# Patient Record
Sex: FEMALE | Race: WHITE | NOT HISPANIC OR LATINO | Employment: UNEMPLOYED | URBAN - METROPOLITAN AREA
[De-identification: names, ages, dates, MRNs, and addresses within clinical notes are randomized per-mention and may not be internally consistent; named-entity substitution may affect disease eponyms.]

---

## 2017-01-12 ENCOUNTER — HISTORICAL (OUTPATIENT)
Dept: LAB | Facility: HOSPITAL | Age: 62
End: 2017-01-12

## 2017-04-24 ENCOUNTER — HISTORICAL (OUTPATIENT)
Dept: LAB | Facility: HOSPITAL | Age: 62
End: 2017-04-24

## 2017-05-26 ENCOUNTER — HISTORICAL (OUTPATIENT)
Dept: LAB | Facility: HOSPITAL | Age: 62
End: 2017-05-26

## 2017-05-26 LAB
ABS NEUT (OLG): 4.5 X10(3)/MCL (ref 1.5–6.9)
BASOPHILS # BLD AUTO: 0.1 X10(3)/MCL (ref 0–0.1)
BASOPHILS NFR BLD AUTO: 1 % (ref 0–1)
EOSINOPHIL # BLD AUTO: 0.2 X10(3)/MCL (ref 0–0.6)
EOSINOPHIL NFR BLD AUTO: 3 % (ref 0–5)
ERYTHROCYTE [DISTWIDTH] IN BLOOD BY AUTOMATED COUNT: 15.7 % (ref 11.5–17)
HCT VFR BLD AUTO: 35.2 % (ref 36–48)
HGB BLD-MCNC: 10.7 GM/DL (ref 12–16)
LYMPHOCYTES # BLD AUTO: 2.1 X10(3)/MCL (ref 0.5–4.1)
LYMPHOCYTES NFR BLD AUTO: 28.3 % (ref 15–40)
MCH RBC QN AUTO: 27 PG (ref 27–34)
MCHC RBC AUTO-ENTMCNC: 30 GM/DL (ref 31–36)
MCV RBC AUTO: 89 FL (ref 80–99)
MONOCYTES # BLD AUTO: 0.5 X10(3)/MCL (ref 0–1.1)
MONOCYTES NFR BLD AUTO: 7 % (ref 4–12)
NEUTROPHILS # BLD AUTO: 4.5 X10(3)/MCL (ref 1.5–6.9)
NEUTROPHILS NFR BLD AUTO: 61 % (ref 43–75)
PLATELET # BLD AUTO: 223 X10(3)/MCL (ref 140–400)
PMV BLD AUTO: 12.1 FL (ref 6.8–10)
RBC # BLD AUTO: 3.95 X10(6)/MCL (ref 4.2–5.4)
WBC # SPEC AUTO: 7.4 X10(3)/MCL (ref 4.5–11.5)

## 2017-05-30 ENCOUNTER — HISTORICAL (OUTPATIENT)
Dept: LAB | Facility: HOSPITAL | Age: 62
End: 2017-05-30

## 2017-05-30 LAB
HAPTOGLOB SERPL-MCNC: 197 MG/DL (ref 31–200)
LDH SERPL-CCNC: 198 UNIT/L (ref 84–241)
RET# (OHS): 0 X10(6)/MCL
RETICULOCYTE COUNT AUTOMATED (OLG): 1.2 %

## 2017-06-12 ENCOUNTER — HISTORICAL (OUTPATIENT)
Dept: ADMINISTRATIVE | Facility: HOSPITAL | Age: 62
End: 2017-06-12

## 2017-08-04 ENCOUNTER — HISTORICAL (OUTPATIENT)
Dept: LAB | Facility: HOSPITAL | Age: 62
End: 2017-08-04

## 2017-08-04 LAB
EST. AVERAGE GLUCOSE BLD GHB EST-MCNC: 111 MG/DL
HBA1C MFR BLD: 5.5 % (ref 4.8–6)

## 2017-08-17 ENCOUNTER — HISTORICAL (OUTPATIENT)
Dept: ADMINISTRATIVE | Facility: HOSPITAL | Age: 62
End: 2017-08-17

## 2017-08-17 LAB
ABS NEUT (OLG): 5 X10(3)/MCL (ref 1.5–6.9)
ALBUMIN SERPL-MCNC: 3.5 GM/DL (ref 3.4–5)
ALBUMIN/GLOB SERPL: 0.8 RATIO
ALP SERPL-CCNC: 106 UNIT/L (ref 30–113)
ALT SERPL-CCNC: 17 UNIT/L (ref 10–45)
ANISOCYTOSIS BLD QL SMEAR: ABNORMAL
AST SERPL-CCNC: 16 UNIT/L (ref 15–37)
BASOPHILS # BLD AUTO: 0.1 X10(3)/MCL (ref 0–0.1)
BASOPHILS NFR BLD AUTO: 1 % (ref 0–1)
BASOPHILS NFR BLD MANUAL: 0 % (ref 0–1)
BILIRUB SERPL-MCNC: 0.6 MG/DL (ref 0.1–0.9)
BILIRUBIN DIRECT+TOT PNL SERPL-MCNC: 0.2 MG/DL (ref 0–0.3)
BILIRUBIN DIRECT+TOT PNL SERPL-MCNC: 0.4 MG/DL
BUN SERPL-MCNC: 46 MG/DL (ref 10–20)
CALCIUM SERPL-MCNC: 9.4 MG/DL (ref 8–10.5)
CHLORIDE SERPL-SCNC: 105 MMOL/L (ref 100–108)
CO2 SERPL-SCNC: 28 MMOL/L (ref 21–35)
CREAT SERPL-MCNC: 1.88 MG/DL (ref 0.7–1.3)
EOSINOPHIL # BLD AUTO: 0.1 X10(3)/MCL (ref 0–0.6)
EOSINOPHIL NFR BLD AUTO: 2 % (ref 0–5)
EOSINOPHIL NFR BLD MANUAL: 3 % (ref 0–5)
ERYTHROCYTE [DISTWIDTH] IN BLOOD BY AUTOMATED COUNT: 17.1 % (ref 11.5–17)
FERRITIN SERPL-MCNC: 65 NG/ML (ref 3–252)
FOLATE SERPL-MCNC: 17.6 NG/ML (ref 8.6–58.9)
GLOBULIN SER-MCNC: 4.2 GM/DL
GLUCOSE SERPL-MCNC: 88 MG/DL (ref 75–116)
GRANULOCYTES NFR BLD MANUAL: 70 % (ref 47–80)
HAPTOGLOB SERPL-MCNC: 231 MG/DL (ref 31–200)
HCT VFR BLD AUTO: 34.8 % (ref 36–48)
HGB BLD-MCNC: 10.9 GM/DL (ref 12–16)
IRON SATN MFR SERPL: 13 % (ref 15–55)
IRON SERPL-MCNC: 49 MCG/DL (ref 50–170)
LDH SERPL-CCNC: 162 UNIT/L (ref 112–240)
LYMPHOCYTES # BLD AUTO: 1.5 X10(3)/MCL (ref 0.5–4.1)
LYMPHOCYTES NFR BLD AUTO: 21.3 % (ref 15–40)
LYMPHOCYTES NFR BLD MANUAL: 21 % (ref 15–40)
MCH RBC QN AUTO: 27 PG (ref 27–34)
MCHC RBC AUTO-ENTMCNC: 31 GM/DL (ref 31–36)
MCV RBC AUTO: 87 FL (ref 80–99)
MONOCYTES # BLD AUTO: 0.4 X10(3)/MCL (ref 0–1.1)
MONOCYTES NFR BLD AUTO: 6 % (ref 4–12)
MONOCYTES NFR BLD MANUAL: 6 % (ref 4–12)
NEUTROPHILS # BLD AUTO: 5 X10(3)/MCL (ref 1.5–6.9)
NEUTROPHILS # BLD AUTO: 5 X10(3)/MCL (ref 1.5–6.9)
NEUTROPHILS NFR BLD AUTO: 69 % (ref 43–75)
PLATELET # BLD AUTO: 253 X10(3)/MCL (ref 140–400)
PLATELET # BLD EST: ADEQUATE 10*3/UL
PMV BLD AUTO: 11.6 FL (ref 6.8–10)
POTASSIUM SERPL-SCNC: 4.6 MMOL/L (ref 3.6–5.2)
PROT SERPL-MCNC: 7.2 GM/DL
PROT SERPL-MCNC: 7.7 GM/DL (ref 6.4–8.2)
RBC # BLD AUTO: 3.99 X10(6)/MCL (ref 4.2–5.4)
RBC MORPH BLD: ABNORMAL
RET# (OHS): 0.04 X10(6)/MCL
RETICULOCYTE COUNT AUTOMATED (OLG): 1.05 % (ref 0.5–1.5)
RHEUMATOID FACT SERPL-ACNC: <10 IU/ML (ref 0–15)
SODIUM SERPL-SCNC: 143 MMOL/L (ref 135–145)
TIBC SERPL-MCNC: 336 MCG/DL (ref 150–375)
TIBC SERPL-MCNC: 385 MCG/DL (ref 250–450)
TSH SERPL-ACNC: 3.02 MIU/ML (ref 0.36–3.74)
VIT B12 SERPL-MCNC: 417 PG/ML (ref 193–986)
WBC # SPEC AUTO: 7.2 X10(3)/MCL (ref 4.5–11.5)

## 2017-10-23 ENCOUNTER — HISTORICAL (OUTPATIENT)
Dept: LAB | Facility: HOSPITAL | Age: 62
End: 2017-10-23

## 2017-10-23 LAB
ABS NEUT (OLG): 3.1 X10(3)/MCL (ref 1.5–6.9)
BASOPHILS # BLD AUTO: 0.1 X10(3)/MCL (ref 0–0.1)
BASOPHILS NFR BLD AUTO: 1 % (ref 0–1)
EOSINOPHIL # BLD AUTO: 0.1 X10(3)/MCL (ref 0–0.6)
EOSINOPHIL NFR BLD AUTO: 2 % (ref 0–5)
ERYTHROCYTE [DISTWIDTH] IN BLOOD BY AUTOMATED COUNT: 15.9 % (ref 11.5–17)
FERRITIN SERPL-MCNC: 41 NG/ML (ref 3–252)
HCT VFR BLD AUTO: 33.5 % (ref 36–48)
HGB BLD-MCNC: 10.1 GM/DL (ref 12–16)
IRON SATN MFR SERPL: 12 % (ref 15–55)
IRON SERPL-MCNC: 41 MCG/DL (ref 50–170)
LYMPHOCYTES # BLD AUTO: 1 X10(3)/MCL (ref 0.5–4.1)
LYMPHOCYTES NFR BLD AUTO: 22 % (ref 15–40)
MCH RBC QN AUTO: 27 PG (ref 27–34)
MCHC RBC AUTO-ENTMCNC: 30 GM/DL (ref 31–36)
MCV RBC AUTO: 91 FL (ref 80–99)
MONOCYTES # BLD AUTO: 0.4 X10(3)/MCL (ref 0–1.1)
MONOCYTES NFR BLD AUTO: 10 % (ref 4–12)
NEUTROPHILS # BLD AUTO: 3.1 X10(3)/MCL (ref 1.5–6.9)
NEUTROPHILS NFR BLD AUTO: 65 % (ref 43–75)
PLATELET # BLD AUTO: 189 X10(3)/MCL (ref 140–400)
PMV BLD AUTO: 12.1 FL (ref 6.8–10)
RBC # BLD AUTO: 3.69 X10(6)/MCL (ref 4.2–5.4)
TIBC SERPL-MCNC: 294 MCG/DL (ref 150–375)
TIBC SERPL-MCNC: 335 MCG/DL (ref 250–450)
WBC # SPEC AUTO: 4.7 X10(3)/MCL (ref 4.5–11.5)

## 2017-12-27 ENCOUNTER — HISTORICAL (OUTPATIENT)
Dept: INFUSION THERAPY | Facility: HOSPITAL | Age: 62
End: 2017-12-27

## 2017-12-27 LAB
ABS NEUT (OLG): 4.1 X10(3)/MCL (ref 1.5–6.9)
BASOPHILS # BLD AUTO: 0.1 X10(3)/MCL (ref 0–0.1)
BASOPHILS NFR BLD AUTO: 1 % (ref 0–1)
EOSINOPHIL # BLD AUTO: 0.1 X10(3)/MCL (ref 0–0.6)
EOSINOPHIL NFR BLD AUTO: 2 % (ref 0–5)
ERYTHROCYTE [DISTWIDTH] IN BLOOD BY AUTOMATED COUNT: 15.5 % (ref 11.5–17)
FERRITIN SERPL-MCNC: 72 NG/ML (ref 3–252)
HCT VFR BLD AUTO: 34.9 % (ref 36–48)
HGB BLD-MCNC: 10.7 GM/DL (ref 12–16)
IRON SATN MFR SERPL: 21 % (ref 15–55)
IRON SERPL-MCNC: 65 MCG/DL (ref 50–170)
LYMPHOCYTES # BLD AUTO: 1.5 X10(3)/MCL (ref 0.5–4.1)
LYMPHOCYTES NFR BLD AUTO: 24.2 % (ref 15–40)
MCH RBC QN AUTO: 28 PG (ref 27–34)
MCHC RBC AUTO-ENTMCNC: 31 GM/DL (ref 31–36)
MCV RBC AUTO: 93 FL (ref 80–99)
MONOCYTES # BLD AUTO: 0.4 X10(3)/MCL (ref 0–1.1)
MONOCYTES NFR BLD AUTO: 6 % (ref 4–12)
NEUTROPHILS # BLD AUTO: 4.1 X10(3)/MCL (ref 1.5–6.9)
NEUTROPHILS NFR BLD AUTO: 66 % (ref 43–75)
PLATELET # BLD AUTO: 183 X10(3)/MCL (ref 140–400)
PMV BLD AUTO: 11.9 FL (ref 6.8–10)
RBC # BLD AUTO: 3.75 X10(6)/MCL (ref 4.2–5.4)
TIBC SERPL-MCNC: 246 MCG/DL (ref 150–375)
TIBC SERPL-MCNC: 311 MCG/DL (ref 250–450)
WBC # SPEC AUTO: 6.2 X10(3)/MCL (ref 4.5–11.5)

## 2018-06-27 ENCOUNTER — HISTORICAL (OUTPATIENT)
Dept: LAB | Facility: HOSPITAL | Age: 63
End: 2018-06-27

## 2018-06-27 LAB
ABS NEUT (OLG): 3.5 X10(3)/MCL (ref 1.5–6.9)
ALBUMIN SERPL-MCNC: 2.7 GM/DL (ref 3.4–5)
ALBUMIN/GLOB SERPL: 0.7 RATIO
ALP SERPL-CCNC: 122 UNIT/L (ref 30–113)
ALT SERPL-CCNC: 12 UNIT/L (ref 10–45)
AST SERPL-CCNC: 14 UNIT/L (ref 15–37)
BASOPHILS # BLD AUTO: 0 X10(3)/MCL (ref 0–0.1)
BASOPHILS NFR BLD AUTO: 1 % (ref 0–1)
BILIRUB SERPL-MCNC: 0.5 MG/DL (ref 0.1–0.9)
BILIRUBIN DIRECT+TOT PNL SERPL-MCNC: 0.1 MG/DL (ref 0–0.3)
BILIRUBIN DIRECT+TOT PNL SERPL-MCNC: 0.4 MG/DL
BUN SERPL-MCNC: 22 MG/DL (ref 10–20)
CALCIUM SERPL-MCNC: 8.6 MG/DL (ref 8–10.5)
CHLORIDE SERPL-SCNC: 106 MMOL/L (ref 100–108)
CO2 SERPL-SCNC: 29 MMOL/L (ref 21–35)
CREAT SERPL-MCNC: 1.07 MG/DL (ref 0.7–1.3)
EOSINOPHIL # BLD AUTO: 0.2 X10(3)/MCL (ref 0–0.6)
EOSINOPHIL NFR BLD AUTO: 3 % (ref 0–5)
ERYTHROCYTE [DISTWIDTH] IN BLOOD BY AUTOMATED COUNT: 16 % (ref 11.5–17)
FERRITIN SERPL-MCNC: 87 NG/ML (ref 3–252)
GLOBULIN SER-MCNC: 3.8 GM/DL
GLUCOSE SERPL-MCNC: 95 MG/DL (ref 75–116)
HCT VFR BLD AUTO: 34.7 % (ref 36–48)
HGB BLD-MCNC: 10.4 GM/DL (ref 12–16)
IRON SATN MFR SERPL: 15 % (ref 15–55)
IRON SERPL-MCNC: 37 MCG/DL (ref 50–170)
LYMPHOCYTES # BLD AUTO: 1.6 X10(3)/MCL (ref 0.5–4.1)
LYMPHOCYTES NFR BLD AUTO: 28.9 % (ref 15–40)
MCH RBC QN AUTO: 27 PG (ref 27–34)
MCHC RBC AUTO-ENTMCNC: 30 GM/DL (ref 31–36)
MCV RBC AUTO: 90 FL (ref 80–99)
MONOCYTES # BLD AUTO: 0.4 X10(3)/MCL (ref 0–1.1)
MONOCYTES NFR BLD AUTO: 6 % (ref 4–12)
NEUTROPHILS # BLD AUTO: 3.5 X10(3)/MCL (ref 1.5–6.9)
NEUTROPHILS NFR BLD AUTO: 61 % (ref 43–75)
PLATELET # BLD AUTO: 163 X10(3)/MCL (ref 140–400)
PMV BLD AUTO: 12.8 FL (ref 6.8–10)
POTASSIUM SERPL-SCNC: 3.6 MMOL/L (ref 3.6–5.2)
PROT SERPL-MCNC: 6.5 GM/DL (ref 6.4–8.2)
RBC # BLD AUTO: 3.87 X10(6)/MCL (ref 4.2–5.4)
SODIUM SERPL-SCNC: 142 MMOL/L (ref 135–145)
TIBC SERPL-MCNC: 209 MCG/DL (ref 150–375)
TIBC SERPL-MCNC: 246 MCG/DL (ref 250–450)
WBC # SPEC AUTO: 5.7 X10(3)/MCL (ref 4.5–11.5)

## 2018-07-13 ENCOUNTER — HISTORICAL (OUTPATIENT)
Dept: LAB | Facility: HOSPITAL | Age: 63
End: 2018-07-13

## 2018-07-13 LAB
ABS NEUT (OLG): 4.1 X10(3)/MCL (ref 1.5–6.9)
ALBUMIN SERPL-MCNC: 3.1 GM/DL (ref 3.4–5)
ALBUMIN/GLOB SERPL: 0.9 RATIO
ALP SERPL-CCNC: 140 UNIT/L (ref 30–113)
ALT SERPL-CCNC: 13 UNIT/L (ref 10–45)
AST SERPL-CCNC: 15 UNIT/L (ref 15–37)
BASOPHILS # BLD AUTO: 0.1 X10(3)/MCL (ref 0–0.1)
BASOPHILS NFR BLD AUTO: 1 % (ref 0–1)
BILIRUB SERPL-MCNC: 0.6 MG/DL (ref 0.1–0.9)
BILIRUBIN DIRECT+TOT PNL SERPL-MCNC: 0.2 MG/DL (ref 0–0.3)
BILIRUBIN DIRECT+TOT PNL SERPL-MCNC: 0.4 MG/DL
BUN SERPL-MCNC: 22 MG/DL (ref 10–20)
CALCIUM SERPL-MCNC: 9.1 MG/DL (ref 8–10.5)
CHLORIDE SERPL-SCNC: 106 MMOL/L (ref 100–108)
CHOLEST SERPL-MCNC: 198 MG/DL (ref 140–200)
CHOLEST/HDLC SERPL: 5 MG/DL (ref 0–8)
CO2 SERPL-SCNC: 29 MMOL/L (ref 21–35)
CREAT SERPL-MCNC: 1.1 MG/DL (ref 0.7–1.3)
EOSINOPHIL # BLD AUTO: 0.1 X10(3)/MCL (ref 0–0.6)
EOSINOPHIL NFR BLD AUTO: 2 % (ref 0–5)
ERYTHROCYTE [DISTWIDTH] IN BLOOD BY AUTOMATED COUNT: 16.2 % (ref 11.5–17)
EST. AVERAGE GLUCOSE BLD GHB EST-MCNC: 123 MG/DL
GLOBULIN SER-MCNC: 3.6 GM/DL
GLUCOSE SERPL-MCNC: 97 MG/DL (ref 75–116)
HBA1C MFR BLD: 5.9 % (ref 4.8–6)
HCT VFR BLD AUTO: 35.7 % (ref 36–48)
HDLC SERPL-MCNC: 40 MG/DL (ref 35–59)
HGB BLD-MCNC: 10.7 GM/DL (ref 12–16)
LDLC SERPL CALC-MCNC: 132 MG/DL (ref 100–129)
LYMPHOCYTES # BLD AUTO: 1.4 X10(3)/MCL (ref 0.5–4.1)
LYMPHOCYTES NFR BLD AUTO: 22.4 % (ref 15–40)
MCH RBC QN AUTO: 27 PG (ref 27–34)
MCHC RBC AUTO-ENTMCNC: 30 GM/DL (ref 31–36)
MCV RBC AUTO: 91 FL (ref 80–99)
MONOCYTES # BLD AUTO: 0.4 X10(3)/MCL (ref 0–1.1)
MONOCYTES NFR BLD AUTO: 7 % (ref 4–12)
NEUTROPHILS # BLD AUTO: 4.1 X10(3)/MCL (ref 1.5–6.9)
NEUTROPHILS NFR BLD AUTO: 67 % (ref 43–75)
PLATELET # BLD AUTO: 188 X10(3)/MCL (ref 140–400)
PMV BLD AUTO: 12.9 FL (ref 6.8–10)
POTASSIUM SERPL-SCNC: 4.3 MMOL/L (ref 3.6–5.2)
PROT SERPL-MCNC: 6.7 GM/DL (ref 6.4–8.2)
RBC # BLD AUTO: 3.94 X10(6)/MCL (ref 4.2–5.4)
SODIUM SERPL-SCNC: 145 MMOL/L (ref 135–145)
TRIGL SERPL-MCNC: 181 MG/DL (ref 35–150)
URATE SERPL-MCNC: 6.6 MG/DL (ref 2.6–7.2)
VLDLC SERPL CALC-MCNC: 36 MG/DL
WBC # SPEC AUTO: 6.1 X10(3)/MCL (ref 4.5–11.5)

## 2018-10-15 ENCOUNTER — HISTORICAL (OUTPATIENT)
Dept: LAB | Facility: HOSPITAL | Age: 63
End: 2018-10-15

## 2018-10-15 LAB
ABS NEUT (OLG): 4.6 X10(3)/MCL (ref 1.5–6.9)
ALBUMIN SERPL-MCNC: 3.1 GM/DL (ref 3.4–5)
ALBUMIN/GLOB SERPL: 0.8 RATIO
ALP SERPL-CCNC: 128 UNIT/L (ref 30–113)
ALT SERPL-CCNC: 16 UNIT/L (ref 10–45)
AST SERPL-CCNC: 15 UNIT/L (ref 15–37)
BASOPHILS # BLD AUTO: 0.1 X10(3)/MCL (ref 0–0.1)
BASOPHILS NFR BLD AUTO: 1 % (ref 0–1)
BILIRUB SERPL-MCNC: 0.4 MG/DL (ref 0.1–0.9)
BILIRUBIN DIRECT+TOT PNL SERPL-MCNC: 0.1 MG/DL (ref 0–0.3)
BILIRUBIN DIRECT+TOT PNL SERPL-MCNC: 0.3 MG/DL
BUN SERPL-MCNC: 34 MG/DL (ref 10–20)
CALCIUM SERPL-MCNC: 8.9 MG/DL (ref 8–10.5)
CHLORIDE SERPL-SCNC: 105 MMOL/L (ref 100–108)
CO2 SERPL-SCNC: 30 MMOL/L (ref 21–35)
CREAT SERPL-MCNC: 1.22 MG/DL (ref 0.7–1.3)
EOSINOPHIL # BLD AUTO: 0.2 X10(3)/MCL (ref 0–0.6)
EOSINOPHIL NFR BLD AUTO: 3 % (ref 0–5)
ERYTHROCYTE [DISTWIDTH] IN BLOOD BY AUTOMATED COUNT: 15.9 % (ref 11.5–17)
FERRITIN SERPL-MCNC: 99 NG/ML (ref 3–252)
GLOBULIN SER-MCNC: 4 GM/DL
GLUCOSE SERPL-MCNC: 127 MG/DL (ref 75–116)
HCT VFR BLD AUTO: 31.9 % (ref 36–48)
HGB BLD-MCNC: 9.6 GM/DL (ref 12–16)
IMM GRANULOCYTES # BLD AUTO: 0.02 10*3/UL (ref 0–0.02)
IMM GRANULOCYTES NFR BLD AUTO: 0.3 % (ref 0–0.43)
IRON SATN MFR SERPL: 12 % (ref 15–55)
IRON SERPL-MCNC: 36 MCG/DL (ref 50–170)
LYMPHOCYTES # BLD AUTO: 1.2 X10(3)/MCL (ref 0.5–4.1)
LYMPHOCYTES NFR BLD AUTO: 19 % (ref 15–40)
MCH RBC QN AUTO: 28 PG (ref 27–34)
MCHC RBC AUTO-ENTMCNC: 30 GM/DL (ref 31–36)
MCV RBC AUTO: 94 FL (ref 80–99)
MONOCYTES # BLD AUTO: 0.3 X10(3)/MCL (ref 0–1.1)
MONOCYTES NFR BLD AUTO: 5 % (ref 4–12)
NEUTROPHILS # BLD AUTO: 4.6 X10(3)/MCL (ref 1.5–6.9)
NEUTROPHILS NFR BLD AUTO: 71 % (ref 43–75)
PLATELET # BLD AUTO: 187 X10(3)/MCL (ref 140–400)
PMV BLD AUTO: 12.3 FL (ref 6.8–10)
POTASSIUM SERPL-SCNC: 5 MMOL/L (ref 3.6–5.2)
PROT SERPL-MCNC: 7.1 GM/DL (ref 6.4–8.2)
RBC # BLD AUTO: 3.38 X10(6)/MCL (ref 4.2–5.4)
RET# (OHS): 0.06 X10(6)/MCL (ref 0.02–0.08)
RETICULOCYTE COUNT AUTOMATED (OLG): 1.78 % (ref 0.5–1.5)
SODIUM SERPL-SCNC: 142 MMOL/L (ref 135–145)
TIBC SERPL-MCNC: 264 MCG/DL (ref 150–375)
TIBC SERPL-MCNC: 300 MCG/DL (ref 250–450)
WBC # SPEC AUTO: 6.4 X10(3)/MCL (ref 4.5–11.5)

## 2018-11-05 ENCOUNTER — HISTORICAL (OUTPATIENT)
Dept: LAB | Facility: HOSPITAL | Age: 63
End: 2018-11-05

## 2018-11-05 LAB
ABS NEUT (OLG): 5.7 X10(3)/MCL (ref 1.5–6.9)
ALBUMIN SERPL-MCNC: 3.3 GM/DL (ref 3.4–5)
ALBUMIN/GLOB SERPL: 0.8 RATIO
ALP SERPL-CCNC: 133 UNIT/L (ref 30–113)
ALT SERPL-CCNC: 13 UNIT/L (ref 10–45)
AST SERPL-CCNC: 12 UNIT/L (ref 15–37)
BASOPHILS # BLD AUTO: 0.1 X10(3)/MCL (ref 0–0.1)
BASOPHILS NFR BLD AUTO: 1 % (ref 0–1)
BILIRUB SERPL-MCNC: 0.3 MG/DL (ref 0.1–0.9)
BILIRUBIN DIRECT+TOT PNL SERPL-MCNC: 0.1 MG/DL (ref 0–0.3)
BILIRUBIN DIRECT+TOT PNL SERPL-MCNC: 0.2 MG/DL
BUN SERPL-MCNC: 39 MG/DL (ref 10–20)
CALCIUM SERPL-MCNC: 9.3 MG/DL (ref 8–10.5)
CHLORIDE SERPL-SCNC: 103 MMOL/L (ref 100–108)
CO2 SERPL-SCNC: 28 MMOL/L (ref 21–35)
CREAT SERPL-MCNC: 1.44 MG/DL (ref 0.7–1.3)
EOSINOPHIL # BLD AUTO: 0.4 X10(3)/MCL (ref 0–0.6)
EOSINOPHIL NFR BLD AUTO: 5 % (ref 0–5)
ERYTHROCYTE [DISTWIDTH] IN BLOOD BY AUTOMATED COUNT: 14.9 % (ref 11.5–17)
EST. AVERAGE GLUCOSE BLD GHB EST-MCNC: 140 MG/DL
GLOBULIN SER-MCNC: 4 GM/DL
GLUCOSE SERPL-MCNC: 112 MG/DL (ref 75–116)
HBA1C MFR BLD: 6.5 % (ref 4.8–6)
HCT VFR BLD AUTO: 32.5 % (ref 36–48)
HGB BLD-MCNC: 9.6 GM/DL (ref 12–16)
IMM GRANULOCYTES # BLD AUTO: 0.03 10*3/UL (ref 0–0.02)
IMM GRANULOCYTES NFR BLD AUTO: 0.4 % (ref 0–0.43)
LYMPHOCYTES # BLD AUTO: 1.6 X10(3)/MCL (ref 0.5–4.1)
LYMPHOCYTES NFR BLD AUTO: 20 % (ref 15–40)
MCH RBC QN AUTO: 27 PG (ref 27–34)
MCHC RBC AUTO-ENTMCNC: 30 GM/DL (ref 31–36)
MCV RBC AUTO: 93 FL (ref 80–99)
MONOCYTES # BLD AUTO: 0.5 X10(3)/MCL (ref 0–1.1)
MONOCYTES NFR BLD AUTO: 6 % (ref 4–12)
NEUTROPHILS # BLD AUTO: 5.7 X10(3)/MCL (ref 1.5–6.9)
NEUTROPHILS NFR BLD AUTO: 68 % (ref 43–75)
PLATELET # BLD AUTO: 232 X10(3)/MCL (ref 140–400)
PMV BLD AUTO: 11.2 FL (ref 6.8–10)
POTASSIUM SERPL-SCNC: 5.5 MMOL/L (ref 3.6–5.2)
PROT SERPL-MCNC: 7.3 GM/DL (ref 6.4–8.2)
RBC # BLD AUTO: 3.5 X10(6)/MCL (ref 4.2–5.4)
SODIUM SERPL-SCNC: 140 MMOL/L (ref 135–145)
T4 FREE SERPL-MCNC: 1.01 NG/DL (ref 0.76–1.46)
TSH SERPL-ACNC: 3.83 MIU/ML (ref 0.36–3.74)
WBC # SPEC AUTO: 8.4 X10(3)/MCL (ref 4.5–11.5)

## 2018-11-21 ENCOUNTER — HISTORICAL (OUTPATIENT)
Dept: LAB | Facility: HOSPITAL | Age: 63
End: 2018-11-21

## 2018-11-21 LAB
ABS NEUT (OLG): 3.8 X10(3)/MCL (ref 1.5–6.9)
BASOPHILS # BLD AUTO: 0.1 X10(3)/MCL (ref 0–0.1)
BASOPHILS NFR BLD AUTO: 1 % (ref 0–1)
EOSINOPHIL # BLD AUTO: 0.2 X10(3)/MCL (ref 0–0.6)
EOSINOPHIL NFR BLD AUTO: 3 % (ref 0–5)
ERYTHROCYTE [DISTWIDTH] IN BLOOD BY AUTOMATED COUNT: 15.3 % (ref 11.5–17)
FERRITIN SERPL-MCNC: 97 NG/ML (ref 3–252)
FOLATE SERPL-MCNC: >20 NG/ML (ref 8.6–58.9)
HCT VFR BLD AUTO: 31.4 % (ref 36–48)
HGB BLD-MCNC: 9.2 GM/DL (ref 12–16)
IMM GRANULOCYTES # BLD AUTO: 0.01 10*3/UL (ref 0–0.02)
IMM GRANULOCYTES NFR BLD AUTO: 0.2 % (ref 0–0.43)
IRON SATN MFR SERPL: 9 % (ref 15–55)
IRON SERPL-MCNC: 28 MCG/DL (ref 50–170)
LYMPHOCYTES # BLD AUTO: 1.3 X10(3)/MCL (ref 0.5–4.1)
LYMPHOCYTES NFR BLD AUTO: 23 % (ref 15–40)
MCH RBC QN AUTO: 28 PG (ref 27–34)
MCHC RBC AUTO-ENTMCNC: 29 GM/DL (ref 31–36)
MCV RBC AUTO: 94 FL (ref 80–99)
MONOCYTES # BLD AUTO: 0.4 X10(3)/MCL (ref 0–1.1)
MONOCYTES NFR BLD AUTO: 7 % (ref 4–12)
NEUTROPHILS # BLD AUTO: 3.8 X10(3)/MCL (ref 1.5–6.9)
NEUTROPHILS NFR BLD AUTO: 65 % (ref 43–75)
PLATELET # BLD AUTO: 199 X10(3)/MCL (ref 140–400)
PMV BLD AUTO: 12.4 FL (ref 6.8–10)
RBC # BLD AUTO: 3.34 X10(6)/MCL (ref 4.2–5.4)
RET# (OHS): 0.05 X10(6)/MCL (ref 0.02–0.08)
RETICULOCYTE COUNT AUTOMATED (OLG): 1.55 % (ref 0.5–1.5)
TIBC SERPL-MCNC: 268 MCG/DL (ref 150–375)
TIBC SERPL-MCNC: 296 MCG/DL (ref 250–450)
TRANSFERRIN SERPL-MCNC: 228 MG/DL (ref 200–360)
VIT B12 SERPL-MCNC: 516 PG/ML (ref 193–986)
WBC # SPEC AUTO: 5.8 X10(3)/MCL (ref 4.5–11.5)

## 2019-03-13 ENCOUNTER — HISTORICAL (OUTPATIENT)
Dept: LAB | Facility: HOSPITAL | Age: 64
End: 2019-03-13

## 2019-03-13 LAB
ABS NEUT (OLG): 3.8 X10(3)/MCL (ref 1.5–6.9)
ALBUMIN SERPL-MCNC: 3.2 GM/DL (ref 3.4–5)
ALBUMIN/GLOB SERPL: 0.8 RATIO
ALP SERPL-CCNC: 116 UNIT/L (ref 30–113)
ALT SERPL-CCNC: 17 UNIT/L (ref 10–45)
ANISOCYTOSIS BLD QL SMEAR: ABNORMAL
AST SERPL-CCNC: 14 UNIT/L (ref 15–37)
BASOPHILS # BLD AUTO: 0.1 X10(3)/MCL (ref 0–0.1)
BASOPHILS NFR BLD AUTO: 1 % (ref 0–1)
BILIRUB SERPL-MCNC: 0.4 MG/DL (ref 0.1–0.9)
BILIRUBIN DIRECT+TOT PNL SERPL-MCNC: <0.05 MG/DL (ref 0–0.3)
BILIRUBIN DIRECT+TOT PNL SERPL-MCNC: >0.35 MG/DL
BUN SERPL-MCNC: 54 MG/DL (ref 10–20)
CALCIUM SERPL-MCNC: 9.2 MG/DL (ref 8–10.5)
CHLORIDE SERPL-SCNC: 104 MMOL/L (ref 100–108)
CO2 SERPL-SCNC: 25 MMOL/L (ref 21–35)
CREAT SERPL-MCNC: 1.58 MG/DL (ref 0.7–1.3)
CREAT UR-MCNC: 76.9 MG/DL
EOSINOPHIL # BLD AUTO: 0.2 X10(3)/MCL (ref 0–0.6)
EOSINOPHIL NFR BLD AUTO: 2 % (ref 0–5)
ERYTHROCYTE [DISTWIDTH] IN BLOOD BY AUTOMATED COUNT: 15.1 % (ref 11.5–17)
EST. AVERAGE GLUCOSE BLD GHB EST-MCNC: 131 MG/DL
GLOBULIN SER-MCNC: 4 GM/DL
GLUCOSE SERPL-MCNC: 98 MG/DL (ref 75–116)
HBA1C MFR BLD: 6.2 % (ref 4.8–6)
HCT VFR BLD AUTO: 31.5 % (ref 36–48)
HGB BLD-MCNC: 9.3 GM/DL (ref 12–16)
HYPOCHROMIA BLD QL SMEAR: ABNORMAL
IMM GRANULOCYTES # BLD AUTO: 0.02 10*3/UL (ref 0–0.02)
IMM GRANULOCYTES NFR BLD AUTO: 0.3 % (ref 0–0.43)
LYMPHOCYTES # BLD AUTO: 1.8 X10(3)/MCL (ref 0.5–4.1)
LYMPHOCYTES NFR BLD AUTO: 28 % (ref 15–40)
MCH RBC QN AUTO: 28 PG (ref 27–34)
MCHC RBC AUTO-ENTMCNC: 30 GM/DL (ref 31–36)
MCV RBC AUTO: 94 FL (ref 80–99)
MICROALBUMIN UR-MCNC: 4.4 MG/DL (ref 0.1–2)
MICROALBUMIN/CREAT RATIO PNL UR: 57.2 MG/GM CR (ref 0–30)
MICROCYTES BLD QL SMEAR: ABNORMAL
MONOCYTES # BLD AUTO: 0.4 X10(3)/MCL (ref 0–1.1)
MONOCYTES NFR BLD AUTO: 7 % (ref 4–12)
NEUTROPHILS # BLD AUTO: 3.8 X10(3)/MCL (ref 1.5–6.9)
NEUTROPHILS NFR BLD AUTO: 61 % (ref 43–75)
PLATELET # BLD AUTO: 225 X10(3)/MCL (ref 140–400)
PLATELET # BLD EST: ADEQUATE 10*3/UL
PMV BLD AUTO: 11.6 FL (ref 6.8–10)
POTASSIUM SERPL-SCNC: 4.9 MMOL/L (ref 3.6–5.2)
PROT SERPL-MCNC: 7.2 GM/DL (ref 6.4–8.2)
RBC # BLD AUTO: 3.35 X10(6)/MCL (ref 4.2–5.4)
RBC MORPH BLD: ABNORMAL
SODIUM SERPL-SCNC: 140 MMOL/L (ref 135–145)
WBC # SPEC AUTO: 6.2 X10(3)/MCL (ref 4.5–11.5)

## 2019-04-24 ENCOUNTER — HISTORICAL (OUTPATIENT)
Dept: LAB | Facility: HOSPITAL | Age: 64
End: 2019-04-24

## 2019-04-24 LAB
ABS NEUT (OLG): 5.4 X10(3)/MCL (ref 1.5–6.9)
ALBUMIN SERPL-MCNC: 3.1 GM/DL (ref 3.4–5)
ALBUMIN/GLOB SERPL: 0.8 RATIO
ALP SERPL-CCNC: 107 UNIT/L (ref 30–113)
ALT SERPL-CCNC: 16 UNIT/L (ref 10–45)
AST SERPL-CCNC: 17 UNIT/L (ref 15–37)
BASOPHILS # BLD AUTO: 0.1 X10(3)/MCL (ref 0–0.1)
BASOPHILS NFR BLD AUTO: 1 % (ref 0–1)
BILIRUB SERPL-MCNC: 0.4 MG/DL (ref 0.1–0.9)
BILIRUBIN DIRECT+TOT PNL SERPL-MCNC: 0.1 MG/DL (ref 0–0.3)
BILIRUBIN DIRECT+TOT PNL SERPL-MCNC: 0.3 MG/DL
BUN SERPL-MCNC: 58 MG/DL (ref 10–20)
CALCIUM SERPL-MCNC: 9.4 MG/DL (ref 8–10.5)
CHLORIDE SERPL-SCNC: 102 MMOL/L (ref 100–108)
CHOLEST SERPL-MCNC: 173 MG/DL (ref 140–200)
CHOLEST/HDLC SERPL: 5 MG/DL (ref 0–8)
CO2 SERPL-SCNC: 28 MMOL/L (ref 21–35)
CREAT SERPL-MCNC: 1.83 MG/DL (ref 0.7–1.3)
EOSINOPHIL # BLD AUTO: 0.2 X10(3)/MCL (ref 0–0.6)
EOSINOPHIL NFR BLD AUTO: 2 % (ref 0–5)
ERYTHROCYTE [DISTWIDTH] IN BLOOD BY AUTOMATED COUNT: 15.5 % (ref 11.5–17)
EST. AVERAGE GLUCOSE BLD GHB EST-MCNC: 140 MG/DL
GLOBULIN SER-MCNC: 3.9 GM/DL
GLUCOSE SERPL-MCNC: 111 MG/DL (ref 75–116)
HBA1C MFR BLD: 6.5 % (ref 4.8–6)
HCT VFR BLD AUTO: 30.8 % (ref 36–48)
HDLC SERPL-MCNC: 37 MG/DL (ref 35–59)
HGB BLD-MCNC: 9.1 GM/DL (ref 12–16)
IMM GRANULOCYTES # BLD AUTO: 0.03 10*3/UL (ref 0–0.02)
IMM GRANULOCYTES NFR BLD AUTO: 0.4 % (ref 0–0.43)
LDLC SERPL CALC-MCNC: 103 MG/DL (ref 100–129)
LYMPHOCYTES # BLD AUTO: 1.9 X10(3)/MCL (ref 0.5–4.1)
LYMPHOCYTES NFR BLD AUTO: 23 % (ref 15–40)
MCH RBC QN AUTO: 28 PG (ref 27–34)
MCHC RBC AUTO-ENTMCNC: 30 GM/DL (ref 31–36)
MCV RBC AUTO: 94 FL (ref 80–99)
MONOCYTES # BLD AUTO: 0.6 X10(3)/MCL (ref 0–1.1)
MONOCYTES NFR BLD AUTO: 7 % (ref 4–12)
NEUTROPHILS # BLD AUTO: 5.4 X10(3)/MCL (ref 1.5–6.9)
NEUTROPHILS NFR BLD AUTO: 67 % (ref 43–75)
PLATELET # BLD AUTO: 265 X10(3)/MCL (ref 140–400)
PMV BLD AUTO: 11.4 FL (ref 6.8–10)
POTASSIUM SERPL-SCNC: 5.1 MMOL/L (ref 3.6–5.2)
PROT SERPL-MCNC: 7 GM/DL (ref 6.4–8.2)
RBC # BLD AUTO: 3.27 X10(6)/MCL (ref 4.2–5.4)
SODIUM SERPL-SCNC: 140 MMOL/L (ref 135–145)
T4 FREE SERPL-MCNC: 0.89 NG/DL (ref 0.76–1.46)
TRIGL SERPL-MCNC: 190 MG/DL (ref 35–150)
TSH SERPL-ACNC: 3.11 MIU/ML (ref 0.36–3.74)
VLDLC SERPL CALC-MCNC: 38 MG/DL
WBC # SPEC AUTO: 8.1 X10(3)/MCL (ref 4.5–11.5)

## 2019-06-28 ENCOUNTER — HISTORICAL (OUTPATIENT)
Dept: LAB | Facility: HOSPITAL | Age: 64
End: 2019-06-28

## 2019-06-28 LAB
ABS NEUT (OLG): 4.1 X10(3)/MCL (ref 1.5–6.9)
ALBUMIN SERPL-MCNC: 2.9 GM/DL (ref 3.4–5)
ALBUMIN/GLOB SERPL: 0.8 RATIO
ALP SERPL-CCNC: 101 UNIT/L (ref 30–113)
ALT SERPL-CCNC: 12 UNIT/L (ref 10–45)
ANISOCYTOSIS BLD QL SMEAR: ABNORMAL
AST SERPL-CCNC: 11 UNIT/L (ref 15–37)
BASOPHILS # BLD AUTO: 0.1 X10(3)/MCL (ref 0–0.1)
BASOPHILS NFR BLD AUTO: 1 % (ref 0–1)
BILIRUB SERPL-MCNC: 0.3 MG/DL (ref 0.1–0.9)
BILIRUBIN DIRECT+TOT PNL SERPL-MCNC: 0.1 MG/DL (ref 0–0.3)
BILIRUBIN DIRECT+TOT PNL SERPL-MCNC: 0.2 MG/DL
BUN SERPL-MCNC: 33 MG/DL (ref 10–20)
CALCIUM SERPL-MCNC: 9.1 MG/DL (ref 8–10.5)
CHLORIDE SERPL-SCNC: 108 MMOL/L (ref 100–108)
CO2 SERPL-SCNC: 26 MMOL/L (ref 21–35)
CREAT SERPL-MCNC: 1.3 MG/DL (ref 0.7–1.3)
EOSINOPHIL # BLD AUTO: 0.2 X10(3)/MCL (ref 0–0.6)
EOSINOPHIL NFR BLD AUTO: 4 % (ref 0–5)
ERYTHROCYTE [DISTWIDTH] IN BLOOD BY AUTOMATED COUNT: 15.9 % (ref 11.5–17)
FERRITIN SERPL-MCNC: 51 NG/ML (ref 3–252)
FOLATE SERPL-MCNC: 11.9 NG/ML (ref 8.6–58.9)
GLOBULIN SER-MCNC: 3.8 GM/DL
GLUCOSE SERPL-MCNC: 97 MG/DL (ref 75–116)
HCT VFR BLD AUTO: 29.5 % (ref 36–48)
HEMOCCULT SP1 STL QL: NEGATIVE
HGB BLD-MCNC: 8.5 GM/DL (ref 12–16)
HYPOCHROMIA BLD QL SMEAR: ABNORMAL
IMM GRANULOCYTES # BLD AUTO: 0.02 10*3/UL (ref 0–0.02)
IMM GRANULOCYTES NFR BLD AUTO: 0.3 % (ref 0–0.43)
LYMPHOCYTES # BLD AUTO: 1.5 X10(3)/MCL (ref 0.5–4.1)
LYMPHOCYTES NFR BLD AUTO: 24 % (ref 15–40)
MCH RBC QN AUTO: 28 PG (ref 27–34)
MCHC RBC AUTO-ENTMCNC: 29 GM/DL (ref 31–36)
MCV RBC AUTO: 96 FL (ref 80–99)
MONOCYTES # BLD AUTO: 0.4 X10(3)/MCL (ref 0–1.1)
MONOCYTES NFR BLD AUTO: 7 % (ref 4–12)
NEUTROPHILS # BLD AUTO: 4.1 X10(3)/MCL (ref 1.5–6.9)
NEUTROPHILS NFR BLD AUTO: 64 % (ref 43–75)
PLATELET # BLD AUTO: 203 X10(3)/MCL (ref 140–400)
PLATELET # BLD EST: ADEQUATE 10*3/UL
PMV BLD AUTO: 11.6 FL (ref 6.8–10)
POTASSIUM SERPL-SCNC: 5 MMOL/L (ref 3.6–5.2)
PROT SERPL-MCNC: 6.3 GM/DL
PROT SERPL-MCNC: 6.7 GM/DL (ref 6.4–8.2)
RBC # BLD AUTO: 3.06 X10(6)/MCL (ref 4.2–5.4)
RBC MORPH BLD: ABNORMAL
RET# (OHS): 0.04 X10(6)/MCL (ref 0.02–0.08)
RETICULOCYTE COUNT AUTOMATED (OLG): 1.44 % (ref 0.5–1.5)
SODIUM SERPL-SCNC: 142 MMOL/L (ref 135–145)
URATE SERPL-MCNC: 5.7 MG/DL (ref 2.6–7.2)
WBC # SPEC AUTO: 6.4 X10(3)/MCL (ref 4.5–11.5)

## 2019-07-18 ENCOUNTER — HISTORICAL (OUTPATIENT)
Dept: LAB | Facility: HOSPITAL | Age: 64
End: 2019-07-18

## 2019-07-18 LAB
ABS NEUT (OLG): 4 X10(3)/MCL (ref 1.5–6.9)
BASOPHILS # BLD AUTO: 0.1 X10(3)/MCL (ref 0–0.1)
BASOPHILS NFR BLD AUTO: 1 % (ref 0–1)
EOSINOPHIL # BLD AUTO: 0.2 X10(3)/MCL (ref 0–0.6)
EOSINOPHIL NFR BLD AUTO: 3 % (ref 0–5)
ERYTHROCYTE [DISTWIDTH] IN BLOOD BY AUTOMATED COUNT: 15.8 % (ref 11.5–17)
FERRITIN SERPL-MCNC: 64 NG/ML (ref 3–252)
HCT VFR BLD AUTO: 28.4 % (ref 36–48)
HGB BLD-MCNC: 8.6 GM/DL (ref 12–16)
IMM GRANULOCYTES # BLD AUTO: 0.02 10*3/UL (ref 0–0.02)
IMM GRANULOCYTES NFR BLD AUTO: 0.3 % (ref 0–0.43)
IRON SATN MFR SERPL: 13 % (ref 15–55)
IRON SERPL-MCNC: 38 MCG/DL (ref 50–170)
LYMPHOCYTES # BLD AUTO: 1.3 X10(3)/MCL (ref 0.5–4.1)
LYMPHOCYTES NFR BLD AUTO: 21 % (ref 15–40)
MCH RBC QN AUTO: 28 PG (ref 27–34)
MCHC RBC AUTO-ENTMCNC: 30 GM/DL (ref 31–36)
MCV RBC AUTO: 92 FL (ref 80–99)
MONOCYTES # BLD AUTO: 0.4 X10(3)/MCL (ref 0–1.1)
MONOCYTES NFR BLD AUTO: 7 % (ref 4–12)
NEUTROPHILS # BLD AUTO: 4 X10(3)/MCL (ref 1.5–6.9)
NEUTROPHILS NFR BLD AUTO: 66 % (ref 43–75)
PLATELET # BLD AUTO: 209 X10(3)/MCL (ref 140–400)
PMV BLD AUTO: 11.4 FL (ref 6.8–10)
RBC # BLD AUTO: 3.08 X10(6)/MCL (ref 4.2–5.4)
TIBC SERPL-MCNC: 249 MCG/DL (ref 150–375)
TIBC SERPL-MCNC: 287 MCG/DL (ref 250–450)
TRANSFERRIN SERPL-MCNC: 229 MG/DL (ref 200–360)
WBC # SPEC AUTO: 6.1 X10(3)/MCL (ref 4.5–11.5)

## 2019-08-19 ENCOUNTER — HISTORICAL (OUTPATIENT)
Dept: INFUSION THERAPY | Facility: HOSPITAL | Age: 64
End: 2019-08-19

## 2019-08-26 ENCOUNTER — HISTORICAL (OUTPATIENT)
Dept: INFUSION THERAPY | Facility: HOSPITAL | Age: 64
End: 2019-08-26

## 2019-09-07 ENCOUNTER — HISTORICAL (OUTPATIENT)
Dept: ADMINISTRATIVE | Facility: HOSPITAL | Age: 64
End: 2019-09-07

## 2019-09-07 LAB
ABS NEUT (OLG): 3.2 X10(3)/MCL (ref 1.5–6.9)
BASOPHILS # BLD AUTO: 0.1 X10(3)/MCL (ref 0–0.1)
BASOPHILS NFR BLD AUTO: 1 % (ref 0–1)
BUN SERPL-MCNC: 15 MG/DL (ref 10–20)
CALCIUM SERPL-MCNC: 8.7 MG/DL (ref 8–10.5)
CHLORIDE SERPL-SCNC: 111 MMOL/L (ref 100–108)
CO2 SERPL-SCNC: 26 MMOL/L (ref 21–35)
CREAT SERPL-MCNC: 1.03 MG/DL (ref 0.7–1.3)
CREAT/UREA NIT SERPL: 15
EOSINOPHIL # BLD AUTO: 0.2 X10(3)/MCL (ref 0–0.6)
EOSINOPHIL NFR BLD AUTO: 4 % (ref 0–5)
ERYTHROCYTE [DISTWIDTH] IN BLOOD BY AUTOMATED COUNT: 16.9 % (ref 11.5–17)
GLUCOSE SERPL-MCNC: 122 MG/DL (ref 75–116)
HCT VFR BLD AUTO: 29.2 % (ref 36–48)
HGB BLD-MCNC: 8.6 GM/DL (ref 12–16)
HYPOCHROMIA BLD QL SMEAR: ABNORMAL
IMM GRANULOCYTES # BLD AUTO: 0.02 10*3/UL (ref 0–0.02)
IMM GRANULOCYTES NFR BLD AUTO: 0.4 % (ref 0–0.43)
LYMPHOCYTES # BLD AUTO: 1.1 X10(3)/MCL (ref 0.5–4.1)
LYMPHOCYTES NFR BLD AUTO: 23 % (ref 15–40)
MCH RBC QN AUTO: 28 PG (ref 27–34)
MCHC RBC AUTO-ENTMCNC: 30 GM/DL (ref 31–36)
MCV RBC AUTO: 95 FL (ref 80–99)
MONOCYTES # BLD AUTO: 0.3 X10(3)/MCL (ref 0–1.1)
MONOCYTES NFR BLD AUTO: 6 % (ref 4–12)
NEUTROPHILS # BLD AUTO: 3.2 X10(3)/MCL (ref 1.5–6.9)
NEUTROPHILS NFR BLD AUTO: 65 % (ref 43–75)
PLATELET # BLD AUTO: 160 X10(3)/MCL (ref 140–400)
PLATELET # BLD EST: ADEQUATE 10*3/UL
PMV BLD AUTO: 11.5 FL (ref 6.8–10)
POTASSIUM SERPL-SCNC: 4 MMOL/L (ref 3.6–5.2)
RBC # BLD AUTO: 3.08 X10(6)/MCL (ref 4.2–5.4)
RBC MORPH BLD: ABNORMAL
SODIUM SERPL-SCNC: 145 MMOL/L (ref 135–145)
WBC # SPEC AUTO: 4.9 X10(3)/MCL (ref 4.5–11.5)

## 2019-09-09 LAB
BUN SERPL-MCNC: 9 MG/DL (ref 10–20)
CALCIUM SERPL-MCNC: 8.4 MG/DL (ref 8–10.5)
CHLORIDE SERPL-SCNC: 107 MMOL/L (ref 100–108)
CO2 SERPL-SCNC: 29 MMOL/L (ref 21–35)
CREAT SERPL-MCNC: 0.99 MG/DL (ref 0.7–1.3)
CREAT/UREA NIT SERPL: 9
GLUCOSE SERPL-MCNC: 116 MG/DL (ref 75–116)
POTASSIUM SERPL-SCNC: 3.8 MMOL/L (ref 3.6–5.2)
SODIUM SERPL-SCNC: 144 MMOL/L (ref 135–145)

## 2019-09-16 LAB
ABS NEUT (OLG): 4 X10(3)/MCL (ref 1.5–6.9)
ALBUMIN SERPL-MCNC: 2.5 GM/DL (ref 3.4–5)
ALBUMIN/GLOB SERPL: 0.6 RATIO
ALP SERPL-CCNC: 84 UNIT/L (ref 30–113)
ALT SERPL-CCNC: 24 UNIT/L (ref 10–45)
AST SERPL-CCNC: 22 UNIT/L (ref 15–37)
BASOPHILS # BLD AUTO: 0.1 X10(3)/MCL (ref 0–0.1)
BASOPHILS NFR BLD AUTO: 2 % (ref 0–1)
BILIRUB SERPL-MCNC: 0.3 MG/DL (ref 0.1–0.9)
BILIRUBIN DIRECT+TOT PNL SERPL-MCNC: 0.1 MG/DL (ref 0–0.3)
BILIRUBIN DIRECT+TOT PNL SERPL-MCNC: 0.2 MG/DL
BUN SERPL-MCNC: 21 MG/DL (ref 10–20)
CALCIUM SERPL-MCNC: 9.1 MG/DL (ref 8–10.5)
CHLORIDE SERPL-SCNC: 104 MMOL/L (ref 100–108)
CO2 SERPL-SCNC: 31 MMOL/L (ref 21–35)
CREAT SERPL-MCNC: 1.25 MG/DL (ref 0.7–1.3)
EOSINOPHIL # BLD AUTO: 0.2 X10(3)/MCL (ref 0–0.6)
EOSINOPHIL NFR BLD AUTO: 4 % (ref 0–5)
ERYTHROCYTE [DISTWIDTH] IN BLOOD BY AUTOMATED COUNT: 18.2 % (ref 11.5–17)
GLOBULIN SER-MCNC: 4.3 GM/DL
GLUCOSE SERPL-MCNC: 137 MG/DL (ref 75–116)
HCT VFR BLD AUTO: 30.2 % (ref 36–48)
HGB BLD-MCNC: 9 GM/DL (ref 12–16)
IMM GRANULOCYTES # BLD AUTO: 0.02 10*3/UL (ref 0–0.02)
IMM GRANULOCYTES NFR BLD AUTO: 0.3 % (ref 0–0.43)
LYMPHOCYTES # BLD AUTO: 1.4 X10(3)/MCL (ref 0.5–4.1)
LYMPHOCYTES NFR BLD AUTO: 22 % (ref 15–40)
MCH RBC QN AUTO: 28 PG (ref 27–34)
MCHC RBC AUTO-ENTMCNC: 30 GM/DL (ref 31–36)
MCV RBC AUTO: 94 FL (ref 80–99)
MONOCYTES # BLD AUTO: 0.4 X10(3)/MCL (ref 0–1.1)
MONOCYTES NFR BLD AUTO: 7 % (ref 4–12)
NEUTROPHILS # BLD AUTO: 4 X10(3)/MCL (ref 1.5–6.9)
NEUTROPHILS NFR BLD AUTO: 64 % (ref 43–75)
PLATELET # BLD AUTO: 179 X10(3)/MCL (ref 140–400)
PMV BLD AUTO: 12 FL (ref 6.8–10)
POTASSIUM SERPL-SCNC: 4 MMOL/L (ref 3.6–5.2)
PROT SERPL-MCNC: 6.8 GM/DL (ref 6.4–8.2)
RBC # BLD AUTO: 3.22 X10(6)/MCL (ref 4.2–5.4)
SODIUM SERPL-SCNC: 142 MMOL/L (ref 135–145)
WBC # SPEC AUTO: 6.2 X10(3)/MCL (ref 4.5–11.5)

## 2020-01-06 ENCOUNTER — HISTORICAL (OUTPATIENT)
Dept: LAB | Facility: HOSPITAL | Age: 65
End: 2020-01-06

## 2020-01-06 LAB
ABS NEUT (OLG): 5.7 X10(3)/MCL (ref 1.5–6.9)
ALBUMIN SERPL-MCNC: 3.4 GM/DL (ref 3.4–5)
ALBUMIN/GLOB SERPL: 0.8 RATIO
ALP SERPL-CCNC: 108 UNIT/L (ref 30–113)
ALT SERPL-CCNC: 24 UNIT/L (ref 10–45)
AST SERPL-CCNC: 15 UNIT/L (ref 15–37)
BASOPHILS # BLD AUTO: 0.1 X10(3)/MCL (ref 0–0.1)
BASOPHILS NFR BLD AUTO: 1 % (ref 0–1)
BILIRUB SERPL-MCNC: 0.5 MG/DL (ref 0.1–0.9)
BILIRUBIN DIRECT+TOT PNL SERPL-MCNC: 0.1 MG/DL (ref 0–0.3)
BILIRUBIN DIRECT+TOT PNL SERPL-MCNC: 0.4 MG/DL
BUN SERPL-MCNC: 27 MG/DL (ref 10–20)
CALCIUM SERPL-MCNC: 9.5 MG/DL (ref 8–10.5)
CHLORIDE SERPL-SCNC: 104 MMOL/L (ref 100–108)
CHOLEST SERPL-MCNC: 215 MG/DL (ref 140–200)
CHOLEST/HDLC SERPL: 6 MG/DL (ref 0–8)
CO2 SERPL-SCNC: 23 MMOL/L (ref 21–35)
CREAT SERPL-MCNC: 1.63 MG/DL (ref 0.7–1.3)
EOSINOPHIL # BLD AUTO: 0.2 X10(3)/MCL (ref 0–0.6)
EOSINOPHIL NFR BLD AUTO: 2 % (ref 0–5)
ERYTHROCYTE [DISTWIDTH] IN BLOOD BY AUTOMATED COUNT: 15.9 % (ref 11.5–17)
EST. AVERAGE GLUCOSE BLD GHB EST-MCNC: 126 MG/DL
GLOBULIN SER-MCNC: 4.2 GM/DL
GLUCOSE SERPL-MCNC: 123 MG/DL (ref 75–116)
HBA1C MFR BLD: 6 % (ref 4.8–6)
HCT VFR BLD AUTO: 41.6 % (ref 36–48)
HDLC SERPL-MCNC: 38 MG/DL (ref 35–59)
HGB BLD-MCNC: 12.6 GM/DL (ref 12–16)
IMM GRANULOCYTES # BLD AUTO: 0.03 10*3/UL (ref 0–0.02)
IMM GRANULOCYTES NFR BLD AUTO: 0.3 % (ref 0–0.43)
LDLC SERPL CALC-MCNC: 139 MG/DL (ref 100–129)
LYMPHOCYTES # BLD AUTO: 3 X10(3)/MCL (ref 0.5–4.1)
LYMPHOCYTES NFR BLD AUTO: 31 % (ref 15–40)
MCH RBC QN AUTO: 28 PG (ref 27–34)
MCHC RBC AUTO-ENTMCNC: 30 GM/DL (ref 31–36)
MCV RBC AUTO: 92 FL (ref 80–99)
MONOCYTES # BLD AUTO: 0.7 X10(3)/MCL (ref 0–1.1)
MONOCYTES NFR BLD AUTO: 7 % (ref 4–12)
NEUTROPHILS # BLD AUTO: 5.7 X10(3)/MCL (ref 1.5–6.9)
NEUTROPHILS NFR BLD AUTO: 59 % (ref 43–75)
PLATELET # BLD AUTO: 220 X10(3)/MCL (ref 140–400)
PMV BLD AUTO: 11.2 FL (ref 6.8–10)
POTASSIUM SERPL-SCNC: 4.7 MMOL/L (ref 3.6–5.2)
PROT SERPL-MCNC: 7.6 GM/DL (ref 6.4–8.2)
RBC # BLD AUTO: 4.53 X10(6)/MCL (ref 4.2–5.4)
SODIUM SERPL-SCNC: 141 MMOL/L (ref 135–145)
TRIGL SERPL-MCNC: 225 MG/DL (ref 35–150)
VLDLC SERPL CALC-MCNC: 45 MG/DL
WBC # SPEC AUTO: 9.7 X10(3)/MCL (ref 4.5–11.5)

## 2020-01-23 ENCOUNTER — HISTORICAL (OUTPATIENT)
Dept: RADIOLOGY | Facility: HOSPITAL | Age: 65
End: 2020-01-23

## 2020-05-08 ENCOUNTER — HISTORICAL (OUTPATIENT)
Dept: LAB | Facility: HOSPITAL | Age: 65
End: 2020-05-08

## 2020-05-08 LAB
ABS NEUT (OLG): 4.9 X10(3)/MCL (ref 1.5–6.9)
ALBUMIN SERPL-MCNC: 3.2 GM/DL (ref 3.4–4.8)
ALBUMIN/GLOB SERPL: 0.9 RATIO (ref 1.1–2)
ALP SERPL-CCNC: 122 UNIT/L (ref 40–150)
ALT SERPL-CCNC: 13 UNIT/L (ref 0–55)
AST SERPL-CCNC: 14 UNIT/L (ref 5–34)
BASOPHILS # BLD AUTO: 0.1 X10(3)/MCL (ref 0–0.1)
BASOPHILS NFR BLD AUTO: 1 % (ref 0–1)
BILIRUB SERPL-MCNC: 0.8 MG/DL
BILIRUBIN DIRECT+TOT PNL SERPL-MCNC: 0.3 MG/DL (ref 0–0.5)
BILIRUBIN DIRECT+TOT PNL SERPL-MCNC: 0.5 MG/DL (ref 0–0.8)
BUN SERPL-MCNC: 22 MG/DL (ref 9.8–20.1)
CALCIUM SERPL-MCNC: 9.4 MG/DL (ref 8.4–10.2)
CHLORIDE SERPL-SCNC: 105 MMOL/L (ref 98–107)
CHOLEST SERPL-MCNC: 171 MG/DL
CHOLEST/HDLC SERPL: 7 {RATIO} (ref 0–5)
CO2 SERPL-SCNC: 22 MMOL/L (ref 23–31)
CREAT SERPL-MCNC: 1.07 MG/DL (ref 0.55–1.02)
EOSINOPHIL # BLD AUTO: 0.1 X10(3)/MCL (ref 0–0.6)
EOSINOPHIL NFR BLD AUTO: 2 % (ref 0–5)
ERYTHROCYTE [DISTWIDTH] IN BLOOD BY AUTOMATED COUNT: 14.7 % (ref 11.5–17)
GLOBULIN SER-MCNC: 3.7 GM/DL (ref 2.4–3.5)
GLUCOSE SERPL-MCNC: 150 MG/DL (ref 82–115)
HCT VFR BLD AUTO: 40.8 % (ref 36–48)
HDLC SERPL-MCNC: 26 MG/DL (ref 35–60)
HGB BLD-MCNC: 12.5 GM/DL (ref 12–16)
IMM GRANULOCYTES # BLD AUTO: 0.02 10*3/UL (ref 0–0.02)
IMM GRANULOCYTES NFR BLD AUTO: 0.3 % (ref 0–0.43)
LDLC SERPL CALC-MCNC: 86 MG/DL (ref 50–140)
LYMPHOCYTES # BLD AUTO: 2.1 X10(3)/MCL (ref 0.5–4.1)
LYMPHOCYTES NFR BLD AUTO: 28 % (ref 15–40)
MCH RBC QN AUTO: 28 PG (ref 27–34)
MCHC RBC AUTO-ENTMCNC: 31 GM/DL (ref 31–36)
MCV RBC AUTO: 91 FL (ref 80–99)
MONOCYTES # BLD AUTO: 0.4 X10(3)/MCL (ref 0–1.1)
MONOCYTES NFR BLD AUTO: 5 % (ref 4–12)
NEUTROPHILS # BLD AUTO: 4.9 X10(3)/MCL (ref 1.5–6.9)
NEUTROPHILS NFR BLD AUTO: 64 % (ref 43–75)
PLATELET # BLD AUTO: 209 X10(3)/MCL (ref 140–400)
PMV BLD AUTO: 11.8 FL (ref 6.8–10)
POTASSIUM SERPL-SCNC: 3.8 MMOL/L (ref 3.5–5.1)
PROT SERPL-MCNC: 6.9 GM/DL (ref 5.8–7.6)
RBC # BLD AUTO: 4.48 X10(6)/MCL (ref 4.2–5.4)
SODIUM SERPL-SCNC: 141 MMOL/L (ref 136–145)
TRIGL SERPL-MCNC: 293 MG/DL (ref 37–140)
VLDLC SERPL CALC-MCNC: 59 MG/DL
WBC # SPEC AUTO: 7.6 X10(3)/MCL (ref 4.5–11.5)

## 2021-02-05 ENCOUNTER — HISTORICAL (OUTPATIENT)
Dept: LAB | Facility: HOSPITAL | Age: 66
End: 2021-02-05

## 2021-02-05 LAB
ABS NEUT (OLG): 4 X10(3)/MCL (ref 1.5–6.9)
ALBUMIN SERPL-MCNC: 3.5 GM/DL (ref 3.4–4.8)
ALBUMIN/GLOB SERPL: 0.9 RATIO (ref 1.1–2)
ALP SERPL-CCNC: 123 UNIT/L (ref 40–150)
ALT SERPL-CCNC: 16 UNIT/L (ref 0–55)
AST SERPL-CCNC: 21 UNIT/L (ref 5–34)
BASOPHILS # BLD AUTO: 0.1 X10(3)/MCL (ref 0–0.1)
BASOPHILS NFR BLD AUTO: 1 % (ref 0–1)
BILIRUB SERPL-MCNC: 0.6 MG/DL
BILIRUBIN DIRECT+TOT PNL SERPL-MCNC: 0.2 MG/DL (ref 0–0.5)
BILIRUBIN DIRECT+TOT PNL SERPL-MCNC: 0.4 MG/DL (ref 0–0.8)
BUN SERPL-MCNC: 27 MG/DL (ref 9.8–20.1)
CALCIUM SERPL-MCNC: 9.6 MG/DL (ref 8.4–10.2)
CHLORIDE SERPL-SCNC: 103 MMOL/L (ref 98–107)
CHOLEST SERPL-MCNC: 175 MG/DL
CHOLEST/HDLC SERPL: 8 {RATIO} (ref 0–5)
CO2 SERPL-SCNC: 23 MMOL/L (ref 23–31)
CREAT SERPL-MCNC: 1 MG/DL (ref 0.55–1.02)
EOSINOPHIL # BLD AUTO: 0.1 X10(3)/MCL (ref 0–0.6)
EOSINOPHIL NFR BLD AUTO: 2 % (ref 0–5)
ERYTHROCYTE [DISTWIDTH] IN BLOOD BY AUTOMATED COUNT: 14.1 % (ref 11.5–17)
ERYTHROCYTE [SEDIMENTATION RATE] IN BLOOD: 33 MM/HR (ref 0–20)
EST. AVERAGE GLUCOSE BLD GHB EST-MCNC: 125.5 MG/DL
GLOBULIN SER-MCNC: 3.9 GM/DL (ref 2.4–3.5)
GLUCOSE SERPL-MCNC: 150 MG/DL (ref 82–115)
HBA1C MFR BLD: 6 %
HCT VFR BLD AUTO: 39.5 % (ref 36–48)
HDLC SERPL-MCNC: 23 MG/DL (ref 35–60)
HGB BLD-MCNC: 12.6 GM/DL (ref 12–16)
IMM GRANULOCYTES # BLD AUTO: 0.03 10*3/UL (ref 0–0.02)
IMM GRANULOCYTES NFR BLD AUTO: 0.4 % (ref 0–0.43)
LDLC SERPL CALC-MCNC: 82 MG/DL (ref 50–140)
LYMPHOCYTES # BLD AUTO: 2.1 X10(3)/MCL (ref 0.5–4.1)
LYMPHOCYTES NFR BLD AUTO: 31 % (ref 15–40)
MCH RBC QN AUTO: 29 PG (ref 27–34)
MCHC RBC AUTO-ENTMCNC: 32 GM/DL (ref 31–36)
MCV RBC AUTO: 90 FL (ref 80–99)
MONOCYTES # BLD AUTO: 0.4 X10(3)/MCL (ref 0–1.1)
MONOCYTES NFR BLD AUTO: 6 % (ref 4–12)
NEUTROPHILS # BLD AUTO: 4 X10(3)/MCL (ref 1.5–6.9)
NEUTROPHILS NFR BLD AUTO: 60 % (ref 43–75)
PLATELET # BLD AUTO: 197 X10(3)/MCL (ref 140–400)
PMV BLD AUTO: 11.5 FL (ref 6.8–10)
POTASSIUM SERPL-SCNC: 4.1 MMOL/L (ref 3.5–5.1)
PROT SERPL-MCNC: 7.4 GM/DL (ref 5.8–7.6)
RBC # BLD AUTO: 4.38 X10(6)/MCL (ref 4.2–5.4)
RHEUMATOID FACT SERPL-ACNC: <13 IU/ML
SODIUM SERPL-SCNC: 139 MMOL/L (ref 136–145)
TRIGL SERPL-MCNC: 349 MG/DL (ref 37–140)
URATE SERPL-MCNC: 5.9 MG/DL (ref 2.6–6)
VLDLC SERPL CALC-MCNC: 70 MG/DL
WBC # SPEC AUTO: 6.7 X10(3)/MCL (ref 4.5–11.5)

## 2021-04-08 ENCOUNTER — HISTORICAL (OUTPATIENT)
Dept: RADIOLOGY | Facility: HOSPITAL | Age: 66
End: 2021-04-08

## 2021-04-27 ENCOUNTER — HISTORICAL (OUTPATIENT)
Dept: LAB | Facility: HOSPITAL | Age: 66
End: 2021-04-27

## 2021-04-27 LAB
ABS NEUT (OLG): 5.4 X10(3)/MCL (ref 1.5–6.9)
ALBUMIN SERPL-MCNC: 3.5 GM/DL (ref 3.4–4.8)
ALBUMIN/GLOB SERPL: 0.9 RATIO (ref 1.1–2)
ALP SERPL-CCNC: 132 UNIT/L (ref 40–150)
ALT SERPL-CCNC: 13 UNIT/L (ref 0–55)
AST SERPL-CCNC: 13 UNIT/L (ref 5–34)
BASOPHILS # BLD AUTO: 0.1 X10(3)/MCL (ref 0–0.1)
BASOPHILS NFR BLD AUTO: 1 % (ref 0–1)
BILIRUB SERPL-MCNC: 0.8 MG/DL
BILIRUBIN DIRECT+TOT PNL SERPL-MCNC: 0.3 MG/DL (ref 0–0.5)
BILIRUBIN DIRECT+TOT PNL SERPL-MCNC: 0.5 MG/DL (ref 0–0.8)
BUN SERPL-MCNC: 24 MG/DL (ref 9.8–20.1)
CALCIUM SERPL-MCNC: 9.5 MG/DL (ref 8.4–10.2)
CHLORIDE SERPL-SCNC: 102 MMOL/L (ref 98–107)
CHOLEST SERPL-MCNC: 173 MG/DL
CHOLEST/HDLC SERPL: 6 {RATIO} (ref 0–5)
CO2 SERPL-SCNC: 27 MMOL/L (ref 23–31)
CREAT SERPL-MCNC: 1.07 MG/DL (ref 0.55–1.02)
EOSINOPHIL # BLD AUTO: 0.2 X10(3)/MCL (ref 0–0.6)
EOSINOPHIL NFR BLD AUTO: 2 % (ref 0–5)
ERYTHROCYTE [DISTWIDTH] IN BLOOD BY AUTOMATED COUNT: 14.1 % (ref 11.5–17)
EST. AVERAGE GLUCOSE BLD GHB EST-MCNC: 131.2 MG/DL
GLOBULIN SER-MCNC: 3.8 GM/DL (ref 2.4–3.5)
GLUCOSE SERPL-MCNC: 132 MG/DL (ref 82–115)
HBA1C MFR BLD: 6.2 %
HCT VFR BLD AUTO: 42.6 % (ref 36–48)
HDLC SERPL-MCNC: 27 MG/DL (ref 35–60)
HGB BLD-MCNC: 13.5 GM/DL (ref 12–16)
IMM GRANULOCYTES # BLD AUTO: 0.04 10*3/UL (ref 0–0.02)
IMM GRANULOCYTES NFR BLD AUTO: 0.4 % (ref 0–0.43)
LDLC SERPL CALC-MCNC: 90 MG/DL (ref 50–140)
LYMPHOCYTES # BLD AUTO: 3 X10(3)/MCL (ref 0.5–4.1)
LYMPHOCYTES NFR BLD AUTO: 32 % (ref 15–40)
MCH RBC QN AUTO: 29 PG (ref 27–34)
MCHC RBC AUTO-ENTMCNC: 32 GM/DL (ref 31–36)
MCV RBC AUTO: 91 FL (ref 80–99)
MONOCYTES # BLD AUTO: 0.5 X10(3)/MCL (ref 0–1.1)
MONOCYTES NFR BLD AUTO: 6 % (ref 4–12)
NEUTROPHILS # BLD AUTO: 5.4 X10(3)/MCL (ref 1.5–6.9)
NEUTROPHILS NFR BLD AUTO: 59 % (ref 43–75)
PLATELET # BLD AUTO: 222 X10(3)/MCL (ref 140–400)
PMV BLD AUTO: 11.3 FL (ref 6.8–10)
POTASSIUM SERPL-SCNC: 3.9 MMOL/L (ref 3.5–5.1)
PROT SERPL-MCNC: 7.3 GM/DL (ref 5.8–7.6)
RBC # BLD AUTO: 4.68 X10(6)/MCL (ref 4.2–5.4)
SODIUM SERPL-SCNC: 140 MMOL/L (ref 136–145)
TRIGL SERPL-MCNC: 282 MG/DL (ref 37–140)
VLDLC SERPL CALC-MCNC: 56 MG/DL
WBC # SPEC AUTO: 9.1 X10(3)/MCL (ref 4.5–11.5)

## 2021-07-27 ENCOUNTER — HISTORICAL (OUTPATIENT)
Dept: LAB | Facility: HOSPITAL | Age: 66
End: 2021-07-27

## 2021-07-27 LAB
ABS NEUT (OLG): 4.3 X10(3)/MCL (ref 1.5–6.9)
ALBUMIN SERPL-MCNC: 3.1 GM/DL (ref 3.4–4.8)
ALBUMIN/GLOB SERPL: 0.8 RATIO (ref 1.1–2)
ALP SERPL-CCNC: 101 UNIT/L (ref 40–150)
ALT SERPL-CCNC: 15 UNIT/L (ref 0–55)
AST SERPL-CCNC: 19 UNIT/L (ref 5–34)
BASOPHILS # BLD AUTO: 0 X10(3)/MCL (ref 0–0.1)
BASOPHILS NFR BLD AUTO: 0 % (ref 0–1)
BILIRUB SERPL-MCNC: 1.5 MG/DL
BILIRUBIN DIRECT+TOT PNL SERPL-MCNC: 0.7 MG/DL (ref 0–0.5)
BILIRUBIN DIRECT+TOT PNL SERPL-MCNC: 0.8 MG/DL (ref 0–0.8)
BUN SERPL-MCNC: 19 MG/DL (ref 9.8–20.1)
CALCIUM SERPL-MCNC: 9.4 MG/DL (ref 8.4–10.2)
CHLORIDE SERPL-SCNC: 106 MMOL/L (ref 98–107)
CHOLEST SERPL-MCNC: 95 MG/DL
CHOLEST/HDLC SERPL: 4 {RATIO} (ref 0–5)
CO2 SERPL-SCNC: 21 MMOL/L (ref 23–31)
CREAT SERPL-MCNC: 1.1 MG/DL (ref 0.55–1.02)
DEPRECATED CALCIDIOL+CALCIFEROL SERPL-MC: 16.5 NG/ML (ref 30–80)
EOSINOPHIL # BLD AUTO: 0 X10(3)/MCL (ref 0–0.6)
EOSINOPHIL NFR BLD AUTO: 0 % (ref 0–5)
ERYTHROCYTE [DISTWIDTH] IN BLOOD BY AUTOMATED COUNT: 14.6 % (ref 11.5–17)
EST. AVERAGE GLUCOSE BLD GHB EST-MCNC: 128.4 MG/DL
GLOBULIN SER-MCNC: 3.8 GM/DL (ref 2.4–3.5)
GLUCOSE SERPL-MCNC: 137 MG/DL (ref 82–115)
HBA1C MFR BLD: 6.1 %
HCT VFR BLD AUTO: 38.2 % (ref 36–48)
HDLC SERPL-MCNC: 27 MG/DL (ref 35–60)
HGB BLD-MCNC: 11.9 GM/DL (ref 12–16)
IMM GRANULOCYTES # BLD AUTO: 0.04 10*3/UL (ref 0–0.02)
IMM GRANULOCYTES NFR BLD AUTO: 0.5 % (ref 0–0.43)
LDLC SERPL CALC-MCNC: 36 MG/DL (ref 50–140)
LYMPHOCYTES # BLD AUTO: 3.1 X10(3)/MCL (ref 0.5–4.1)
LYMPHOCYTES NFR BLD AUTO: 39 % (ref 15–40)
MCH RBC QN AUTO: 28 PG (ref 27–34)
MCHC RBC AUTO-ENTMCNC: 31 GM/DL (ref 31–36)
MCV RBC AUTO: 90 FL (ref 80–99)
MONOCYTES # BLD AUTO: 0.4 X10(3)/MCL (ref 0–1.1)
MONOCYTES NFR BLD AUTO: 5 % (ref 4–12)
NEUTROPHILS # BLD AUTO: 4.3 X10(3)/MCL (ref 1.5–6.9)
NEUTROPHILS NFR BLD AUTO: 54 % (ref 43–75)
PLATELET # BLD AUTO: 167 X10(3)/MCL (ref 140–400)
PMV BLD AUTO: 11.7 FL (ref 6.8–10)
POTASSIUM SERPL-SCNC: 4.3 MMOL/L (ref 3.5–5.1)
PROT SERPL-MCNC: 6.9 GM/DL (ref 5.8–7.6)
RBC # BLD AUTO: 4.24 X10(6)/MCL (ref 4.2–5.4)
SODIUM SERPL-SCNC: 140 MMOL/L (ref 136–145)
TRIGL SERPL-MCNC: 161 MG/DL (ref 37–140)
VLDLC SERPL CALC-MCNC: 32 MG/DL
WBC # SPEC AUTO: 7.9 X10(3)/MCL (ref 4.5–11.5)

## 2021-11-01 ENCOUNTER — HISTORICAL (OUTPATIENT)
Dept: LAB | Facility: HOSPITAL | Age: 66
End: 2021-11-01

## 2021-11-01 LAB
ABS NEUT (OLG): 5.8 X10(3)/MCL (ref 1.5–6.9)
ALBUMIN SERPL-MCNC: 3.6 GM/DL (ref 3.4–4.8)
ALBUMIN/GLOB SERPL: 0.9 RATIO (ref 1.1–2)
ALP SERPL-CCNC: 93 UNIT/L (ref 40–150)
ALT SERPL-CCNC: 18 UNIT/L (ref 0–55)
AST SERPL-CCNC: 23 UNIT/L (ref 5–34)
BASOPHILS # BLD AUTO: 0.1 X10(3)/MCL (ref 0–0.1)
BASOPHILS NFR BLD AUTO: 1 % (ref 0–1)
BILIRUB SERPL-MCNC: 0.6 MG/DL
BILIRUBIN DIRECT+TOT PNL SERPL-MCNC: 0.2 MG/DL (ref 0–0.5)
BILIRUBIN DIRECT+TOT PNL SERPL-MCNC: 0.4 MG/DL (ref 0–0.8)
BUN SERPL-MCNC: 19 MG/DL (ref 9.8–20.1)
CALCIUM SERPL-MCNC: 9.9 MG/DL (ref 8.7–10.5)
CHLORIDE SERPL-SCNC: 105 MMOL/L (ref 98–107)
CHOLEST SERPL-MCNC: 151 MG/DL
CHOLEST/HDLC SERPL: 4 {RATIO} (ref 0–5)
CO2 SERPL-SCNC: 24 MMOL/L (ref 23–31)
CREAT SERPL-MCNC: 0.92 MG/DL (ref 0.55–1.02)
CREAT UR-MCNC: 131.9 MG/DL (ref 45–106)
EOSINOPHIL # BLD AUTO: 0.1 X10(3)/MCL (ref 0–0.6)
EOSINOPHIL NFR BLD AUTO: 1 % (ref 0–5)
ERYTHROCYTE [DISTWIDTH] IN BLOOD BY AUTOMATED COUNT: 14.8 % (ref 11.5–17)
EST. AVERAGE GLUCOSE BLD GHB EST-MCNC: 128.4 MG/DL
GLOBULIN SER-MCNC: 4 GM/DL (ref 2.4–3.5)
GLUCOSE SERPL-MCNC: 168 MG/DL (ref 82–115)
HBA1C MFR BLD: 6.1 %
HCT VFR BLD AUTO: 40.1 % (ref 36–48)
HDLC SERPL-MCNC: 34 MG/DL (ref 35–60)
HGB BLD-MCNC: 12.3 GM/DL (ref 12–16)
IMM GRANULOCYTES # BLD AUTO: 0.02 10*3/UL (ref 0–0.02)
IMM GRANULOCYTES NFR BLD AUTO: 0.2 % (ref 0–0.43)
LDLC SERPL CALC-MCNC: 70 MG/DL (ref 50–140)
LYMPHOCYTES # BLD AUTO: 1.9 X10(3)/MCL (ref 0.5–4.1)
LYMPHOCYTES NFR BLD AUTO: 23 % (ref 15–40)
MCH RBC QN AUTO: 28 PG (ref 27–34)
MCHC RBC AUTO-ENTMCNC: 31 GM/DL (ref 31–36)
MCV RBC AUTO: 90 FL (ref 80–99)
MICROALBUMIN UR-MCNC: 184.1 UG/ML
MICROALBUMIN/CREAT RATIO PNL UR: 139.6 MG/GM CR (ref 0–30)
MONOCYTES # BLD AUTO: 0.4 X10(3)/MCL (ref 0–1.1)
MONOCYTES NFR BLD AUTO: 5 % (ref 4–12)
NEUTROPHILS # BLD AUTO: 5.8 X10(3)/MCL (ref 1.5–6.9)
NEUTROPHILS NFR BLD AUTO: 70 % (ref 43–75)
PLATELET # BLD AUTO: 205 X10(3)/MCL (ref 140–400)
PMV BLD AUTO: 11.5 FL (ref 6.8–10)
POTASSIUM SERPL-SCNC: 4.1 MMOL/L (ref 3.5–5.1)
PROT SERPL-MCNC: 7.6 GM/DL (ref 5.8–7.6)
RBC # BLD AUTO: 4.43 X10(6)/MCL (ref 4.2–5.4)
SODIUM SERPL-SCNC: 140 MMOL/L (ref 136–145)
TRIGL SERPL-MCNC: 234 MG/DL (ref 37–140)
URATE SERPL-MCNC: 6.3 MG/DL (ref 2.6–6)
VLDLC SERPL CALC-MCNC: 47 MG/DL
WBC # SPEC AUTO: 8.3 X10(3)/MCL (ref 4.5–11.5)

## 2022-01-18 ENCOUNTER — HISTORICAL (OUTPATIENT)
Dept: LAB | Facility: HOSPITAL | Age: 67
End: 2022-01-18

## 2022-01-18 LAB
ABS NEUT (OLG): 5 X10(3)/MCL (ref 1.5–6.9)
ALBUMIN SERPL-MCNC: 3.6 GM/DL (ref 3.4–4.8)
ALBUMIN/GLOB SERPL: 1.1 RATIO (ref 1.1–2)
ALP SERPL-CCNC: 89 UNIT/L (ref 40–150)
ALT SERPL-CCNC: 11 UNIT/L (ref 0–55)
AST SERPL-CCNC: 14 UNIT/L (ref 5–34)
BASOPHILS # BLD AUTO: 0.1 X10(3)/MCL (ref 0–0.1)
BASOPHILS NFR BLD AUTO: 1 % (ref 0–1)
BILIRUB SERPL-MCNC: 0.8 MG/DL
BILIRUBIN DIRECT+TOT PNL SERPL-MCNC: 0.3 MG/DL (ref 0–0.5)
BILIRUBIN DIRECT+TOT PNL SERPL-MCNC: 0.5 MG/DL (ref 0–0.8)
BUN SERPL-MCNC: 26 MG/DL (ref 9.8–20.1)
CALCIUM SERPL-MCNC: 10.2 MG/DL (ref 8.7–10.5)
CHLORIDE SERPL-SCNC: 107 MMOL/L (ref 98–107)
CO2 SERPL-SCNC: 28 MMOL/L (ref 23–31)
CREAT SERPL-MCNC: 1.38 MG/DL (ref 0.55–1.02)
EOSINOPHIL # BLD AUTO: 0.1 X10(3)/MCL (ref 0–0.6)
EOSINOPHIL NFR BLD AUTO: 2 % (ref 0–5)
ERYTHROCYTE [DISTWIDTH] IN BLOOD BY AUTOMATED COUNT: 14.4 % (ref 11.5–17)
GLOBULIN SER-MCNC: 3.2 GM/DL (ref 2.4–3.5)
GLUCOSE SERPL-MCNC: 159 MG/DL (ref 82–115)
HCT VFR BLD AUTO: 38.8 % (ref 36–48)
HGB BLD-MCNC: 11.7 GM/DL (ref 12–16)
IMM GRANULOCYTES # BLD AUTO: 0.03 10*3/UL (ref 0–0.02)
IMM GRANULOCYTES NFR BLD AUTO: 0.4 % (ref 0–0.43)
LYMPHOCYTES # BLD AUTO: 1.8 X10(3)/MCL (ref 0.5–4.1)
LYMPHOCYTES NFR BLD AUTO: 25 % (ref 15–40)
MCH RBC QN AUTO: 28 PG (ref 27–34)
MCHC RBC AUTO-ENTMCNC: 30 GM/DL (ref 31–36)
MCV RBC AUTO: 91 FL (ref 80–99)
MONOCYTES # BLD AUTO: 0.5 X10(3)/MCL (ref 0–1.1)
MONOCYTES NFR BLD AUTO: 6 % (ref 4–12)
NEUTROPHILS # BLD AUTO: 5 X10(3)/MCL (ref 1.5–6.9)
NEUTROPHILS NFR BLD AUTO: 66 % (ref 43–75)
PLATELET # BLD AUTO: 188 X10(3)/MCL (ref 140–400)
PMV BLD AUTO: 11.8 FL (ref 6.8–10)
POTASSIUM SERPL-SCNC: 5 MMOL/L (ref 3.5–5.1)
PROT SERPL-MCNC: 6.8 GM/DL (ref 5.8–7.6)
RBC # BLD AUTO: 4.25 X10(6)/MCL (ref 4.2–5.4)
SODIUM SERPL-SCNC: 143 MMOL/L (ref 136–145)
WBC # SPEC AUTO: 7.5 X10(3)/MCL (ref 4.5–11.5)

## 2022-01-21 ENCOUNTER — HISTORICAL (OUTPATIENT)
Dept: ENDOSCOPY | Facility: HOSPITAL | Age: 67
End: 2022-01-21

## 2022-01-28 ENCOUNTER — HISTORICAL (OUTPATIENT)
Dept: LAB | Facility: HOSPITAL | Age: 67
End: 2022-01-28

## 2022-01-28 LAB
ABS NEUT (OLG): 6.1 (ref 1.5–6.9)
ALBUMIN SERPL-MCNC: 3.6 G/DL (ref 3.4–4.8)
ALBUMIN/GLOB SERPL: 1 {RATIO} (ref 1.1–2)
ALP SERPL-CCNC: 92 U/L (ref 40–150)
ALT SERPL-CCNC: 15 U/L (ref 0–55)
AST SERPL-CCNC: 19 U/L (ref 5–34)
BASOPHILS # BLD AUTO: 0.1 10*3/UL (ref 0–0.1)
BASOPHILS NFR BLD AUTO: 1 % (ref 0–1)
BILIRUB SERPL-MCNC: 0.6 MG/DL
BILIRUBIN DIRECT+TOT PNL SERPL-MCNC: 0.2 (ref 0–0.5)
BILIRUBIN DIRECT+TOT PNL SERPL-MCNC: 0.4 (ref 0–0.8)
BUN SERPL-MCNC: 29 MG/DL (ref 9.8–20.1)
CALCIUM SERPL-MCNC: 10.8 MG/DL (ref 8.7–10.5)
CHLORIDE SERPL-SCNC: 99 MMOL/L (ref 98–107)
CHOLEST SERPL-MCNC: 153 MG/DL
CHOLEST/HDLC SERPL: 5 {RATIO} (ref 0–5)
CO2 SERPL-SCNC: 29 MMOL/L (ref 23–31)
CREAT SERPL-MCNC: 1.22 MG/DL (ref 0.55–1.02)
CREAT UR-MCNC: 123.8 MG/DL (ref 45–106)
EOSINOPHIL # BLD AUTO: 0.1 10*3/UL (ref 0–0.6)
EOSINOPHIL NFR BLD AUTO: 2 % (ref 0–5)
ERYTHROCYTE [DISTWIDTH] IN BLOOD BY AUTOMATED COUNT: 14.1 % (ref 11.5–17)
EST. AVERAGE GLUCOSE BLD GHB EST-MCNC: 122.6 MG/DL
GLOBULIN SER-MCNC: 3.5 G/DL (ref 2.4–3.5)
GLUCOSE SERPL-MCNC: 189 MG/DL (ref 82–115)
HBA1C MFR BLD: 5.9 %
HCT VFR BLD AUTO: 39.1 % (ref 36–48)
HDLC SERPL-MCNC: 29 MG/DL (ref 35–60)
HEMOLYSIS INTERF INDEX SERPL-ACNC: 10
HGB BLD-MCNC: 12.3 G/DL (ref 12–16)
ICTERIC INTERF INDEX SERPL-ACNC: 0
IMM GRANULOCYTES # BLD AUTO: 0.02 10*3/UL (ref 0–0.02)
IMM GRANULOCYTES NFR BLD AUTO: 0.2 % (ref 0–0.43)
LDLC SERPL CALC-MCNC: 45 MG/DL (ref 50–140)
LIPEMIC INTERF INDEX SERPL-ACNC: >10
LYMPHOCYTES # BLD AUTO: 2.7 10*3/UL (ref 0.5–4.1)
LYMPHOCYTES NFR BLD AUTO: 28 % (ref 15–40)
MANUAL DIFF? (OHS): NO
MCH RBC QN AUTO: 28 PG (ref 27–34)
MCHC RBC AUTO-ENTMCNC: 32 G/DL (ref 31–36)
MCV RBC AUTO: 89 FL (ref 80–99)
MICROALBUMIN UR-MCNC: 72.4
MICROALBUMIN/CREAT RATIO PNL UR: 58.5 (ref 0–30)
MONOCYTES # BLD AUTO: 0.6 10*3/UL (ref 0–1.1)
MONOCYTES NFR BLD AUTO: 6 % (ref 4–12)
NEUTROPHILS # BLD AUTO: 6.1 10*3/UL (ref 1.5–6.9)
NEUTROPHILS NFR BLD AUTO: 64 % (ref 43–75)
PLATELET # BLD AUTO: 209 10*3/UL (ref 140–400)
PMV BLD AUTO: 11.6 FL (ref 6.8–10)
POTASSIUM SERPL-SCNC: 4.3 MMOL/L (ref 3.5–5.1)
PROT SERPL-MCNC: 7.1 G/DL (ref 5.8–7.6)
RBC # BLD AUTO: 4.38 10*6/UL (ref 4.2–5.4)
SODIUM SERPL-SCNC: 143 MMOL/L (ref 136–145)
TRIGL SERPL-MCNC: 396 MG/DL (ref 37–140)
URATE SERPL-MCNC: 5.9 MG/DL (ref 2.6–6)
VLDLC SERPL CALC-MCNC: 79 MG/DL
WBC # SPEC AUTO: 9.6 10*3/UL (ref 4.5–11.5)

## 2022-04-11 ENCOUNTER — HISTORICAL (OUTPATIENT)
Dept: ADMINISTRATIVE | Facility: HOSPITAL | Age: 67
End: 2022-04-11

## 2022-04-11 ENCOUNTER — HISTORICAL (OUTPATIENT)
Dept: RADIOLOGY | Facility: HOSPITAL | Age: 67
End: 2022-04-11

## 2022-04-29 NOTE — PROGRESS NOTES
Patient:   Vi Sultana             MRN: [id number]            FIN: [id number]               Age:   62 years     Sex:  Female     :  1955   Associated Diagnoses:   None   Author:   Redd CHAN, Padma LUEVANO      Visit Information        Referring physician:    Dr. Jcarlos Montana    Problem List:  Normocytic Anemia  17  WBC 8.1, Hgb 9.9, Hct 31.7, MCV 86, RDW 16.3%, plts 230                  TSH 2.48  EGD  17:  Small hiatal hernia; multiple, small gastric polyps  Colonoscopy 10/17/2016:  Scattered diverticula in the R colon; 2 polyps in the ascending colon and 1 in the transverse colon.      Current Treatment:  Fergon BID -->    Treatment Completed:   Transfusion PRBCs in the past    Clinical History:  Ms. Sultana is a 62 yo female referred for evaluation of anemia.  She has a PMH of DM, HTN, morbid obesity, LE edema, CVA and anemia.  She reports requiring a blood transfusion approx 3 years ago and since then, she has had other procedures in an effort to find out why she might be anemic.  In 10/2016 she had a colonoscopy done by Dr. Cruz and more recently, in 2017, she had an EGD.  Both failed to demonstrate obvious source of bleeding.           Chief Complaint   2018 10:21 CDT      normocytic anemia        Interval History   Pt here for follow up.  She feels well and denies new complaints.  She is happy to report that she started exercising three times a week at a local physical therapy office.  She remains on oral iron but again reports only taking it once daily instead of twice.       Review of Systems   Constitutional:  Fatigue, Decreased activity, No fever, No chills, No weight loss.    Eye:  Negative.    Ear/Nose/Mouth/Throat:  Negative.    Respiratory:  Negative.    Cardiovascular:  Peripheral edema (chronic LE edema), No chest pain.    Gastrointestinal:  Negative.    Genitourinary:  Negative.    Hematology/Lymphatics:  Negative.    Immunologic:  Negative.    Integumentary:   Negative.    Neurologic:  Negative.    Psychiatric:  Negative.       Health Status   Allergies:    Allergic Reactions (Selected)  Severity Not Documented  Avocado- ..  Morphine- No reactions were documented.,    Allergies (2) Active Reaction  Avocado  morphine None Documented     Current medications:  (Selected)   Documented Medications  Documented  COLCRYS 0.6MG TAB:   GABAPENTIN   CAP 300M mg = 1 cap(s), Oral, TID  GLIMEPIRIDE 2MG TAB: 2 mg = 1 tab(s), Oral, Daily  GLIMEPIRIDE 4MG TAB: 4 mg = 1 tab(s), Oral, Daily  HYDROCO/APAP TAB 5-325MG:   Lasix 40 mg oral tablet: 40 mg = 1 tab(s), Oral, Daily, # 30 tab(s), 0 Refill(s)  Lipitor 40 mg oral tablet: 40 mg = 1 tab(s), Oral, At Bedtime, # 30 tab(s), 0 Refill(s)  PIOGLITAZONE 30MG TAB: 30 mg = 1 tab(s), Oral, Daily  Prilosec 40 mg oral DR capsule: 40 mg = 1 cap(s), Oral, Daily, 0 Refill(s)  VALACYCLOVIR TAB 1GM: 1 gm = 1 tab(s), Oral, TID  VITAMIN D 71674UP CAP: 38418 International unit = 1 cap(s), Oral, qWeek  Vitamin D 1,000 Units Tab: = 1 tab(s), Oral, Daily, # 30 tab(s), 0 Refill(s)  allopurinol 100 mg oral tablet: 100 mg = 1 tab(s), Oral, Daily, # 60 tab(s), 0 Refill(s)  aspirin 81 mg oral tablet: 81 mg = 1 tab(s), Oral, Daily, # 30 tab(s), 0 Refill(s)  ferrous gluconate 240 mg oral tablet: 1 tab, Oral, Daily, 0 Refill(s)  glimepiride 1 mg oral tablet: 1 mg = 1 tab(s), Oral, BID, # 30 tab(s), 0 Refill(s)  lisinopril 10 mg oral tablet: 10 mg = 1 tab(s), Oral, Daily, # 30 tab(s), 0 Refill(s)  omega-3 polyunsaturated fatty acids 1200 mg oral capsule: 3,600 mg = 3 cap(s), Oral, BID, # 100 cap(s), 0 Refill(s)  pioglitazone 15 mg oral tablet: 2 tab, Oral, Daily, # 30 tab(s), 0 Refill(s)  sertraline 100 mg oral tablet: 150 mg, Oral, Daily, # 30 tab(s), 0 Refill(s)   Problem list:    All Problems  Anemia / SNOMED CT 307040215 / Confirmed  Chronic kidney disease / SNOMED CT 1671409239 / Confirmed  DM (diabetes mellitus) / SNOMED CT  [id number] / Confirmed  HTN (hypertension) / SNOMED CT [id number] / Confirmed  Hyperlipidemia / SNOMED CT 68509598 / Confirmed  Iron deficiency / SNOMED CT 82230912 / Confirmed  Obesity / SNOMED CT 5291463851 / Confirmed,    Active Problems (7)  Anemia   Chronic kidney disease   DM (diabetes mellitus)   HTN (hypertension)   Hyperlipidemia   Iron deficiency   Obesity         Histories   Past Medical History:    No active or resolved past medical history items have been selected or recorded., HTN  DM  Gout  High cholesterol  H/o CVA  Anemia  Obesity   Family History:    No family history items have been selected or recorded., Mom  at age 83 from dementia  Dad  from unknown cause   Procedure history:    Esophagogastroduodenoscopy on 2017 at 61 Years.  Comments:  2017 11:05 - Waqas Pierson RN  auto-populated from documented surgical case  Colonoscopy on 10/17/2016 at 61 Years.  Comments:  10/17/2016 09:13 - Luc Kenny CRNA  auto-populated from documented surgical case  Appendectomy; (10121).  Cholecystectomy; (10181).  colonoscopy.   Social History        Social & Psychosocial Habits    Alcohol  2017  Use: Never    Employment/School  10/12/2016  Status: Unemployed    Home/Environment  10/12/2016  Lives with: Children    Nutrition/Health  2017  Type of diet: Regular    Substance Abuse  2017  Use: Never    Tobacco  10/12/2016  Use: Never smoker  .        Physical Examination   Vital Signs   2018 10:21 CDT      Temperature Oral          36.7 DegC                             Temperature Oral (calculated)             98.06 DegF                             Peripheral Pulse Rate     88 bpm                             Respiratory Rate          20 br/min                             SpO2                      97 %                             Systolic Blood Pressure   125 mmHg                             Diastolic  Blood Pressure  73 mmHg     Measurements from flowsheet : Measurements   6/27/2018 10:21 CDT      Weight Dosing             163 kg                             Weight Measured           163 kg                             Weight Measured and Calculated in Lbs     359.35 lb                             Height/Length Dosing      170.1 cm                             Height/Length Measured    170.1 cm                             BSA Measured              2.78 m2                             Body Mass Index Measured  56.34 kg/m2     General:  Alert and oriented, No acute distress.    Eye:  Extraocular movements are intact, Normal conjunctiva.    HENT:  Normocephalic, Oral mucosa is moist.    Neck:  Supple, Non-tender, No lymphadenopathy.    Respiratory:  Lungs are clear to auscultation, Respirations are non-labored, Breath sounds are equal.    Cardiovascular:  Normal rate, Regular rhythm.         Edema (trace): Bilateral, Lower extremity.    Gastrointestinal:  Soft, Non-tender, Non-distended, Normal bowel sounds.         Abdomen: Obese.    Genitourinary:  No costovertebral angle tenderness.    Musculoskeletal:  Normal range of motion, Normal strength, No deformity, Normal gait.    Integumentary:  Warm, Dry, Pink.    Neurologic:  Alert, Oriented, No focal deficits, Cranial Nerves II-XII are grossly intact.    Cognition and Speech:  Oriented, Speech clear and coherent.    Psychiatric:  Cooperative, Appropriate mood & affect.    ECOG Performance Scale: 1- Strenuous physical activity restricted; fully ambulatory and able to carry out light work.      Review / Management   Results review:  Lab results   6/27/2018 8:18 CDT       Sodium Lvl                142 mmol/L                             Potassium Lvl             3.6 mmol/L                             Chloride                  106 mmol/L                             CO2                       29 mmol/L                             Calcium Lvl               8.6 mg/dL                              Glucose Lvl               95 mg/dL                             BUN                       22 mg/dL  HI                             Creatinine                1.07 mg/dL                             eGFR-AA                   >60 mL/min/1.73 m2  NA                             eGFR-HOLA                  55 mL/min/1.73 m2  NA                             Bili Total                0.5 mg/dL                             Bili Direct               0.10 mg/dL                             Bili Indirect             0.40 mg/dL  NA                             AST                       14 unit/L  LOW                             ALT                       12 unit/L                             Alk Phos                  122 unit/L  HI                             Total Protein             6.5 gm/dL                             Albumin Lvl               2.7 gm/dL  LOW                             Globulin                  3.80 gm/dL  NA                             A/G Ratio                 0.7 ratio  NA                             Iron Lvl                  37 mcg/dL  LOW                             TIBC                      246 mcg/dL  LOW                             Iron Sat                  15 %                             Ferritin Lvl              87 ng/mL                             UIBC                      209 mcg/dL                             WBC                       5.7 x10(3)/mcL                             RBC                       3.87 x10(6)/mcL  LOW                             Hgb                       10.4 gm/dL  LOW                             Hct                       34.7 %  LOW                             Platelet                  163 x10(3)/mcL                             MCV                       90 fL                             MCH                       27 pg                             MCHC                      30 gm/dL  LOW                             RDW                       16.0 %                              MPV                       12.8 fL  HI                             Abs Neut                  3.5 x10(3)/mcL                             Neutro Auto               61 %                             Lymph Auto                28.90 %                             Mono Auto                 6 %                             Eos Auto                  3 %                             Abs Eos                   0.2 x10(3)/mcL                             Basophil Auto             1 %                             Abs Neutro                3.5 x10(3)/mcL                             Abs Lymph                 1.6 x10(3)/mcL                             Abs Mono                  0.4 x10(3)/mcL                             Abs Baso                  0.0 x10(3)/mcL  .       Impression and Plan   Chronic, normocytic anemia.  Hgb stable at 10.4 with MCV of 90.  Previous workup re etiology was relatively unremarkable except for mildly low iron and %.  She was started on oral iron and labs remain relatively stable.  Recommend she continue it.  Will continue to follow counts closely.     PLAN:   Continue po iron  CBC, iron studies q 3 months  RTC for MD visit in 6 months    All of her questions were answered.  Instructed her to call for sooner appt should she develop concerning signs or symptoms.      Padma Rainey MD

## 2022-04-29 NOTE — OP NOTE
Patient:   Vi Sultana             MRN: [id number]            FIN: [id number]               Age:   61 years     Sex:  Female     :  1955   Associated Diagnoses:   None   Author:   Simin Cruz MD      Operative Note   Operative Information   Date/ Time:  2017 07:53:00.     Procedures Performed: EGD with biopsy.     Preoperative Diagnosis: Anemia.     Postoperative Diagnosis: 1.  Hiatal hernia, 2-3 cm .   2.  Wide-open Schatzki's ring.  Not dilated  3.  2 polyps, multiple, gastric body and fundus.  Multiple biopsies done, otherwise normal exam.     Surgeon: Simin Cruz MD.     Assistant: Graham White Boudreaux, M.     Anesthesia: Per anesthesia service.     Speciman Removed: Yes.     Esimated blood loss: loss  3  cc.     Description of Procedure/Findings/    Complications: History and physical as per preoperative note.  Informed consent was obtained.  Patient was placed in left lateral position.  Sedation per anesthesia service.  Olympus video gastroscope was introduced into the oral cavity and esophagus was intubated under direct visualization.  Scope was carefully advanced to distal duodenum.  Patient tolerated procedure well without any complications.     Findings: Esophagus: Normal mucosa.  GE junction at approximately 39 cm.  There was a 2-3 cm hiatal hernia.  There was a wide open Schatzki's ring which was not dilated during this procedure.  Stomach: Multiple polyps ranging in size from 3-6 mm noted in gastric body as well as fundus.  Multiple biopsies were done.  These polyps were smooth surface.  These polyps were suggestive of fundic gland polyps.  Need to rule out adenomatous changes.  Antrum was unremarkable.  Duodenum: Duodenal bulb, 2nd and 3rd portion of the duodenum appeared unremarkable to the extent of exam..     Complications: None.     Recommendations:  1.  Follow biopsy results.  If adenomatous, patient will need repeat EGD for snare polypectomies etc.  2.   Consider hematology follow-up to evaluate normocytic anemia.  I will defer to patient's primary care physician.    etienne.c.: Dr. Jcarlos Montana.

## 2022-04-29 NOTE — OP NOTE
Patient:   Vi Sultana             MRN: [id number]            FIN: [id number]               Age:   66 years     Sex:  Female     :  1955   Associated Diagnoses:   None   Author:   Simin Cruz MD      Operative Note   Operative Information   Date/ Time:  2022 10:50:00.     Procedures Performed: Colonoscopy with snare polypectomy.     Preoperative Diagnosis: History of colon polyps.     Postoperative Diagnosis: 1.  Multiple colon polyps (ascending colon, transverse colon and rectum).  Snare polypectomy was done.  2.  Scattered diverticula, left colon.  3.  Lipomatous ileocecal valve, biopsies were obtained.  4.  Internal hemorrhoids, grade 1-2.  Patient was suboptimal..     Surgeon: Simin Cruz MD.     Assistant: GI staff.     Anesthesia: per anaesthesia service.     Speciman Removed: Yes.     Esimated blood loss: loss  2  cc.     Description of Procedure/Findings/    Complications: History and physical as per pre-operative note. Informed consent was obtained. Patient was placed in left lateral position. Sedation per anaesthesia service. Rectal exam was unremarkable. Olympus video colonoscope was introduced into the rectum and was carefully advanced to the cecum. The quality of the preparation was fair to poor on initial introduction of the scope.  There was fair visualization after irrigation. Patient tolerated the procedure well without any complications..     Findings: There was a 9-10 mm polyp noted in proximal ascending colon.  Hot snare polypectomy was done in a piecemeal manner.  Polyp was at somewhat difficult location.  There was good hemostasis.  There was another 6-7 mm polyp noted in distal ascending colon.  Hot snare polypectomy was done.  There was a 4-5 mm polyp noted in transverse colon.  Hot snare polypectomy was done.  Ileocecal valve appeared to be lipomatous.  Biopsies were obtained.  Terminal ileum was intubated during procedure and appeared to be unremarkable.  Periodic  irrigation was done throughout the procedure for better visualization.  There was fair visualization after irrigation.  There were scattered diverticula noted in left colon.  There was a 3-4 mm polyp noted in the rectum.  Hot snare polypectomy was done.  There were grade 1-2 internal hemorrhoids noted on withdrawal of the scope.  Estimated withdrawal time from cecum to rectum was 22 minutes and 11 seconds..     Complications: None.     Recommendations:  1.  High-fiber diet.    2.  Avoid NSAIDs for 2 weeks.  3.  Patient to follow with me as scheduled.    4.  Follow biopsy results.  5.  Patient will need repeat colonoscopy in 6-12 months pending biopsy results..

## 2022-05-03 NOTE — HISTORICAL [facility] CERNER
This is a historical note converted from iNlo. Formatting and pictures may have been removed.  Please reference Nilo for original formatting and attached multimedia. Admit and Discharge Dates  Admit Date: 09/06/2019  Discharge Date: 09/17/2019  Physicians  Attending Physician - Max CHAN, Teresa POTTS  Admitting Physician - Max CHAN, Teresa POTTS  Primary Care Physician - Kelsy CHAN, Flynn  Primary Care Physician - Max CHAN, Teresa POTTS  Discharge Diagnosis  1.?Anemia, iron deficiency?D50.9  2.?Chronic kidney disease?N18.9  3.?DM (diabetes mellitus)?E11.9  4.?Bacterial infection due to Streptococcus, group C?A49.1  5.?Diabetes mellitus with complication?E11.8  6.?Impaired mobility?Z74.09  7.?Morbid obesity?E66.01  8.?Major depressive disorder?F32.9  Moderate protein-calorie malnutrition?E44.0  Surgical Procedures  No procedures recorded for this visit.  Immunizations  No immunizations recorded for this visit.  Hospital Course  This is a 63-year-old white female who was admitted to the hospital?at [facility] initially in septic shock. ?She was found to have group C strep bacteremia?her source was?bilateral lower extremity bullous impetigo. ?She was started on IV antibiotics.? She was transitioned to penicillin which the organism was susceptible to. ?She was continued on a total of 14 days of this therapy?per IDSA guidelines.? She?also has a history of morbid obesity and is?limited in mobility.? She was mobilized with physical therapy. ?Over the course of her stay, she was able to increase her ambulation and can now ambulate down the kirk?and back to her room. ?This was a big improvement?from before she could hardly walk?to the bathroom.? She will be discharged home with?home health with physical therapy to continue and home exercises to continue.? She has completed her antibiotics and does not need to continue any antibiotics as an outpatient. ?She can follow-up in my office in 1 week.? Continue?other  medications for her chronic?illnesses.  Time Spent on discharge  30 mins  Objective  Vitals & Measurements  GENERAL:? In no apparent distress.? Morbidly?obese  HEAD:? No signs of head trauma.  EYES:? Pupils are equal.? Extraocular motions intact.?  EARS:? Hearing grossly intact.  MOUTH:? Oropharynx is normal.  NECK:? No adenopathy, no JVD.??  CHEST:? Chest with clear breath sounds bilaterally.? No wheezes, rales, or rhonchi.?  CARDIAC:? Regular rate and rhythm.? S1 and S2, without murmurs, gallops, or rubs.  VASCULAR:? No Edema.? Peripheral pulses normal and equal in all extremities.  ABDOMEN:?Morbidly obese abdomen with?erythematous?intertriginous area consistent with Candida?nystatin powder applied  MUSCULOSKELETAL:? Good range of motion of all major joints. Extremities without clubbing, cyanosis or edema.?  NEUROLOGIC EXAM:? Alert and oriented x 3.? No focal sensory or strength deficits.?? Speech normal.? Follows commands.  PSYCHIATRIC:? Mood normal.  SKIN:?Bilateral lower extremities with dry crusted lesions to b/l shins [1]  [1]  T:?36.3? ?C (Oral)? TMIN:?36.3? ?C (Oral)? TMAX:?36.8? ?C (Oral)? HR:?83(Peripheral)? RR:?20? BP:?130/72? SpO2:?93%?  Patient Discharge Condition  Stable  Discharge Disposition  Discharge home with home health and physical therapy   Discharge Medication Reconciliation  Continue  allopurinol (allopurinol 100 mg oral tablet)?100 mg, Oral, Daily  aripiprazole (aripiprazole 10 mg oral tablet)?10 mg, Oral, Daily  aspirin (aspirin 81 mg oral tablet)?81 mg, Oral, Daily  atorvastatin (Lipitor 40 mg oral tablet)?40 mg, Oral, At Bedtime  ferrous gluconate (ferrous gluconate 324 mg (38 mg elemental iron) oral tablet)  ferrous sulfate (ferrous sulfate 325 mg (65 mg elemental iron) oral enteric coated tablet)?325 mg, Oral, BID  furosemide (Lasix 40 mg oral tablet)?40 mg, Oral, Daily  lisinopril (lisinopril 10 mg oral tablet)?10 mg, Oral, Daily  pantoprazole (Pantoprazole 40 mg ORAL EC-Tablet)?40  mg, Oral, Daily  sertraline (sertraline 100 mg oral tablet)?150 mg, Oral, Daily  Discontinue  acetaminophen (acetaminophen 650 mg oral tablet, extended release)?1,300 mg, Oral, q8hr, PRN as needed for fever  enoxaparin (Lovenox)?40 mg, Subcutaneous, Daily  penicillin G potassium (penicillin g potassium 5 million units/ mL)?3 millionunits, IV, q4hr  Follow up  Teresa Santana, within 1 days, on 09/24/2019     [1]?Progress/SOAP Note; Max CHAN, Teresa OPTTS 09/12/2019 13:11 CDT

## 2022-05-03 NOTE — HISTORICAL [facility] CERNER
This is a historical note converted from Nilo. Formatting and pictures may have been removed.  Please reference Nilo for original formatting and attached multimedia. Chief Complaint  here for prolonged antibiotics for strep bacteremia.  History of Present Illness  doing well, working with PT.? tolerating the antibiotics.? She is building endurance.  Review of Systems  Comprehensive Review of Systems performed with no exceptions other than as noted in HPI.  ?  ?  Physical Exam  Vitals & Measurements  T:?36.8? ?C (Oral)? HR:?95(Monitored)? RR:?20? BP:?163/87? SpO2:?95%?  VITAL SIGNS: ?Reviewed. ? ?  GENERAL:? In no apparent distress.? Morbidly?obese  HEAD:? No signs of head trauma.  EYES:? Pupils are equal.? Extraocular motions intact.?  EARS:? Hearing grossly intact.  MOUTH:? Oropharynx is normal.  NECK:? No adenopathy, no JVD.??  CHEST:? Chest with clear breath sounds bilaterally.? No wheezes, rales, or rhonchi.?  CARDIAC:? Regular rate and rhythm.? S1 and S2, without murmurs, gallops, or rubs.  VASCULAR:? No Edema.? Peripheral pulses normal and equal in all extremities.  ABDOMEN:?Morbidly obese abdomen with?erythematous?intertriginous area consistent with Candida?nystatin powder applied  MUSCULOSKELETAL:? Good range of motion of all major joints. Extremities without clubbing, cyanosis or edema.?  NEUROLOGIC EXAM:? Alert and oriented x 3.? No focal sensory or strength deficits.?? Speech normal.? Follows commands.  PSYCHIATRIC:? Mood normal.  SKIN:?Bilateral lower extremities with?erythematous papules and?ruptured bullae with?honey colored crusts  [1]  Assessment/Plan  1.?Anemia, iron deficiency?D50.9  ?secondary to chronic disease  2.?Chronic kidney disease?N18.9  3.?DM (diabetes mellitus)?E11.9  ?continue insulin sliding scale and diabetes medications  4.?Bacterial infection due to Streptococcus, group C?A49.1  ?continue antibiotics  5.?Diabetes mellitus with complication?E11.8  6.?Impaired  mobility?Z74.09  ?continue PT  7.?Morbid obesity?E66.01  ?continue with nutrition recs  ?On SCDs for DVT ppx   Problem List/Past Medical History  Ongoing  Anemia  Anemia, iron deficiency  Chronic kidney disease  DM (diabetes mellitus)  HTN (hypertension)  Hyperlipidemia  Iron deficiency  Obesity  Historical  No qualifying data  Procedure/Surgical History  Esophagogastroduodenoscopy (06/12/2017)  Esophagogastroduodenoscopy, flexible, transoral; with biopsy, single or multiple (06/12/2017)  Excision of Stomach, Via Natural or Artificial Opening Endoscopic, Diagnostic (06/12/2017)  Colonoscopy (10/17/2016)  Colonoscopy, flexible; with removal of tumor(s), polyp(s), or other lesion(s) by snare technique (10/17/2016)  Excision of Ascending Colon, Via Natural or Artificial Opening Endoscopic (10/17/2016)  Excision of Transverse Colon, Via Natural or Artificial Opening Endoscopic (10/17/2016)  Appendectomy;  Cholecystectomy;  colonoscopy  Colonoscopy, flexible; with removal of tumor(s), polyp(s), or other lesion(s) by snare technique   Medications  Inpatient  acetaminophen, 650 mg= 20.31 mL, Oral, q8hr, PRN  allopurinol, 100 mg= 1 tab(s), Oral, Daily  ARIPiprazole 5 mg oral tablet, 10 mg= 2 tab(s), Oral, Daily  aspirin 81 mg oral Delayed Release (EC) tablet, 81 mg= 1 tab(s), Oral, Daily  Dextrose 50% and Water (50 mL vial/syringe), 6.25 gm= 12.5 mL, IV Push, As Directed, PRN  ferrous sulfate 325 mg (65 mg elemental iron) oral enteric coated tablet, 325 mg= 1 tab(s), Oral, BID  glucagon, 1 mg= 1 EA, Subcutaneous, As Directed, PRN  insulin lispro, 3-21 units, Subcutaneous, As Directed, PRN  insulin lispro, 2-10 units based on sliding scale, Subcutaneous, As Directed, PRN  Lasix, 40 mg= 1 tab(s), Oral, Daily  Lipitor, 40 mg= 1 tab(s), Oral, At Bedtime  lisinopril 10 mg oral tablet, 10 mg= 1 tab(s), Oral, Daily  Lovenox, 40 mg= 0.4 mL, Subcutaneous, Daily  pantoprazole, 40 mg= 1 tab(s), Oral, Daily  Pfizerpen 5,000,000 units  injection  Rocephin (for IVPB)  sertraline, 150 mg= 3 tab(s), Oral, Daily  Sodium Chloride 0.9% PF vial (For PICC Flush), 10 mL, IV Push, As Directed, PRN  Sodium Chloride 0.9% PF vial (For PICC Flush), 20 mL, IV Push, As Directed, PRN  Sodium Chloride 0.9% PF vial (For PICC Flush), 10 mL, IV Push, q12hr  Home  acetaminophen 650 mg oral tablet, extended release, 1300 mg= 2 tab(s), Oral, q8hr, PRN  allopurinol 100 mg oral tablet, 100 mg= 1 tab(s), Oral, Daily  aripiprazole 10 mg oral tablet, 10 mg= 1 tab(s), Oral, Daily  aspirin 81 mg oral tablet, 81 mg= 1 tab(s), Oral, Daily  ferrous gluconate 324 mg (38 mg elemental iron) oral tablet  ferrous sulfate 325 mg (65 mg elemental iron) oral enteric coated tablet, 325 mg= 1 tab(s), Oral, BID, 5 refills  Lasix 40 mg oral tablet, 40 mg= 1 tab(s), Oral, Daily  Lipitor 40 mg oral tablet, 40 mg= 1 tab(s), Oral, At Bedtime  lisinopril 10 mg oral tablet, 10 mg= 1 tab(s), Oral, Daily  Lovenox, 40 mg= 0.4 mL, Subcutaneous, Daily  Pantoprazole 40 mg ORAL EC-Tablet, 40 mg= 1 tab(s), Oral, Daily  penicillin g potassium 5 million units/ mL, 3 millionunits, IV, q4hr  sertraline 100 mg oral tablet, 150 mg, Oral, Daily  Allergies  Avocado?(.)  morphine  Social History  Abuse/Neglect  No, No, Yes, 09/06/2019  No, No, Yes, 09/06/2019  Alcohol  Never, 08/17/2017  Employment/School  Unemployed, 10/12/2016  Exercise  Exercise duration: 0. Exercise type: Walking., 09/06/2019  Home/Environment  Lives with Children., 10/12/2016  Nutrition/Health  Regular, 06/07/2017  Sexual  Sexually active: No. No, 09/06/2019  Spiritual/Cultural  Religion, Yes, 09/06/2019  Substance Use  Never, 08/17/2017  Tobacco  Never (less than 100 in lifetime), No, Household tobacco concerns: No. Smokeless Tobacco Use: Never., 09/06/2019  Never (less than 100 in lifetime), N/A, 09/04/2019  Never (less than 100 in lifetime), N/A, 08/26/2019  Never smoker, 10/12/2016     [1]?Admission H & P; Max CHAN, Teresa POTTS  09/06/2019 17:25 CDT

## 2022-05-03 NOTE — HISTORICAL [facility] CERNER
This is a historical note converted from Nilo. Formatting and pictures may have been removed.  Please reference Nilo for original formatting and attached multimedia. Chief Complaint  admitted for continuation of IV antibiotics  History of Present Illness  This is a 63-year-old white female who is well-known to me. ?The patient has a history of morbid obesity and diabetes. ?She was initially?admitted to [facility] with?sepsis with endorgan dysfunction, MYLA. ?The?MYLA resolved with IV fluid resuscitation. ?Her?blood cultures grew out?group C strep.? Source is likely her lower extremities which had extensive?bullous impetigo.? She also has a severe?rash?under her pannus.? Her hospital course prior to admission for antibiotics to this facility was?uncomplicated. ?She had an echocardiogram which did not?demonstrate any valvular vegetations. ?She is right atrial enlargement.? She is?chronically debilitated?and deconditioned?due to her sedentary lifestyle.? She?also has an albumin of 1,7  Review of Systems  Comprehensive Review of Systems performed with no exceptions other than as noted in HPI.  ?  ?  Physical Exam  Vitals & Measurements  T:?36.5? ?C (Oral)? HR:?78(Peripheral)? RR:?20? BP:?174/71? SpO2:?97%? WT:?157?kg? BMI:?53.07?  VITAL SIGNS: ?Reviewed. ? ?  GENERAL:? In no apparent distress.? Morbidly?obese  HEAD:? No signs of head trauma.  EYES:? Pupils are equal.? Extraocular motions intact.?  EARS:? Hearing grossly intact.  MOUTH:? Oropharynx is normal.  NECK:? No adenopathy, no JVD.??  CHEST:? Chest with clear breath sounds bilaterally.? No wheezes, rales, or rhonchi.?  CARDIAC:? Regular rate and rhythm.? S1 and S2, without murmurs, gallops, or rubs.  VASCULAR:? No Edema.? Peripheral pulses normal and equal in all extremities.  ABDOMEN:?Morbidly obese abdomen with?erythematous?intertriginous area consistent with Candida?nystatin powder applied  MUSCULOSKELETAL:? Good range of motion of all major  joints. Extremities without clubbing, cyanosis or edema.?  NEUROLOGIC EXAM:? Alert and oriented x 3.? No focal sensory or strength deficits.?? Speech normal.? Follows commands.  PSYCHIATRIC:? Mood normal.  SKIN:?Bilateral lower extremities with?erythematous papules and?ruptured bullae with?honey colored crusts [1]  Assessment/Plan  1.?Anemia, iron deficiency?D50.9  ?stable, chronic continue iron replacement  Ordered:  CBC w/ Auto Diff, Routine collect, 09/07/19 5:00:00 CDT, Blood, Stop date 09/07/19 5:00:00 CDT, Lab Collect, Streptococcal bacteremia  Anemia  Bullous impetigo  Anemia, iron deficiency  UTI symptoms, 09/07/19 5:00:00 CDT  Comprehensive Metabolic Panel, Routine collect, 09/07/19 5:00:00 CDT, Blood, Stop date 09/07/19 5:00:00 CDT, Lab Collect, in AM, Streptococcal bacteremia  Anemia  Bullous impetigo  Anemia, iron deficiency  UTI symptoms, 09/07/19 5:00:00 CDT  Magnesium Level, Routine collect, 09/07/19 5:00:00 CDT, Blood, Stop date 09/07/19 5:00:00 CDT, Lab Collect, Streptococcal bacteremia  Anemia  Bullous impetigo  Anemia, iron deficiency  UTI symptoms, 09/07/19 5:00:00 CDT  Phosphorus Level, Routine collect, 09/07/19 5:00:00 CDT, Blood, Stop date 09/07/19 5:00:00 CDT, Lab Collect, Streptococcal bacteremia  Anemia  Bullous impetigo  Anemia, iron deficiency  UTI symptoms, 09/07/19 5:00:00 CDT  ?  2.?Chronic kidney disease?N18.9  ?stable  ?  3.?DM (diabetes mellitus)?E11.9  ?insulin sliding scale  ?  4.?Bacterial infection due to Streptococcus, group C?A49.1  ?continue pcn g IV for total of 14 days, toay is day 4 of IV abx  ?  5.?Diabetes mellitus with complication?E11.8  ?  6.?Impaired mobility?Z74.09  ?PT eval and treat  ?  7.?Morbid obesity?E66.01  ?nutrition consult  morbidly obese thouh albumin 1.7 therefore meeting criteria for malnutrition  ?  Orders:  acetaminophen, 1,300 mg = 2 tab(s), Oral, q8hr, PRN PRN as needed for fever, # 100 tab(s), 0 Refill(s)  enoxaparin, 40 mg = 0.4  mL, Subcutaneous, Daily, 0 Refill(s)  pantoprazole, 40 mg, form: Tab-EC, Oral, Daily, first dose 09/07/19 6:00:00 CDT  pantoprazole, 40 mg = 1 tab(s), Oral, Daily, 0 Refill(s)  penicillin G potassium, 3 millionunits, IV, q4hr, 0 Refill(s)  Admit as Long Term Acute Care Inpatient, 09/06/19 15:36:00 CDT  Admit as Long Term Acute Care Inpatient, 09/06/19 15:44:00 CDT  Basic Metabolic Panel, AM Next collect, 09/07/19 5:00:00 CDT, Blood, Once, Stop date 09/07/19 5:00:00 CDT, Lab Collect, in AM, 09/07/19 5:00:00 CDT  Below the Knee Graduated Compression Stocking, 09/06/19 15:36:00 CDT, Constant Order  Below the Knee Graduated Compression Stocking, 09/06/19 15:44:00 CDT, Constant Order  Blood Glucose Monitoring POC, 09/06/19 17:01:00 CDT, AC & HS, per sliding scale, 09/06/19 21:00:00 CDT  Consult Case Management, 09/06/19 15:44:00 CDT, initiate discharge planning  Consult Case Management, 09/06/19 15:36:00 CDT, initiate discharge planning  Consult to Nutritionist Adult, 09/06/19 15:44:00 CDT, nutritional assessment  Consult to Nutritionist Adult, 09/06/19 15:36:00 CDT, nutritional assessment  Consult to Respiratory Therapy, 09/06/19 15:39:00 CDT, O2 @ 2L  Consult Wound Care Nurse, 09/06/19 15:44:00 CDT, skin assessment  Consult Wound Care Nurse, 09/06/19 15:36:00 CDT, skin assessment  Discharge, 09/06/19 13:41:00 CDT, DC/Trans to [facility]  Low Sodium Meal Plan, 09/06/19 15:44:00 CDT, Start Meal: Next Meal  Patient Isolation, 09/06/19 15:44:00 CDT, Standard Precautions, CM Isolation, Constant Indicator  Patient Isolation, 09/06/19 15:36:00 CDT, Standard Precautions, CM Isolation, Constant Indicator  PT Evaluation Only, 09/06/19 15:36:00 CDT, Mobility, Patient has IV, Standard Precautions  PT Evaluation Only, 09/06/19 15:44:00 CDT, Mobility, Patient has IV, Standard Precautions  Remove Compression Device, Inspect skin, Reapply, and Document Assessment, 09/06/19 15:44:00 CDT, qShift  Resuscitation Status,  09/06/19 15:44:00 CDT, Full Resuscitation  Resuscitation Status, 09/06/19 15:36:00 CDT, Full Resuscitation  Up ad Lindsey, 09/06/19 15:36:00 CDT, Constant Order, with assistance ONLY  Up ad Lindsey, 09/06/19 15:44:00 CDT, Constant Order, with assistance ONLY  Vital Signs, 09/06/19 15:36:00 CDT, q8hr  Vital Signs, 09/06/19 15:44:00 CDT, q8hr  Weight, 09/06/19 15:36:00 CDT, qWeek, Upon admission  Weight, 09/06/19 15:44:00 CDT, qWeek, Upon admission   Problem List/Past Medical History  Ongoing  Anemia  Anemia, iron deficiency  Chronic kidney disease  DM (diabetes mellitus)  HTN (hypertension)  Hyperlipidemia  Iron deficiency  Obesity  Historical  No qualifying data  Procedure/Surgical History  Esophagogastroduodenoscopy (06/12/2017)  Esophagogastroduodenoscopy, flexible, transoral; with biopsy, single or multiple (06/12/2017)  Excision of Stomach, Via Natural or Artificial Opening Endoscopic, Diagnostic (06/12/2017)  Colonoscopy (10/17/2016)  Colonoscopy, flexible; with removal of tumor(s), polyp(s), or other lesion(s) by snare technique (10/17/2016)  Excision of Ascending Colon, Via Natural or Artificial Opening Endoscopic (10/17/2016)  Excision of Transverse Colon, Via Natural or Artificial Opening Endoscopic (10/17/2016)  Appendectomy;  Cholecystectomy;  colonoscopy  Colonoscopy, flexible; with removal of tumor(s), polyp(s), or other lesion(s) by snare technique   Medications  Inpatient  acetaminophen, 650 mg= 20.3 mL, Oral, q6hr, PRN  allopurinol 100 mg oral tablet, 100 mg= 1 tab(s), Oral, Daily  ARIPiprazole 10 mg oral tablet, 10 mg= 1 tab(s), Oral, Daily  aspirin 81 mg oral tablet, CHEWABLE, 81 mg= 1 tab(s), Oral, Daily  Dextrose 50% and Water (50 mL vial/syringe), 12.5 gm= 25 mL, IV Push, Once, PRN  Dextrose 50% and Water (50 mL vial/syringe), 12.5 gm= 25 mL, IV Push, As Directed, PRN  Dextrose 50% and Water (50 mL vial/syringe), 25 gm= 50 mL, IV Push, As Directed  Dextrose 50% in Water intravenous solution, 12.5 gm= 25  mL, IV Push, As Directed, PRN  ferrous gluconate 324 mg oral tablet, 324 mg= 1 tab(s), Oral, Daily  glucagon, 1 mg= 1 EA, IM, q10min, PRN  glucagon, 1 mg= 1 EA, IM, q10min, PRN  insulin lispro, 3-21 units, Subcutaneous, As Directed, PRN  K-Dur 20 oral tablet, extended release, 20 mEq= 1 tab(s), Oral, BID  Lipitor 40 mg oral tablet, 40 mg= 1 tab(s), Oral, At Bedtime  lisinopril 10 mg oral tablet, 10 mg= 1 tab(s), Oral, Daily  Lovenox, 40 mg= 0.4 mL, Subcutaneous, Daily  nystatin 100,000 units/g topical powder, 1 jaquan, TOP, TID  penicillin g potassium 5 million units/ mL  Protonix 40 mg ORAL enteric coated tablet, 40 mg= 1 tab(s), Oral, Daily  Rocephin (for IVPB)  sertraline, 150 mg= 3 tab(s), Oral, Daily  Zofran, 4 mg= 2 mL, IV Push, q4hr, PRN  Home  acetaminophen 650 mg oral tablet, extended release, 1300 mg= 2 tab(s), Oral, q8hr, PRN  allopurinol 100 mg oral tablet, 100 mg= 1 tab(s), Oral, Daily  aripiprazole 10 mg oral tablet, 10 mg= 1 tab(s), Oral, Daily  aspirin 81 mg oral tablet, 81 mg= 1 tab(s), Oral, Daily  ferrous gluconate 324 mg (38 mg elemental iron) oral tablet  ferrous sulfate 325 mg (65 mg elemental iron) oral enteric coated tablet, 325 mg= 1 tab(s), Oral, BID, 5 refills  Lasix 40 mg oral tablet, 40 mg= 1 tab(s), Oral, Daily  Lipitor 40 mg oral tablet, 40 mg= 1 tab(s), Oral, At Bedtime  lisinopril 10 mg oral tablet, 10 mg= 1 tab(s), Oral, Daily  Lovenox, 40 mg= 0.4 mL, Subcutaneous, Daily  Pantoprazole 40 mg ORAL EC-Tablet, 40 mg= 1 tab(s), Oral, Daily  penicillin g potassium 5 million units/ mL, 3 millionunits, IV, q4hr  sertraline 100 mg oral tablet, 150 mg, Oral, Daily  Allergies  Avocado?(.)  morphine  Social History  Abuse/Neglect  No, No, Yes, 09/06/2019  No, No, Yes, 09/06/2019  Alcohol  Never, 08/17/2017  Employment/School  Unemployed, 10/12/2016  Exercise  Exercise duration: 0. Exercise type: Walking., 09/06/2019  Home/Environment  Lives with Children.,  10/12/2016  Nutrition/Health  Regular, 06/07/2017  Sexual  Sexually active: No. No, 09/06/2019  Spiritual/Cultural  Orthodox, Yes, 09/06/2019  Substance Use  Never, 08/17/2017  Tobacco  Never (less than 100 in lifetime), No, Household tobacco concerns: No. Smokeless Tobacco Use: Never., 09/06/2019  Never (less than 100 in lifetime), N/A, 09/04/2019  Never (less than 100 in lifetime), N/A, 08/26/2019  Never smoker, 10/12/2016     [1]?Progress/SOAP Note; Max CHAN, Teresa POTTS 09/05/2019 22:00 CDT

## 2023-01-17 ENCOUNTER — LAB VISIT (OUTPATIENT)
Dept: LAB | Facility: HOSPITAL | Age: 68
End: 2023-01-17
Attending: FAMILY MEDICINE
Payer: MEDICAID

## 2023-01-17 DIAGNOSIS — E11.00 TYPE II DIABETES MELLITUS WITH HYPEROSMOLARITY, UNCONTROLLED: Primary | ICD-10-CM

## 2023-01-17 DIAGNOSIS — E78.2 MIXED HYPERLIPIDEMIA: ICD-10-CM

## 2023-01-17 LAB
ALBUMIN SERPL-MCNC: 3.5 G/DL (ref 3.4–4.8)
ALBUMIN/GLOB SERPL: 1.2 RATIO (ref 1.1–2)
ALP SERPL-CCNC: 71 UNIT/L (ref 40–150)
ALT SERPL-CCNC: 17 UNIT/L (ref 0–55)
AST SERPL-CCNC: 17 UNIT/L (ref 5–34)
BASOPHILS # BLD AUTO: 0.05 X10(3)/MCL (ref 0–0.2)
BASOPHILS NFR BLD AUTO: 0.8 %
BILIRUBIN DIRECT+TOT PNL SERPL-MCNC: 0.7 MG/DL
BUN SERPL-MCNC: 24 MG/DL (ref 9.8–20.1)
CALCIUM SERPL-MCNC: 9.2 MG/DL (ref 8.4–10.2)
CHLORIDE SERPL-SCNC: 106 MMOL/L (ref 98–107)
CHOLEST SERPL-MCNC: 141 MG/DL
CHOLEST/HDLC SERPL: 5 {RATIO} (ref 0–5)
CO2 SERPL-SCNC: 25 MMOL/L (ref 23–31)
CREAT SERPL-MCNC: 1.13 MG/DL (ref 0.55–1.02)
CREAT UR-MCNC: 71.1 MG/DL (ref 47–110)
EOSINOPHIL # BLD AUTO: 0.14 X10(3)/MCL (ref 0–0.9)
EOSINOPHIL NFR BLD AUTO: 2.1 %
ERYTHROCYTE [DISTWIDTH] IN BLOOD BY AUTOMATED COUNT: 14.5 % (ref 11.5–17)
EST. AVERAGE GLUCOSE BLD GHB EST-MCNC: 142.7 MG/DL
GFR SERPLBLD CREATININE-BSD FMLA CKD-EPI: 53 MLS/MIN/1.73/M2
GLOBULIN SER-MCNC: 3 GM/DL (ref 2.4–3.5)
GLUCOSE SERPL-MCNC: 148 MG/DL (ref 82–115)
HBA1C MFR BLD: 6.6 %
HCT VFR BLD AUTO: 37.1 % (ref 37–47)
HDLC SERPL-MCNC: 31 MG/DL (ref 35–60)
HGB BLD-MCNC: 11.5 GM/DL (ref 12–16)
IMM GRANULOCYTES # BLD AUTO: 0.02 X10(3)/MCL (ref 0–0.04)
IMM GRANULOCYTES NFR BLD AUTO: 0.3 %
LDLC SERPL CALC-MCNC: 58 MG/DL (ref 50–140)
LYMPHOCYTES # BLD AUTO: 1.77 X10(3)/MCL (ref 0.6–4.6)
LYMPHOCYTES NFR BLD AUTO: 26.8 %
MCH RBC QN AUTO: 27.8 PG
MCHC RBC AUTO-ENTMCNC: 31 MG/DL (ref 33–36)
MCV RBC AUTO: 89.8 FL (ref 80–94)
MICROALBUMIN UR-MCNC: 82.8 UG/ML
MICROALBUMIN/CREAT RATIO PNL UR: 116.5 MG/GM CR (ref 0–30)
MONOCYTES # BLD AUTO: 0.32 X10(3)/MCL (ref 0.1–1.3)
MONOCYTES NFR BLD AUTO: 4.8 %
NEUTROPHILS # BLD AUTO: 4.3 X10(3)/MCL (ref 2.1–9.2)
NEUTROPHILS NFR BLD AUTO: 65.2 %
PLATELET # BLD AUTO: 170 X10(3)/MCL (ref 130–400)
PMV BLD AUTO: 11.7 FL (ref 7.4–10.4)
POTASSIUM SERPL-SCNC: 4.3 MMOL/L (ref 3.5–5.1)
PROT SERPL-MCNC: 6.5 GM/DL (ref 5.8–7.6)
RBC # BLD AUTO: 4.13 X10(6)/MCL (ref 4.2–5.4)
SODIUM SERPL-SCNC: 140 MMOL/L (ref 136–145)
TRIGL SERPL-MCNC: 261 MG/DL (ref 37–140)
VLDLC SERPL CALC-MCNC: 52 MG/DL
WBC # SPEC AUTO: 6.6 X10(3)/MCL (ref 4.5–11.5)

## 2023-01-17 PROCEDURE — 36415 COLL VENOUS BLD VENIPUNCTURE: CPT

## 2023-01-17 PROCEDURE — 80061 LIPID PANEL: CPT

## 2023-01-17 PROCEDURE — 80053 COMPREHEN METABOLIC PANEL: CPT

## 2023-01-17 PROCEDURE — 82043 UR ALBUMIN QUANTITATIVE: CPT

## 2023-01-17 PROCEDURE — 83036 HEMOGLOBIN GLYCOSYLATED A1C: CPT

## 2023-01-17 PROCEDURE — 85025 COMPLETE CBC W/AUTO DIFF WBC: CPT

## 2023-02-05 ENCOUNTER — PATIENT MESSAGE (OUTPATIENT)
Dept: ADMINISTRATIVE | Facility: OTHER | Age: 68
End: 2023-02-05
Payer: MEDICAID

## 2023-02-06 ENCOUNTER — PATIENT MESSAGE (OUTPATIENT)
Dept: ADMINISTRATIVE | Facility: OTHER | Age: 68
End: 2023-02-06
Payer: MEDICAID

## 2023-02-06 ENCOUNTER — ANESTHESIA EVENT (OUTPATIENT)
Dept: ENDOSCOPY | Facility: HOSPITAL | Age: 68
End: 2023-02-06
Payer: MEDICAID

## 2023-02-06 RX ORDER — ROSUVASTATIN CALCIUM 40 MG/1
10 TABLET, COATED ORAL NIGHTLY
COMMUNITY

## 2023-02-06 RX ORDER — FERROUS GLUCONATE 324(38)MG
324 TABLET ORAL
COMMUNITY

## 2023-02-06 RX ORDER — ASPIRIN 81 MG/1
81 TABLET ORAL DAILY
COMMUNITY

## 2023-02-06 RX ORDER — ALLOPURINOL 100 MG/1
100 TABLET ORAL DAILY
COMMUNITY

## 2023-02-06 RX ORDER — FUROSEMIDE 40 MG/1
40 TABLET ORAL 2 TIMES DAILY
COMMUNITY

## 2023-02-06 RX ORDER — SERTRALINE HYDROCHLORIDE 100 MG/1
100 TABLET, FILM COATED ORAL DAILY
COMMUNITY

## 2023-02-07 ENCOUNTER — HOSPITAL ENCOUNTER (OUTPATIENT)
Facility: HOSPITAL | Age: 68
Discharge: HOME | End: 2023-02-07
Attending: INTERNAL MEDICINE | Admitting: INTERNAL MEDICINE
Payer: MEDICAID

## 2023-02-07 ENCOUNTER — ANESTHESIA (OUTPATIENT)
Dept: ENDOSCOPY | Facility: HOSPITAL | Age: 68
End: 2023-02-07
Payer: MEDICAID

## 2023-02-07 ENCOUNTER — PATIENT MESSAGE (OUTPATIENT)
Dept: ADMINISTRATIVE | Facility: OTHER | Age: 68
End: 2023-02-07
Payer: MEDICAID

## 2023-02-07 VITALS
SYSTOLIC BLOOD PRESSURE: 137 MMHG | OXYGEN SATURATION: 99 % | HEIGHT: 68 IN | DIASTOLIC BLOOD PRESSURE: 85 MMHG | BODY MASS INDEX: 44.41 KG/M2 | TEMPERATURE: 97 F | HEART RATE: 73 BPM | RESPIRATION RATE: 16 BRPM | WEIGHT: 293 LBS

## 2023-02-07 DIAGNOSIS — D64.9 ANEMIA, UNSPECIFIED TYPE: ICD-10-CM

## 2023-02-07 DIAGNOSIS — Z86.010 PERSONAL HISTORY OF COLONIC POLYPS: ICD-10-CM

## 2023-02-07 PROBLEM — Z86.0100 PERSONAL HISTORY OF COLONIC POLYPS: Status: ACTIVE | Noted: 2023-02-07

## 2023-02-07 LAB — POCT GLUCOSE: 148 MG/DL (ref 70–110)

## 2023-02-07 PROCEDURE — 27201423 OPTIME MED/SURG SUP & DEVICES STERILE SUPPLY: Performed by: INTERNAL MEDICINE

## 2023-02-07 PROCEDURE — 37000008 HC ANESTHESIA 1ST 15 MINUTES: Performed by: INTERNAL MEDICINE

## 2023-02-07 PROCEDURE — 00811 ANES LWR INTST NDSC NOS: CPT | Performed by: INTERNAL MEDICINE

## 2023-02-07 PROCEDURE — 63600175 PHARM REV CODE 636 W HCPCS: Performed by: NURSE ANESTHETIST, CERTIFIED REGISTERED

## 2023-02-07 PROCEDURE — 45385 COLONOSCOPY W/LESION REMOVAL: CPT | Performed by: INTERNAL MEDICINE

## 2023-02-07 PROCEDURE — 37000009 HC ANESTHESIA EA ADD 15 MINS: Performed by: INTERNAL MEDICINE

## 2023-02-07 PROCEDURE — 25000003 PHARM REV CODE 250: Performed by: NURSE ANESTHETIST, CERTIFIED REGISTERED

## 2023-02-07 PROCEDURE — 63600175 PHARM REV CODE 636 W HCPCS: Performed by: ANESTHESIOLOGY

## 2023-02-07 PROCEDURE — 82962 GLUCOSE BLOOD TEST: CPT | Performed by: INTERNAL MEDICINE

## 2023-02-07 RX ORDER — PROPOFOL 10 MG/ML
VIAL (ML) INTRAVENOUS
Status: COMPLETED
Start: 2023-02-07 | End: 2023-02-07

## 2023-02-07 RX ORDER — ONDANSETRON 2 MG/ML
4 INJECTION INTRAMUSCULAR; INTRAVENOUS ONCE AS NEEDED
Status: DISCONTINUED | OUTPATIENT
Start: 2023-02-07 | End: 2023-02-07 | Stop reason: HOSPADM

## 2023-02-07 RX ORDER — LIDOCAINE HYDROCHLORIDE 10 MG/ML
1 INJECTION, SOLUTION EPIDURAL; INFILTRATION; INTRACAUDAL; PERINEURAL ONCE
Status: DISCONTINUED | OUTPATIENT
Start: 2023-02-07 | End: 2023-02-07

## 2023-02-07 RX ORDER — PROPOFOL 10 MG/ML
VIAL (ML) INTRAVENOUS
Status: DISCONTINUED | OUTPATIENT
Start: 2023-02-07 | End: 2023-02-07

## 2023-02-07 RX ORDER — LIRAGLUTIDE 6 MG/ML
0.6 INJECTION SUBCUTANEOUS DAILY
COMMUNITY

## 2023-02-07 RX ORDER — LIDOCAINE HYDROCHLORIDE 20 MG/ML
INJECTION, SOLUTION EPIDURAL; INFILTRATION; INTRACAUDAL; PERINEURAL
Status: DISCONTINUED | OUTPATIENT
Start: 2023-02-07 | End: 2023-02-07

## 2023-02-07 RX ORDER — SODIUM CHLORIDE, SODIUM LACTATE, POTASSIUM CHLORIDE, CALCIUM CHLORIDE 600; 310; 30; 20 MG/100ML; MG/100ML; MG/100ML; MG/100ML
INJECTION, SOLUTION INTRAVENOUS CONTINUOUS
Status: DISCONTINUED | OUTPATIENT
Start: 2023-02-07 | End: 2023-02-07

## 2023-02-07 RX ORDER — LIDOCAINE HYDROCHLORIDE 20 MG/ML
INJECTION, SOLUTION EPIDURAL; INFILTRATION; INTRACAUDAL; PERINEURAL
Status: COMPLETED
Start: 2023-02-07 | End: 2023-02-07

## 2023-02-07 RX ADMIN — SODIUM CHLORIDE: 9 INJECTION, SOLUTION INTRAVENOUS at 09:02

## 2023-02-07 RX ADMIN — PROPOFOL 30 MG: 10 INJECTION, EMULSION INTRAVENOUS at 09:02

## 2023-02-07 RX ADMIN — PROPOFOL 80 MG: 10 INJECTION, EMULSION INTRAVENOUS at 09:02

## 2023-02-07 RX ADMIN — PROPOFOL 30 MG: 10 INJECTION, EMULSION INTRAVENOUS at 10:02

## 2023-02-07 RX ADMIN — SODIUM CHLORIDE, POTASSIUM CHLORIDE, SODIUM LACTATE AND CALCIUM CHLORIDE: 600; 310; 30; 20 INJECTION, SOLUTION INTRAVENOUS at 09:02

## 2023-02-07 RX ADMIN — LIDOCAINE HYDROCHLORIDE 100 MG: 20 INJECTION, SOLUTION INTRAVENOUS at 09:02

## 2023-02-07 NOTE — PROCEDURES
Vi Sultana   MRN: [id number]   ADMISSION DATE: 2023  : 1955  AGE: 67 y.o.    PROCEDURE:  Colonoscopy with snare polypectomy     DATE OF PROCEDURE:  2023     SURGEON: DAKOTA NAVAS    PREOPERATIVE DIAGNOSIS:  History of colon polyps    POSTOPERATIVE DIAGNOSIS:  1. Multiple colon polyps, snare polypectomy was done.  2. Transverse and left colon diverticula.  3. Internal hemorrhoids, grade 2.    4. Limited exam due to suboptimal preparation.    HISTORY AND PHYSICAL:  As per preoperative note.      DESCRIPTION OF PROCEDURE:  Informed consent was obtained.  Patient was placed in left lateral position.  Sedation per anesthesia service.  Rectal exam was unremarkable.  Olympus video colonoscope was introduced into the rectum and was carefully advanced to cecum.  Quality of the preparation was fair to poor limiting visualization of the entire mucosa.  Some parts of the colon could not be completely visualized.  Small to medium-sized polyps could have been missed.  Patient tolerated the procedure well without any complications.    FINDINGS:  There were scattered diverticula noted in transverse and left colon.  Photo documentation was obtained.  Quality of the prep was poor in certain parts of the colon limiting visualization of the entire mucosa.  Aggressive irrigation was done throughout the procedure for better visualization.  There were 2 polyps ranging in size from 7-10 mm noted in transverse colon.  Snare polypectomy was done.  1 of the polyp had some oozing from the base before the cautery could be applied.  The base was cauterized with the tip of snare.  There was good hemostasis.  There were 2 polyps noted in the ascending colon ranging in size from 6-7 mm.  Hot snare polypectomy was done for 1 of the polyp.  Second polyp was not removed as prep was poor and was at a difficult angulation.  There were 2 additional polyps 3-4 mm noted in transverse and left colon which were not removed during this  exam.  Cecum was clearly identified by the presence of visual landmarks including appendiceal orifice, ileocecal valve and cecal strap.  Ileocecal valve appeared to be somewhat lipomatous.  Biopsies were obtained.  Terminal ileum was not intubated during the procedure.  There were grade 2 internal hemorrhoids noted on withdrawal of the scope.  Estimated withdrawal cecum to rectum was 22 minutes and 9 seconds      ESTIMATED BLOOD LOSS:  3-4 cc.        COMPLICATIONS:  None.    RECOMMENDATIONS:  1.  Follow biopsy results.    2. Repeat colonoscopy in 3-6 months with a better preparation.  Patient will need extended colon preparation for better visualization.  3. Avoid NSAIDs for 2 weeks.

## 2023-02-07 NOTE — TRANSFER OF CARE
"Anesthesia Transfer of Care Note    Patient: Vi Sultana    Procedure(s) Performed: Procedure(s) (LRB):  COLON (N/A)  COLONOSCOPY, WITH POLYPECTOMY USING SNARE  COLONOSCOPY, WITH HEMORRHAGE CONTROL  COLONOSCOPY, WITH 1 OR MORE BIOPSIES    Patient location: GI    Anesthesia Type: general    Transport from OR: Transported from OR on room air with adequate spontaneous ventilation    Post pain: adequate analgesia    Post assessment: no apparent anesthetic complications    Post vital signs: stable    Level of consciousness: awake    Nausea/Vomiting: no nausea/vomiting    Complications: none    Transfer of care protocol was followed      Last vitals:   Visit Vitals  BP (!) 152/66 (BP Location: Left arm, Patient Position: Lying)   Pulse 94   Temp 36.2 °C (97.2 °F) (Temporal)   Resp 19   Ht 5' 8" (1.727 m)   Wt (!) 137 kg (302 lb)   SpO2 100%   BMI 45.92 kg/m²     "

## 2023-02-07 NOTE — ANESTHESIA PREPROCEDURE EVALUATION
02/06/2023  Vi Sultana is a 67 y.o., female , who presents for the following:    Procedure: COLON (Abdomen)   Anesthesia type: General/MAC   Diagnosis:        Personal history of colonic polyps [Z86.010]       Anemia, unspecified type [D64.9]   Location: [facility] ENDO 03 / [facility] ENDOSCOPY   Surgeons: Simin Cruz MD       Pre-op Assessment    I have reviewed the Patient Summary Reports.     I have reviewed the Nursing Notes. I have reviewed the NPO Status.   I have reviewed the Medications.     Review of Systems  Anesthesia Hx:  No problems with previous Anesthesia  Denies Family Hx of Anesthesia complications.   Denies Personal Hx of Anesthesia complications.   Cardiovascular:   Hypertension hyperlipidemia    Pulmonary:  Pulmonary Normal    Neurological:   CVA    Endocrine:   Diabetes        Physical Exam  General: Alert and Oriented  Morbid Obesity  Airway:  Mallampati: III   Mouth Opening: Normal  TM Distance: Normal  Tongue: Large  Neck ROM: Normal ROM    Dental:  Edentulous    Chest/Lungs:  Normal Respiratory Rate    Heart:  Rate: Normal  Rhythm: Regular Rhythm       Latest Reference Range & Units 01/17/23 09:22   WBC 4.5 - 11.5 x10(3)/mcL 6.6   RBC 4.20 - 5.40 x10(6)/mcL 4.13 (L)   Hemoglobin 12.0 - 16.0 gm/dL 11.5 (L)   Hematocrit 37.0 - 47.0 % 37.1   MCV 80.0 - 94.0 fL 89.8   MCH pg 27.8   MCHC 33.0 - 36.0 mg/dL 31.0 (L)   RDW 11.5 - 17.0 % 14.5   Platelets 130 - 400 x10(3)/mcL 170   (L): Data is abnormally low     Latest Reference Range & Units 01/17/23 09:22   Sodium 136 - 145 mmol/L 140   Potassium 3.5 - 5.1 mmol/L 4.3   Chloride 98 - 107 mmol/L 106   CO2 23 - 31 mmol/L 25   BUN 9.8 - 20.1 mg/dL 24.0 (H)   Creatinine 0.55 - 1.02 mg/dL 1.13 (H)   eGFR mls/min/1.73/m2 53   Glucose 82 - 115 mg/dL 148 (H)   Calcium 8.4 - 10.2 mg/dL 9.2   (H): Data is abnormally high    Anesthesia Plan  Type  of Anesthesia, risks & benefits discussed:    Anesthesia Type: Gen Natural Airway  Intra-op Monitoring Plan: Standard ASA Monitors  Post Op Pain Control Plan: IV/PO Opioids PRN  Induction:  IV  Airway Plan: Direct  Informed Consent: Informed consent signed with the Patient and all parties understand the risks and agree with anesthesia plan.  All questions answered. Patient consented to blood products? No  ASA Score: 3  Day of Surgery Review of History & Physical: H&P Update referred to the surgeon/provider.  Anesthesia Plan Notes: Nasal cannula vs facemask supplemental oxygenation   For patients with EDGAR/obesity, may consider SuperNoval Nasal CPAP      Ready For Surgery From Anesthesia Perspective.     .

## 2023-02-07 NOTE — ANESTHESIA POSTPROCEDURE EVALUATION
Anesthesia Post Evaluation    Patient: Vi Sultana    Procedure(s) Performed: Procedure(s) (LRB):  COLON (N/A)  COLONOSCOPY, WITH POLYPECTOMY USING SNARE  COLONOSCOPY, WITH HEMORRHAGE CONTROL  COLONOSCOPY, WITH 1 OR MORE BIOPSIES    Final Anesthesia Type: general      Patient location during evaluation: OPS  Patient participation: Yes- Able to Participate  Level of consciousness: awake and oriented  Post-procedure vital signs: reviewed and stable  Pain management: adequate  Airway patency: patent    PONV status at discharge: No PONV  Anesthetic complications: no      Cardiovascular status: stable and hemodynamically stable  Respiratory status: unassisted and spontaneous ventilation  Hydration status: euvolemic  Follow-up not needed.          Vitals Value Taken Time   /85 02/07/23 1042   Temp 36.3 °C (97.3 °F) 02/07/23 1028   Pulse 73 02/07/23 1042   Resp 16 02/07/23 1042   SpO2 99 % 02/07/23 1042         No case tracking events are documented in the log.      Pain/Alex Score: Alex Score: 10 (2/7/2023 10:42 AM)

## 2023-02-08 ENCOUNTER — PATIENT MESSAGE (OUTPATIENT)
Dept: ADMINISTRATIVE | Facility: OTHER | Age: 68
End: 2023-02-08
Payer: MEDICAID

## 2023-02-08 LAB — PSYCHE PATHOLOGY RESULT: NORMAL

## 2023-02-09 ENCOUNTER — PATIENT MESSAGE (OUTPATIENT)
Dept: ADMINISTRATIVE | Facility: OTHER | Age: 68
End: 2023-02-09
Payer: MEDICAID

## 2023-04-13 ENCOUNTER — HOSPITAL ENCOUNTER (OUTPATIENT)
Dept: RADIOLOGY | Facility: HOSPITAL | Age: 68
Discharge: HOME | End: 2023-04-13
Attending: FAMILY MEDICINE
Payer: MEDICAID

## 2023-04-13 DIAGNOSIS — Z12.31 ENCOUNTER FOR SCREENING MAMMOGRAM FOR BREAST CANCER: ICD-10-CM

## 2023-04-13 PROCEDURE — 77067 SCR MAMMO BI INCL CAD: CPT | Mod: 26,,, | Performed by: STUDENT IN AN ORGANIZED HEALTH CARE EDUCATION/TRAINING PROGRAM

## 2023-04-13 PROCEDURE — 77063 MAMMO DIGITAL SCREENING BILAT WITH TOMO: ICD-10-PCS | Mod: 26,,, | Performed by: STUDENT IN AN ORGANIZED HEALTH CARE EDUCATION/TRAINING PROGRAM

## 2023-04-13 PROCEDURE — 77063 BREAST TOMOSYNTHESIS BI: CPT | Mod: 26,,, | Performed by: STUDENT IN AN ORGANIZED HEALTH CARE EDUCATION/TRAINING PROGRAM

## 2023-04-13 PROCEDURE — 77067 MAMMO DIGITAL SCREENING BILAT WITH TOMO: ICD-10-PCS | Mod: 26,,, | Performed by: STUDENT IN AN ORGANIZED HEALTH CARE EDUCATION/TRAINING PROGRAM

## 2023-04-13 PROCEDURE — 77067 SCR MAMMO BI INCL CAD: CPT | Mod: TC

## 2023-07-18 ENCOUNTER — LAB VISIT (OUTPATIENT)
Dept: LAB | Facility: HOSPITAL | Age: 68
End: 2023-07-18
Attending: FAMILY MEDICINE
Payer: MEDICAID

## 2023-07-18 DIAGNOSIS — E11.00 TYPE II DIABETES MELLITUS WITH HYPEROSMOLARITY, UNCONTROLLED: ICD-10-CM

## 2023-07-18 DIAGNOSIS — I11.9 MALIGNANT HYPERTENSIVE HEART DISEASE WITHOUT HEART FAILURE: Primary | ICD-10-CM

## 2023-07-18 LAB
ALBUMIN SERPL-MCNC: 3.5 G/DL (ref 3.4–4.8)
ALBUMIN/GLOB SERPL: 1 RATIO (ref 1.1–2)
ALP SERPL-CCNC: 77 UNIT/L (ref 40–150)
ALT SERPL-CCNC: 13 UNIT/L (ref 0–55)
AST SERPL-CCNC: 15 UNIT/L (ref 5–34)
BASOPHILS # BLD AUTO: 0.06 X10(3)/MCL
BASOPHILS NFR BLD AUTO: 0.8 %
BILIRUBIN DIRECT+TOT PNL SERPL-MCNC: 0.8 MG/DL
BUN SERPL-MCNC: 29 MG/DL (ref 9.8–20.1)
CALCIUM SERPL-MCNC: 10.2 MG/DL (ref 8.4–10.2)
CHLORIDE SERPL-SCNC: 105 MMOL/L (ref 98–107)
CO2 SERPL-SCNC: 28 MMOL/L (ref 23–31)
CREAT SERPL-MCNC: 1.21 MG/DL (ref 0.55–1.02)
EOSINOPHIL # BLD AUTO: 0.1 X10(3)/MCL (ref 0–0.9)
EOSINOPHIL NFR BLD AUTO: 1.4 %
ERYTHROCYTE [DISTWIDTH] IN BLOOD BY AUTOMATED COUNT: 14.4 % (ref 11.5–17)
EST. AVERAGE GLUCOSE BLD GHB EST-MCNC: 137 MG/DL
GFR SERPLBLD CREATININE-BSD FMLA CKD-EPI: 49 MLS/MIN/1.73/M2
GLOBULIN SER-MCNC: 3.5 GM/DL (ref 2.4–3.5)
GLUCOSE SERPL-MCNC: 163 MG/DL (ref 82–115)
HBA1C MFR BLD: 6.4 %
HCT VFR BLD AUTO: 39.2 % (ref 37–47)
HGB BLD-MCNC: 12.1 G/DL (ref 12–16)
IMM GRANULOCYTES # BLD AUTO: 0.03 X10(3)/MCL (ref 0–0.04)
IMM GRANULOCYTES NFR BLD AUTO: 0.4 %
LYMPHOCYTES # BLD AUTO: 1.66 X10(3)/MCL (ref 0.6–4.6)
LYMPHOCYTES NFR BLD AUTO: 22.7 %
MCH RBC QN AUTO: 27.8 PG (ref 27–31)
MCHC RBC AUTO-ENTMCNC: 30.9 G/DL (ref 33–36)
MCV RBC AUTO: 89.9 FL (ref 80–94)
MICROALBUMIN UR-MCNC: 139.1 UG/ML
MONOCYTES # BLD AUTO: 0.36 X10(3)/MCL (ref 0.1–1.3)
MONOCYTES NFR BLD AUTO: 4.9 %
NEUTROPHILS # BLD AUTO: 5.1 X10(3)/MCL (ref 2.1–9.2)
NEUTROPHILS NFR BLD AUTO: 69.8 %
PLATELET # BLD AUTO: 187 X10(3)/MCL (ref 130–400)
PMV BLD AUTO: 11.5 FL (ref 7.4–10.4)
POTASSIUM SERPL-SCNC: 4.7 MMOL/L (ref 3.5–5.1)
PROT SERPL-MCNC: 7 GM/DL (ref 5.8–7.6)
RBC # BLD AUTO: 4.36 X10(6)/MCL (ref 4.2–5.4)
SODIUM SERPL-SCNC: 141 MMOL/L (ref 136–145)
WBC # SPEC AUTO: 7.31 X10(3)/MCL (ref 4.5–11.5)

## 2023-07-18 PROCEDURE — 83036 HEMOGLOBIN GLYCOSYLATED A1C: CPT

## 2023-07-18 PROCEDURE — 36415 COLL VENOUS BLD VENIPUNCTURE: CPT

## 2023-07-18 PROCEDURE — 80053 COMPREHEN METABOLIC PANEL: CPT

## 2023-07-18 PROCEDURE — 82043 UR ALBUMIN QUANTITATIVE: CPT

## 2023-07-18 PROCEDURE — 85025 COMPLETE CBC W/AUTO DIFF WBC: CPT

## 2023-08-18 ENCOUNTER — PATIENT MESSAGE (OUTPATIENT)
Dept: ADMINISTRATIVE | Facility: OTHER | Age: 68
End: 2023-08-18
Payer: MEDICARE

## 2023-08-19 ENCOUNTER — PATIENT MESSAGE (OUTPATIENT)
Dept: ADMINISTRATIVE | Facility: OTHER | Age: 68
End: 2023-08-19
Payer: MEDICARE

## 2023-08-20 ENCOUNTER — PATIENT MESSAGE (OUTPATIENT)
Dept: ADMINISTRATIVE | Facility: OTHER | Age: 68
End: 2023-08-20
Payer: MEDICARE

## 2023-08-21 ENCOUNTER — HOSPITAL ENCOUNTER (OUTPATIENT)
Facility: HOSPITAL | Age: 68
Discharge: HOME | End: 2023-08-21
Attending: INTERNAL MEDICINE | Admitting: INTERNAL MEDICINE
Payer: MEDICARE

## 2023-08-21 ENCOUNTER — ANESTHESIA EVENT (OUTPATIENT)
Dept: ENDOSCOPY | Facility: HOSPITAL | Age: 68
End: 2023-08-21
Payer: MEDICARE

## 2023-08-21 ENCOUNTER — ANESTHESIA (OUTPATIENT)
Dept: ENDOSCOPY | Facility: HOSPITAL | Age: 68
End: 2023-08-21
Payer: MEDICARE

## 2023-08-21 VITALS
SYSTOLIC BLOOD PRESSURE: 121 MMHG | HEART RATE: 77 BPM | OXYGEN SATURATION: 97 % | RESPIRATION RATE: 20 BRPM | BODY MASS INDEX: 44.41 KG/M2 | HEIGHT: 68 IN | WEIGHT: 293 LBS | TEMPERATURE: 98 F | DIASTOLIC BLOOD PRESSURE: 67 MMHG

## 2023-08-21 DIAGNOSIS — Z86.010 PERSONAL HISTORY OF COLONIC POLYPS: Primary | ICD-10-CM

## 2023-08-21 LAB — GLUCOSE SERPL-MCNC: 131 MG/DL (ref 70–110)

## 2023-08-21 PROCEDURE — 37000009 HC ANESTHESIA EA ADD 15 MINS: Performed by: INTERNAL MEDICINE

## 2023-08-21 PROCEDURE — 27201423 OPTIME MED/SURG SUP & DEVICES STERILE SUPPLY: Performed by: INTERNAL MEDICINE

## 2023-08-21 PROCEDURE — D9220A PRA ANESTHESIA: ICD-10-PCS | Mod: PT,ANES,, | Performed by: ANESTHESIOLOGY

## 2023-08-21 PROCEDURE — 63600175 PHARM REV CODE 636 W HCPCS: Performed by: STUDENT IN AN ORGANIZED HEALTH CARE EDUCATION/TRAINING PROGRAM

## 2023-08-21 PROCEDURE — 37000008 HC ANESTHESIA 1ST 15 MINUTES: Performed by: INTERNAL MEDICINE

## 2023-08-21 PROCEDURE — D9220A PRA ANESTHESIA: ICD-10-PCS | Mod: PT,CRNA,, | Performed by: STUDENT IN AN ORGANIZED HEALTH CARE EDUCATION/TRAINING PROGRAM

## 2023-08-21 PROCEDURE — D9220A PRA ANESTHESIA: Mod: PT,CRNA,, | Performed by: STUDENT IN AN ORGANIZED HEALTH CARE EDUCATION/TRAINING PROGRAM

## 2023-08-21 PROCEDURE — 25000003 PHARM REV CODE 250: Performed by: INTERNAL MEDICINE

## 2023-08-21 PROCEDURE — 88305 TISSUE EXAM BY PATHOLOGIST: CPT | Performed by: INTERNAL MEDICINE

## 2023-08-21 PROCEDURE — D9220A PRA ANESTHESIA: Mod: PT,ANES,, | Performed by: ANESTHESIOLOGY

## 2023-08-21 PROCEDURE — 25000003 PHARM REV CODE 250: Performed by: STUDENT IN AN ORGANIZED HEALTH CARE EDUCATION/TRAINING PROGRAM

## 2023-08-21 PROCEDURE — 45385 COLONOSCOPY W/LESION REMOVAL: CPT | Performed by: INTERNAL MEDICINE

## 2023-08-21 RX ORDER — ONDANSETRON 2 MG/ML
4 INJECTION INTRAMUSCULAR; INTRAVENOUS ONCE AS NEEDED
Status: CANCELLED | OUTPATIENT
Start: 2023-08-21 | End: 2035-01-17

## 2023-08-21 RX ORDER — PHENYLEPHRINE HCL IN 0.9% NACL 1 MG/10 ML
SYRINGE (ML) INTRAVENOUS
Status: DISCONTINUED | OUTPATIENT
Start: 2023-08-21 | End: 2023-08-21

## 2023-08-21 RX ORDER — PROPOFOL 10 MG/ML
VIAL (ML) INTRAVENOUS
Status: COMPLETED
Start: 2023-08-21 | End: 2023-08-21

## 2023-08-21 RX ORDER — PROPOFOL 10 MG/ML
VIAL (ML) INTRAVENOUS
Status: DISCONTINUED
Start: 2023-08-21 | End: 2023-08-21 | Stop reason: HOSPADM

## 2023-08-21 RX ORDER — PROPOFOL 10 MG/ML
VIAL (ML) INTRAVENOUS
Status: DISCONTINUED | OUTPATIENT
Start: 2023-08-21 | End: 2023-08-21

## 2023-08-21 RX ORDER — LIDOCAINE HYDROCHLORIDE 20 MG/ML
INJECTION INTRAVENOUS
Status: DISCONTINUED | OUTPATIENT
Start: 2023-08-21 | End: 2023-08-21

## 2023-08-21 RX ORDER — EPINEPHRINE 0.1 MG/ML
INJECTION INTRAVENOUS
Status: DISCONTINUED
Start: 2023-08-21 | End: 2023-08-21 | Stop reason: HOSPADM

## 2023-08-21 RX ORDER — PHENYLEPHRINE HCL IN 0.9% NACL 1 MG/10 ML
SYRINGE (ML) INTRAVENOUS
Status: COMPLETED
Start: 2023-08-21 | End: 2023-08-21

## 2023-08-21 RX ORDER — SODIUM CHLORIDE, SODIUM GLUCONATE, SODIUM ACETATE, POTASSIUM CHLORIDE AND MAGNESIUM CHLORIDE 30; 37; 368; 526; 502 MG/100ML; MG/100ML; MG/100ML; MG/100ML; MG/100ML
INJECTION, SOLUTION INTRAVENOUS CONTINUOUS
Status: CANCELLED | OUTPATIENT
Start: 2023-08-21 | End: 2023-09-20

## 2023-08-21 RX ORDER — SODIUM CHLORIDE, SODIUM GLUCONATE, SODIUM ACETATE, POTASSIUM CHLORIDE AND MAGNESIUM CHLORIDE 30; 37; 368; 526; 502 MG/100ML; MG/100ML; MG/100ML; MG/100ML; MG/100ML
INJECTION, SOLUTION INTRAVENOUS CONTINUOUS
Status: DISCONTINUED | OUTPATIENT
Start: 2023-08-21 | End: 2023-08-21 | Stop reason: HOSPADM

## 2023-08-21 RX ORDER — LIDOCAINE HYDROCHLORIDE 10 MG/ML
INJECTION, SOLUTION EPIDURAL; INFILTRATION; INTRACAUDAL; PERINEURAL
Status: DISCONTINUED
Start: 2023-08-21 | End: 2023-08-21 | Stop reason: HOSPADM

## 2023-08-21 RX ADMIN — SODIUM CHLORIDE, SODIUM GLUCONATE, SODIUM ACETATE, POTASSIUM CHLORIDE AND MAGNESIUM CHLORIDE: 526; 502; 368; 37; 30 INJECTION, SOLUTION INTRAVENOUS at 10:08

## 2023-08-21 RX ADMIN — Medication 100 MCG: at 11:08

## 2023-08-21 RX ADMIN — LIDOCAINE HYDROCHLORIDE 50 MG: 20 INJECTION INTRAVENOUS at 10:08

## 2023-08-21 RX ADMIN — PROPOFOL 75 MG: 10 INJECTION, EMULSION INTRAVENOUS at 10:08

## 2023-08-21 RX ADMIN — PROPOFOL 75 MCG/KG/MIN: 10 INJECTION, EMULSION INTRAVENOUS at 10:08

## 2023-08-21 RX ADMIN — SODIUM CHLORIDE, SODIUM GLUCONATE, SODIUM ACETATE, POTASSIUM CHLORIDE AND MAGNESIUM CHLORIDE: 526; 502; 368; 37; 30 INJECTION, SOLUTION INTRAVENOUS at 09:08

## 2023-08-21 RX ADMIN — Medication 100 MCG: at 10:08

## 2023-08-21 NOTE — TRANSFER OF CARE
"Anesthesia Transfer of Care Note    Patient: Vi Sultana    Procedure(s) Performed: Procedure(s) (LRB):  1000 COLON (N/A)  COLONOSCOPY, WITH POLYPECTOMY USING SNARE  COLONOSCOPY, WITH HEMORRHAGE CONTROL    Patient location: GI    Anesthesia Type: general and other: TIVA w/ natural airway    Transport from OR: Transported from OR on room air with adequate spontaneous ventilation    Post pain: adequate analgesia    Post assessment: no apparent anesthetic complications    Post vital signs: stable    Level of consciousness: awake and alert    Nausea/Vomiting: no nausea/vomiting    Complications: none    Transfer of care protocol was followed      Last vitals:   Visit Vitals  BP (!) 147/85 (BP Location: Left arm, Patient Position: Lying)   Pulse 81   Temp 36.4 °C (97.5 °F) (Skin)   Resp 17   Ht 5' 8" (1.727 m)   Wt 133.8 kg (295 lb)   SpO2 98%   Breastfeeding No   BMI 44.85 kg/m²     "

## 2023-08-21 NOTE — DISCHARGE SUMMARY
[facility] Endoscopy  Discharge Note  Short Stay    Procedure(s) (LRB):  1000 COLON (N/A)  COLONOSCOPY, WITH POLYPECTOMY USING SNARE  COLONOSCOPY, WITH HEMORRHAGE CONTROL      OUTCOME: Patient tolerated treatment/procedure well without complication and is now ready for discharge.    DISPOSITION: Home or Self Care    FINAL DIAGNOSIS:  Personal history of colonic polyps    FOLLOWUP: In clinic    DISCHARGE INSTRUCTIONS:    Discharge Procedure Orders   Diet general         Clinical Reference Documents Added to Patient Instructions         Document    COLON POLYPECTOMY (ENGLISH)    COLONOSCOPY DISCHARGE INSTRUCTIONS (ENGLISH)    MODERATE AND DEEP SEDATION IN ADULTS (ENGLISH)            TIME SPENT ON DISCHARGE: 5 minutes

## 2023-08-21 NOTE — ANESTHESIA PREPROCEDURE EVALUATION
08/21/2023  Vi Sultana is a 67 y.o., female   Pre-operative evaluation for Procedure(s) (LRB):  1000 COLON (N/A)    Vi Sultana is a 67 y.o. female     Patient Active Problem List   Diagnosis    Personal history of colonic polyps       Review of patient's allergies indicates:   Allergen Reactions    Opioids - morphine analogues Itching    Avocado (laurus persea)        No current facility-administered medications on file prior to encounter.     Current Outpatient Medications on File Prior to Encounter   Medication Sig Dispense Refill    aspirin (ECOTRIN) 81 MG EC tablet Take 81 mg by mouth once daily.      allopurinoL (ZYLOPRIM) 100 MG tablet Take 100 mg by mouth once daily.      ferrous gluconate (FERGON) 324 MG tablet Take 324 mg by mouth daily with breakfast.      furosemide (LASIX) 40 MG tablet Take 40 mg by mouth 2 (two) times daily.      liraglutide 0.6 mg/0.1 mL, 18 mg/3 mL, subq PNIJ (VICTOZA 2-VIKRAM) 0.6 mg/0.1 mL (18 mg/3 mL) PnIj pen Inject 0.6 mg into the skin once daily.      rosuvastatin (CRESTOR) 40 MG Tab Take 10 mg by mouth every evening.      sertraline (ZOLOFT) 100 MG tablet Take 100 mg by mouth once daily.         Past Surgical History:   Procedure Laterality Date    COLONOSCOPY N/A 2/7/2023    Procedure: COLON;  Surgeon: Simin Cruz MD;  Location: [facility] ENDOSCOPY;  Service: Gastroenterology;  Laterality: N/A;    COLONOSCOPY  2/7/2023    Procedure: COLONOSCOPY, WITH HEMORRHAGE CONTROL;  Surgeon: Simin Cruz MD;  Location: [facility] ENDOSCOPY;  Service: Gastroenterology;;    COLONOSCOPY, WITH 1 OR MORE BIOPSIES  2/7/2023    Procedure: COLONOSCOPY, WITH 1 OR MORE BIOPSIES;  Surgeon: Simin Cruz MD;  Location: [facility] ENDOSCOPY;  Service: Gastroenterology;;    COLONOSCOPY, WITH POLYPECTOMY USING SNARE  2/7/2023    Procedure: COLONOSCOPY, WITH POLYPECTOMY USING  "SNARE;  Surgeon: Simin Cruz MD;  Location: [facility] ENDOSCOPY;  Service: Gastroenterology;;       CBC: No results for input(s): "WBC", "RBC", "HGB", "HCT", "PLT", "MCV", "MCH", "MCHC" in the last 72 hours.    CMP 7/18/2023: K=4.7; Cr mod elev=1.21, low eGFR=49.   No results for input(s): "NA", "K", "CL", "CO2", "BUN", "CREATININE", "GLU", "MG", "PHOS", "CALCIUM", "ALBUMIN", "PROT", "ALKPHOS", "ALT", "AST", "BILITOT" in the last 72 hours.    INR  No results for input(s): "PT", "INR", "PROTIME", "APTT" in the last 72 hours.        Diagnostic Studies:  CXR :    EKG :  2D Echo 9/5/2019: EF= 67%. RVSP=20.Mild TR/Mod MR. Mild LAD.          Pre-op Assessment    I have reviewed the Patient Summary Reports.     I have reviewed the Nursing Notes. I have reviewed the NPO Status.   I have reviewed the Medications.     Review of Systems  Anesthesia Hx:  No problems with previous Anesthesia  History of prior surgery of interest to airway management or planning: Previous anesthesia: General GB: ; multiple colonoscopies;  with general anesthesia.  Airway issues documented on chart review include GETA  Denies Family Hx of Anesthesia complications.   Denies Personal Hx of Anesthesia complications.   Social:  No Alcohol Use, Former Smoker    Hematology/Oncology:  Hematology Normal        EENT/Dental:EENT/Dental Normal   Cardiovascular:   Denies Pacemaker. Hypertension Valvular problems/Murmurs, MR  Denies MI.  Denies CAD.    Denies CABG/stent.   Denies Angina.  Denies CHF.  Denies Orthopnea.  Denies PND. DALY Echo 9/5/2019: EF= 67%. RVSP=20.Mild TR/Mod MR. Mild LAD. Valvular Heart Disease: Mitral Regurgitation (MR), moderate, Tricuspid Regurgitation (TR), mild     Pulmonary:   Denies Shortness of breath.  Denies Sleep Apnea.    Renal/:   Chronic Renal Disease, CKD    Hepatic/GI:  Hepatic/GI Normal  Denies GERD.    Musculoskeletal:  Musculoskeletal Normal    Neurological:  Neurology Normal Denies TIA.  Denies CVA.    Endocrine:   " Diabetes, well controlled  Morbid Obesity / BMI > 40  Dermatological:  Skin Normal    Psych:  Psychiatric Normal           Physical Exam  General: Well nourished, Cooperative, Alert and Oriented  flabby  Airway:  Mallampati: II / I  Mouth Opening: Normal  TM Distance: > 6 cm  Tongue: Normal  Neck ROM: Normal ROM  Neck: Girth Increased  Redundant neck tissue  Dental:  Periodontal disease  Px denies any loose teeth.  Chest/Lungs:  Normal Respiratory Rate    Heart:  Rate: Normal    Abdomen:  Normal, Soft        Anesthesia Plan  Type of Anesthesia, risks & benefits discussed:    Anesthesia Type: Gen Natural Airway  Intra-op Monitoring Plan: Standard ASA Monitors  Induction:  IV  Informed Consent: Informed consent signed with the Patient and all parties understand the risks and agree with anesthesia plan.  All questions answered.   ASA Score: 3  Day of Surgery Review of History & Physical: H&P Update referred to the surgeon/provider.I have interviewed and examined the patient. I have reviewed the patient's H&P dated:   Anesthesia Plan Notes: TIVA with mask/NC, GA back-up.     Ready For Surgery From Anesthesia Perspective.     .

## 2023-08-21 NOTE — ANESTHESIA POSTPROCEDURE EVALUATION
Anesthesia Post Evaluation    Patient: Vi Sultana    Procedure(s) Performed: Procedure(s) (LRB):  1000 COLON (N/A)  COLONOSCOPY, WITH POLYPECTOMY USING SNARE  COLONOSCOPY, WITH HEMORRHAGE CONTROL    Final Anesthesia Type: general (-TIVA w Amanda AW)      Patient location during evaluation: GI PACU  Patient participation: Yes- Able to Participate  Level of consciousness: awake and alert, awake and oriented  Post-procedure vital signs: reviewed and stable  Pain management: adequate  Airway patency: patent    PONV status at discharge: No PONV  Anesthetic complications: no      Cardiovascular status: blood pressure returned to baseline, hemodynamically stable and stable  Respiratory status: unassisted, spontaneous ventilation and room air  Hydration status: euvolemic  Follow-up not needed.          Vitals Value Taken Time   /67 08/21/23 1201   Temp 36.7 °C (98.1 °F) 08/21/23 1152   Pulse 77 08/21/23 1201   Resp 20 08/21/23 1201   SpO2 97 % 08/21/23 1201         No case tracking events are documented in the log.      Pain/Alex Score: Alex Score: 9 (8/21/2023 11:45 AM)

## 2023-08-21 NOTE — PROCEDURES
Vi Sultana   MRN: [id number]   ADMISSION DATE: 2023  : 1955  AGE: 67 y.o.    PROCEDURE:  Colonoscopy with snare polypectomy     DATE OF PROCEDURE:  2023     SURGEON: DAKOTA NAVAS    PREOPERATIVE DIAGNOSIS:  History of colon polyps    POSTOPERATIVE DIAGNOSIS:  1. Multiple colon polyps.  Snare polypectomy as well as endo clipping x1 was done.    2. Transverse and left colon diverticula.  3. Internal hemorrhoids, grade   2.  Otherwise normal exam    HISTORY AND PHYSICAL:  As per preoperative note.      DESCRIPTION OF PROCEDURE:  Informed consent was obtained.  Patient was placed in left lateral position.  Sedation per anesthesia service.  Rectal exam was unremarkable.  Olympus video colonoscope was introduced into the rectum and was carefully advanced to cecum.  Quality of the preparation was fair after irrigation.   Patient tolerated the procedure well without any complications.    FINDINGS:  There were multiple diverticula noted in the region of transverse and left colon.  Photodocumentation was obtained.  There was a 4-5 mm polyp noted in the region of splenic flexure.  Snare polypectomy was done.  The polyp came off before the cautery could be applied.  There was some bleeding noted at this site.  Sclerotherapy with 1 cc epinephrine was done along with hot snare polypectomy with complete hemostasis.  There were 2 polyps ranging in size from 3-5 mm noted in transverse colon.  Hot snare polypectomy was done.  There was good hemostasis.  There was an approximately 1 cm tubular polyp noted in the region of ascending colon.  Hot snare polypectomy was done.  Base was clipped prophylactically to decrease risk of post polypectomy bleeding.  There was good hemostasis.  Cecum appeared unremarkable.  Terminal ileum was intubated during procedure and appeared to be unremarkable.  Adequate decompression as well as aggressive irrigation was done during the procedure for better visualization.  Estimated  withdrawal time from cecum to rectum was 18 minutes and 49 seconds    ESTIMATED BLOOD LOSS:  7-8 cc    COMPLICATIONS:  None    RECOMMENDATIONS:  1. Follow up biopsy results.  2. Repeat colonoscopy in 3 years pending biopsy results.  3. Avoid NSAIDs for 2 weeks

## 2023-08-22 ENCOUNTER — PATIENT MESSAGE (OUTPATIENT)
Dept: ADMINISTRATIVE | Facility: OTHER | Age: 68
End: 2023-08-22
Payer: MEDICARE

## 2023-08-22 LAB
POCT GLUCOSE: 131 MG/DL (ref 70–110)
PSYCHE PATHOLOGY RESULT: NORMAL

## 2023-08-23 ENCOUNTER — PATIENT MESSAGE (OUTPATIENT)
Dept: ADMINISTRATIVE | Facility: OTHER | Age: 68
End: 2023-08-23
Payer: MEDICARE

## 2024-01-26 ENCOUNTER — LAB VISIT (OUTPATIENT)
Dept: LAB | Facility: HOSPITAL | Age: 69
End: 2024-01-26
Attending: FAMILY MEDICINE
Payer: MEDICARE

## 2024-01-26 DIAGNOSIS — E11.00 TYPE II DIABETES MELLITUS WITH HYPEROSMOLARITY, UNCONTROLLED: ICD-10-CM

## 2024-01-26 DIAGNOSIS — E78.2 MIXED HYPERLIPIDEMIA: ICD-10-CM

## 2024-01-26 DIAGNOSIS — I11.9 MALIGNANT HYPERTENSIVE HEART DISEASE WITHOUT HEART FAILURE: Primary | ICD-10-CM

## 2024-01-26 LAB
ALBUMIN SERPL-MCNC: 3.6 G/DL (ref 3.4–4.8)
ALBUMIN/GLOB SERPL: 1 RATIO (ref 1.1–2)
ALP SERPL-CCNC: 75 UNIT/L (ref 40–150)
ALT SERPL-CCNC: 18 UNIT/L (ref 0–55)
AST SERPL-CCNC: 17 UNIT/L (ref 5–34)
BASOPHILS # BLD AUTO: 0.1 X10(3)/MCL
BASOPHILS NFR BLD AUTO: 1.1 %
BILIRUB SERPL-MCNC: 0.8 MG/DL
BUN SERPL-MCNC: 30 MG/DL (ref 9.8–20.1)
CALCIUM SERPL-MCNC: 10.6 MG/DL (ref 8.4–10.2)
CHLORIDE SERPL-SCNC: 101 MMOL/L (ref 98–107)
CHOLEST SERPL-MCNC: 158 MG/DL
CHOLEST/HDLC SERPL: 5 {RATIO} (ref 0–5)
CO2 SERPL-SCNC: 25 MMOL/L (ref 23–31)
CREAT SERPL-MCNC: 1.2 MG/DL (ref 0.55–1.02)
EOSINOPHIL # BLD AUTO: 0.2 X10(3)/MCL (ref 0–0.9)
EOSINOPHIL NFR BLD AUTO: 2.2 %
ERYTHROCYTE [DISTWIDTH] IN BLOOD BY AUTOMATED COUNT: 14.5 % (ref 11.5–17)
EST. AVERAGE GLUCOSE BLD GHB EST-MCNC: 168.6 MG/DL
GFR SERPLBLD CREATININE-BSD FMLA CKD-EPI: 49 MLS/MIN/1.73/M2
GLOBULIN SER-MCNC: 3.6 GM/DL (ref 2.4–3.5)
GLUCOSE SERPL-MCNC: 209 MG/DL (ref 82–115)
HBA1C MFR BLD: 7.5 %
HCT VFR BLD AUTO: 39.5 % (ref 37–47)
HDLC SERPL-MCNC: 32 MG/DL (ref 35–60)
HGB BLD-MCNC: 12.4 G/DL (ref 12–16)
IMM GRANULOCYTES # BLD AUTO: 0.03 X10(3)/MCL (ref 0–0.04)
IMM GRANULOCYTES NFR BLD AUTO: 0.3 %
LDLC SERPL CALC-MCNC: 72 MG/DL (ref 50–140)
LYMPHOCYTES # BLD AUTO: 2.84 X10(3)/MCL (ref 0.6–4.6)
LYMPHOCYTES NFR BLD AUTO: 31.4 %
MCH RBC QN AUTO: 27.9 PG (ref 27–31)
MCHC RBC AUTO-ENTMCNC: 31.4 G/DL (ref 33–36)
MCV RBC AUTO: 89 FL (ref 80–94)
MONOCYTES # BLD AUTO: 0.47 X10(3)/MCL (ref 0.1–1.3)
MONOCYTES NFR BLD AUTO: 5.2 %
NEUTROPHILS # BLD AUTO: 5.4 X10(3)/MCL (ref 2.1–9.2)
NEUTROPHILS NFR BLD AUTO: 59.8 %
PLATELET # BLD AUTO: 208 X10(3)/MCL (ref 130–400)
PMV BLD AUTO: 11.9 FL (ref 7.4–10.4)
POTASSIUM SERPL-SCNC: 4.5 MMOL/L (ref 3.5–5.1)
PROT SERPL-MCNC: 7.2 GM/DL (ref 5.8–7.6)
RBC # BLD AUTO: 4.44 X10(6)/MCL (ref 4.2–5.4)
SODIUM SERPL-SCNC: 137 MMOL/L (ref 136–145)
TRIGL SERPL-MCNC: 269 MG/DL (ref 37–140)
VLDLC SERPL CALC-MCNC: 54 MG/DL
WBC # SPEC AUTO: 9.04 X10(3)/MCL (ref 4.5–11.5)

## 2024-01-26 PROCEDURE — 83036 HEMOGLOBIN GLYCOSYLATED A1C: CPT

## 2024-01-26 PROCEDURE — 80061 LIPID PANEL: CPT

## 2024-01-26 PROCEDURE — 80053 COMPREHEN METABOLIC PANEL: CPT

## 2024-01-26 PROCEDURE — 85025 COMPLETE CBC W/AUTO DIFF WBC: CPT

## 2024-01-26 PROCEDURE — 36415 COLL VENOUS BLD VENIPUNCTURE: CPT

## 2024-03-05 DIAGNOSIS — Z12.31 ENCOUNTER FOR SCREENING MAMMOGRAM FOR BREAST CANCER: ICD-10-CM

## 2024-03-05 DIAGNOSIS — Z78.0 POSTMENOPAUSAL: Primary | ICD-10-CM

## 2024-05-14 ENCOUNTER — LAB VISIT (OUTPATIENT)
Dept: LAB | Facility: HOSPITAL | Age: 69
End: 2024-05-14
Attending: DENTIST
Payer: MEDICARE

## 2024-05-14 DIAGNOSIS — E78.5 HYPERLIPIDEMIA, UNSPECIFIED HYPERLIPIDEMIA TYPE: Primary | ICD-10-CM

## 2024-05-14 DIAGNOSIS — R79.9 ABNORMAL FINDING OF BLOOD CHEMISTRY, UNSPECIFIED: ICD-10-CM

## 2024-05-14 LAB
ALBUMIN SERPL-MCNC: 3.2 G/DL (ref 3.4–4.8)
ALBUMIN/GLOB SERPL: 1 RATIO (ref 1.1–2)
ALP SERPL-CCNC: 60 UNIT/L (ref 40–150)
ALT SERPL-CCNC: 13 UNIT/L (ref 0–55)
AST SERPL-CCNC: 14 UNIT/L (ref 5–34)
BASOPHILS # BLD AUTO: 0.08 X10(3)/MCL
BASOPHILS NFR BLD AUTO: 1.1 %
BILIRUB SERPL-MCNC: 0.9 MG/DL
BUN SERPL-MCNC: 23 MG/DL (ref 9.8–20.1)
CALCIUM SERPL-MCNC: 9.6 MG/DL (ref 8.4–10.2)
CHLORIDE SERPL-SCNC: 106 MMOL/L (ref 98–107)
CHOLEST SERPL-MCNC: 137 MG/DL
CHOLEST/HDLC SERPL: 4 {RATIO} (ref 0–5)
CO2 SERPL-SCNC: 29 MMOL/L (ref 23–31)
CREAT SERPL-MCNC: 1.1 MG/DL (ref 0.55–1.02)
EOSINOPHIL # BLD AUTO: 0.22 X10(3)/MCL (ref 0–0.9)
EOSINOPHIL NFR BLD AUTO: 3.1 %
ERYTHROCYTE [DISTWIDTH] IN BLOOD BY AUTOMATED COUNT: 14.5 % (ref 11.5–17)
EST. AVERAGE GLUCOSE BLD GHB EST-MCNC: 142.7 MG/DL
GFR SERPLBLD CREATININE-BSD FMLA CKD-EPI: 55 ML/MIN/1.73/M2
GLOBULIN SER-MCNC: 3.3 GM/DL (ref 2.4–3.5)
GLUCOSE SERPL-MCNC: 160 MG/DL (ref 82–115)
HBA1C MFR BLD: 6.6 %
HCT VFR BLD AUTO: 38.4 % (ref 37–47)
HDLC SERPL-MCNC: 32 MG/DL (ref 35–60)
HGB BLD-MCNC: 11.7 G/DL (ref 12–16)
IMM GRANULOCYTES # BLD AUTO: 0.02 X10(3)/MCL (ref 0–0.04)
IMM GRANULOCYTES NFR BLD AUTO: 0.3 %
LDLC SERPL CALC-MCNC: 54 MG/DL (ref 50–140)
LYMPHOCYTES # BLD AUTO: 1.86 X10(3)/MCL (ref 0.6–4.6)
LYMPHOCYTES NFR BLD AUTO: 26.1 %
MCH RBC QN AUTO: 27.5 PG (ref 27–31)
MCHC RBC AUTO-ENTMCNC: 30.5 G/DL (ref 33–36)
MCV RBC AUTO: 90.1 FL (ref 80–94)
MONOCYTES # BLD AUTO: 0.38 X10(3)/MCL (ref 0.1–1.3)
MONOCYTES NFR BLD AUTO: 5.3 %
NEUTROPHILS # BLD AUTO: 4.58 X10(3)/MCL (ref 2.1–9.2)
NEUTROPHILS NFR BLD AUTO: 64.1 %
PLATELET # BLD AUTO: 168 X10(3)/MCL (ref 130–400)
PMV BLD AUTO: 12.2 FL (ref 7.4–10.4)
POTASSIUM SERPL-SCNC: 4 MMOL/L (ref 3.5–5.1)
PROT SERPL-MCNC: 6.5 GM/DL (ref 5.8–7.6)
RBC # BLD AUTO: 4.26 X10(6)/MCL (ref 4.2–5.4)
SODIUM SERPL-SCNC: 141 MMOL/L (ref 136–145)
TRIGL SERPL-MCNC: 255 MG/DL (ref 37–140)
TSH SERPL-ACNC: 3.72 UIU/ML (ref 0.35–4.94)
VLDLC SERPL CALC-MCNC: 51 MG/DL
WBC # SPEC AUTO: 7.14 X10(3)/MCL (ref 4.5–11.5)

## 2024-05-14 PROCEDURE — 83036 HEMOGLOBIN GLYCOSYLATED A1C: CPT

## 2024-05-14 PROCEDURE — 85025 COMPLETE CBC W/AUTO DIFF WBC: CPT

## 2024-05-14 PROCEDURE — 80053 COMPREHEN METABOLIC PANEL: CPT

## 2024-05-14 PROCEDURE — 80061 LIPID PANEL: CPT

## 2024-05-14 PROCEDURE — 84443 ASSAY THYROID STIM HORMONE: CPT

## 2024-05-14 PROCEDURE — 36415 COLL VENOUS BLD VENIPUNCTURE: CPT

## 2024-07-09 ENCOUNTER — HOSPITAL ENCOUNTER (OUTPATIENT)
Dept: RADIOLOGY | Facility: HOSPITAL | Age: 69
Discharge: HOME | End: 2024-07-09
Attending: FAMILY MEDICINE
Payer: MEDICARE

## 2024-07-09 DIAGNOSIS — Z78.0 POSTMENOPAUSAL: ICD-10-CM

## 2024-07-09 DIAGNOSIS — Z12.31 ENCOUNTER FOR SCREENING MAMMOGRAM FOR BREAST CANCER: ICD-10-CM

## 2024-07-09 PROCEDURE — 77067 SCR MAMMO BI INCL CAD: CPT | Mod: TC

## 2024-07-09 PROCEDURE — 77080 DXA BONE DENSITY AXIAL: CPT | Mod: TC

## 2024-09-09 ENCOUNTER — LAB VISIT (OUTPATIENT)
Dept: LAB | Facility: HOSPITAL | Age: 69
End: 2024-09-09
Attending: FAMILY MEDICINE
Payer: MEDICARE

## 2024-09-09 DIAGNOSIS — E11.65 INADEQUATELY CONTROLLED DIABETES MELLITUS: ICD-10-CM

## 2024-09-09 DIAGNOSIS — E78.2 MIXED HYPERLIPIDEMIA: Primary | ICD-10-CM

## 2024-09-09 LAB
ALBUMIN SERPL-MCNC: 3.2 G/DL (ref 3.4–4.8)
ALBUMIN/GLOB SERPL: 1 RATIO (ref 1.1–2)
ALP SERPL-CCNC: 64 UNIT/L (ref 40–150)
ALT SERPL-CCNC: 19 UNIT/L (ref 0–55)
ANION GAP SERPL CALC-SCNC: 7 MEQ/L
AST SERPL-CCNC: 19 UNIT/L (ref 5–34)
BASOPHILS # BLD AUTO: 0.07 X10(3)/MCL
BASOPHILS NFR BLD AUTO: 1 %
BILIRUB SERPL-MCNC: 0.6 MG/DL
BUN SERPL-MCNC: 31 MG/DL (ref 9.8–20.1)
CALCIUM SERPL-MCNC: 10.3 MG/DL (ref 8.4–10.2)
CHLORIDE SERPL-SCNC: 106 MMOL/L (ref 98–107)
CHOLEST SERPL-MCNC: 158 MG/DL
CHOLEST/HDLC SERPL: 5 {RATIO} (ref 0–5)
CO2 SERPL-SCNC: 31 MMOL/L (ref 23–31)
CREAT SERPL-MCNC: 1.37 MG/DL (ref 0.55–1.02)
CREAT/UREA NIT SERPL: 23
EOSINOPHIL # BLD AUTO: 0.13 X10(3)/MCL (ref 0–0.9)
EOSINOPHIL NFR BLD AUTO: 1.9 %
ERYTHROCYTE [DISTWIDTH] IN BLOOD BY AUTOMATED COUNT: 14.5 % (ref 11.5–17)
EST. AVERAGE GLUCOSE BLD GHB EST-MCNC: 162.8 MG/DL
GFR SERPLBLD CREATININE-BSD FMLA CKD-EPI: 42 ML/MIN/1.73/M2
GLOBULIN SER-MCNC: 3.2 GM/DL (ref 2.4–3.5)
GLUCOSE SERPL-MCNC: 244 MG/DL (ref 82–115)
HBA1C MFR BLD: 7.3 %
HCT VFR BLD AUTO: 38.5 % (ref 37–47)
HDLC SERPL-MCNC: 34 MG/DL (ref 35–60)
HGB BLD-MCNC: 12 G/DL (ref 12–16)
IMM GRANULOCYTES # BLD AUTO: 0.02 X10(3)/MCL (ref 0–0.04)
IMM GRANULOCYTES NFR BLD AUTO: 0.3 %
LDLC SERPL CALC-MCNC: 68 MG/DL (ref 50–140)
LYMPHOCYTES # BLD AUTO: 1.72 X10(3)/MCL (ref 0.6–4.6)
LYMPHOCYTES NFR BLD AUTO: 25.1 %
MCH RBC QN AUTO: 28.2 PG (ref 27–31)
MCHC RBC AUTO-ENTMCNC: 31.2 G/DL (ref 33–36)
MCV RBC AUTO: 90.4 FL (ref 80–94)
MONOCYTES # BLD AUTO: 0.42 X10(3)/MCL (ref 0.1–1.3)
MONOCYTES NFR BLD AUTO: 6.1 %
NEUTROPHILS # BLD AUTO: 4.5 X10(3)/MCL (ref 2.1–9.2)
NEUTROPHILS NFR BLD AUTO: 65.6 %
PLATELET # BLD AUTO: 160 X10(3)/MCL (ref 130–400)
PMV BLD AUTO: 12 FL (ref 7.4–10.4)
POTASSIUM SERPL-SCNC: 4.5 MMOL/L (ref 3.5–5.1)
PROT SERPL-MCNC: 6.4 GM/DL (ref 5.8–7.6)
RBC # BLD AUTO: 4.26 X10(6)/MCL (ref 4.2–5.4)
SODIUM SERPL-SCNC: 144 MMOL/L (ref 136–145)
TRIGL SERPL-MCNC: 280 MG/DL (ref 37–140)
VLDLC SERPL CALC-MCNC: 56 MG/DL
WBC # BLD AUTO: 6.86 X10(3)/MCL (ref 4.5–11.5)

## 2024-09-09 PROCEDURE — 83036 HEMOGLOBIN GLYCOSYLATED A1C: CPT

## 2024-09-09 PROCEDURE — 80053 COMPREHEN METABOLIC PANEL: CPT

## 2024-09-09 PROCEDURE — 85025 COMPLETE CBC W/AUTO DIFF WBC: CPT

## 2024-09-09 PROCEDURE — 80061 LIPID PANEL: CPT

## 2024-09-09 PROCEDURE — 36415 COLL VENOUS BLD VENIPUNCTURE: CPT

## 2024-12-09 ENCOUNTER — LAB VISIT (OUTPATIENT)
Dept: LAB | Facility: HOSPITAL | Age: 69
End: 2024-12-09
Attending: FAMILY MEDICINE
Payer: MEDICARE

## 2024-12-09 DIAGNOSIS — E11.65 INADEQUATELY CONTROLLED DIABETES MELLITUS: ICD-10-CM

## 2024-12-09 DIAGNOSIS — I11.9 MALIGNANT HYPERTENSIVE HEART DISEASE WITHOUT HEART FAILURE: Primary | ICD-10-CM

## 2024-12-09 LAB
ALBUMIN SERPL-MCNC: 3.4 G/DL (ref 3.4–4.8)
ALBUMIN/GLOB SERPL: 1 RATIO (ref 1.1–2)
ALP SERPL-CCNC: 73 UNIT/L (ref 40–150)
ALT SERPL-CCNC: 18 UNIT/L (ref 0–55)
ANION GAP SERPL CALC-SCNC: 9 MEQ/L
AST SERPL-CCNC: 17 UNIT/L (ref 5–34)
BASOPHILS # BLD AUTO: 0.06 X10(3)/MCL
BASOPHILS NFR BLD AUTO: 0.9 %
BILIRUB SERPL-MCNC: 0.7 MG/DL
BUN SERPL-MCNC: 21 MG/DL (ref 9.8–20.1)
CALCIUM SERPL-MCNC: 9.9 MG/DL (ref 8.4–10.2)
CHLORIDE SERPL-SCNC: 106 MMOL/L (ref 98–107)
CO2 SERPL-SCNC: 26 MMOL/L (ref 23–31)
CREAT SERPL-MCNC: 0.98 MG/DL (ref 0.55–1.02)
CREAT UR-MCNC: 82.8 MG/DL (ref 45–106)
CREAT/UREA NIT SERPL: 21
EOSINOPHIL # BLD AUTO: 0.16 X10(3)/MCL (ref 0–0.9)
EOSINOPHIL NFR BLD AUTO: 2.3 %
ERYTHROCYTE [DISTWIDTH] IN BLOOD BY AUTOMATED COUNT: 14.9 % (ref 11.5–17)
EST. AVERAGE GLUCOSE BLD GHB EST-MCNC: 145.6 MG/DL
GFR SERPLBLD CREATININE-BSD FMLA CKD-EPI: >60 ML/MIN/1.73/M2
GLOBULIN SER-MCNC: 3.4 GM/DL (ref 2.4–3.5)
GLUCOSE SERPL-MCNC: 179 MG/DL (ref 82–115)
HBA1C MFR BLD: 6.7 %
HCT VFR BLD AUTO: 37.9 % (ref 37–47)
HGB BLD-MCNC: 11.9 G/DL (ref 12–16)
IMM GRANULOCYTES # BLD AUTO: 0.02 X10(3)/MCL (ref 0–0.04)
IMM GRANULOCYTES NFR BLD AUTO: 0.3 %
LYMPHOCYTES # BLD AUTO: 1.63 X10(3)/MCL (ref 0.6–4.6)
LYMPHOCYTES NFR BLD AUTO: 23.2 %
MCH RBC QN AUTO: 27.9 PG (ref 27–31)
MCHC RBC AUTO-ENTMCNC: 31.4 G/DL (ref 33–36)
MCV RBC AUTO: 89 FL (ref 80–94)
MICROALBUMIN UR-MCNC: 139.5 UG/ML
MICROALBUMIN/CREAT RATIO PNL UR: 168.5 MG/GM CR (ref 0–30)
MONOCYTES # BLD AUTO: 0.31 X10(3)/MCL (ref 0.1–1.3)
MONOCYTES NFR BLD AUTO: 4.4 %
NEUTROPHILS # BLD AUTO: 4.84 X10(3)/MCL (ref 2.1–9.2)
NEUTROPHILS NFR BLD AUTO: 68.9 %
PLATELET # BLD AUTO: 180 X10(3)/MCL (ref 130–400)
PMV BLD AUTO: 11.5 FL (ref 7.4–10.4)
POTASSIUM SERPL-SCNC: 3.9 MMOL/L (ref 3.5–5.1)
PROT SERPL-MCNC: 6.8 GM/DL (ref 5.8–7.6)
RBC # BLD AUTO: 4.26 X10(6)/MCL (ref 4.2–5.4)
SODIUM SERPL-SCNC: 141 MMOL/L (ref 136–145)
WBC # BLD AUTO: 7.02 X10(3)/MCL (ref 4.5–11.5)

## 2024-12-09 PROCEDURE — 82570 ASSAY OF URINE CREATININE: CPT

## 2024-12-09 PROCEDURE — 83036 HEMOGLOBIN GLYCOSYLATED A1C: CPT

## 2024-12-09 PROCEDURE — 85025 COMPLETE CBC W/AUTO DIFF WBC: CPT

## 2024-12-09 PROCEDURE — 80053 COMPREHEN METABOLIC PANEL: CPT

## 2024-12-09 PROCEDURE — 36415 COLL VENOUS BLD VENIPUNCTURE: CPT

## 2025-03-24 ENCOUNTER — HOSPITAL ENCOUNTER (INPATIENT)
Facility: HOSPITAL | Age: 70
LOS: 3 days | Discharge: HOME | DRG: 690 | End: 2025-03-27
Attending: INTERNAL MEDICINE | Admitting: FAMILY MEDICINE
Payer: MEDICARE

## 2025-03-24 DIAGNOSIS — R53.1 GENERALIZED WEAKNESS: ICD-10-CM

## 2025-03-24 DIAGNOSIS — N12 PYELONEPHRITIS: Primary | ICD-10-CM

## 2025-03-24 DIAGNOSIS — N17.9 ACUTE KIDNEY INJURY: ICD-10-CM

## 2025-03-24 PROBLEM — N30.00 ACUTE CYSTITIS WITHOUT HEMATURIA: Status: ACTIVE | Noted: 2025-03-24

## 2025-03-24 PROBLEM — E87.20 METABOLIC ACIDOSIS: Status: ACTIVE | Noted: 2025-03-24

## 2025-03-24 LAB
ALBUMIN SERPL-MCNC: 2.3 G/DL (ref 3.4–4.8)
ALBUMIN/GLOB SERPL: 0.6 RATIO (ref 1.1–2)
ALP SERPL-CCNC: 93 UNIT/L (ref 40–150)
ALT SERPL-CCNC: 16 UNIT/L (ref 0–55)
AMORPH URATE CRY URNS QL MICRO: ABNORMAL /HPF
ANION GAP SERPL CALC-SCNC: 15 MEQ/L
AST SERPL-CCNC: 18 UNIT/L (ref 11–45)
BACTERIA #/AREA URNS AUTO: ABNORMAL /HPF
BASOPHILS # BLD AUTO: 0.03 X10(3)/MCL
BASOPHILS NFR BLD AUTO: 0.2 %
BILIRUB SERPL-MCNC: 1.3 MG/DL
BILIRUB UR QL STRIP.AUTO: ABNORMAL
BNP BLD-MCNC: 62.5 PG/ML
BUN SERPL-MCNC: 42 MG/DL (ref 9.8–20.1)
CALCIUM SERPL-MCNC: 9.6 MG/DL (ref 8.4–10.2)
CHLORIDE SERPL-SCNC: 99 MMOL/L (ref 98–107)
CLARITY UR: ABNORMAL
CO2 SERPL-SCNC: 18 MMOL/L (ref 23–31)
COLOR UR AUTO: ABNORMAL
CREAT SERPL-MCNC: 1.62 MG/DL (ref 0.55–1.02)
CREAT/UREA NIT SERPL: 26
EOSINOPHIL # BLD AUTO: 0.01 X10(3)/MCL (ref 0–0.9)
EOSINOPHIL NFR BLD AUTO: 0.1 %
ERYTHROCYTE [DISTWIDTH] IN BLOOD BY AUTOMATED COUNT: 15 % (ref 11.5–17)
FLUAV AG UPPER RESP QL IA.RAPID: NOT DETECTED
FLUBV AG UPPER RESP QL IA.RAPID: NOT DETECTED
GFR SERPLBLD CREATININE-BSD FMLA CKD-EPI: 34 ML/MIN/1.73/M2
GLOBULIN SER-MCNC: 4 GM/DL (ref 2.4–3.5)
GLUCOSE SERPL-MCNC: 376 MG/DL (ref 82–115)
GLUCOSE UR QL STRIP: NEGATIVE
HCT VFR BLD AUTO: 34.1 % (ref 37–47)
HGB BLD-MCNC: 10.8 G/DL (ref 12–16)
HGB UR QL STRIP: ABNORMAL
IMM GRANULOCYTES # BLD AUTO: 0.11 X10(3)/MCL (ref 0–0.04)
IMM GRANULOCYTES NFR BLD AUTO: 0.9 %
KETONES UR QL STRIP: ABNORMAL
LACTATE SERPL-SCNC: 1.2 MMOL/L (ref 0.5–2.2)
LEUKOCYTE ESTERASE UR QL STRIP: ABNORMAL
LIPASE SERPL-CCNC: 41 U/L
LYMPHOCYTES # BLD AUTO: 0.54 X10(3)/MCL (ref 0.6–4.6)
LYMPHOCYTES NFR BLD AUTO: 4.2 %
MCH RBC QN AUTO: 26.9 PG (ref 27–31)
MCHC RBC AUTO-ENTMCNC: 31.7 G/DL (ref 33–36)
MCV RBC AUTO: 85 FL (ref 80–94)
MONOCYTES # BLD AUTO: 0.87 X10(3)/MCL (ref 0.1–1.3)
MONOCYTES NFR BLD AUTO: 6.7 %
NEUTROPHILS # BLD AUTO: 11.35 X10(3)/MCL (ref 2.1–9.2)
NEUTROPHILS NFR BLD AUTO: 87.9 %
NITRITE UR QL STRIP: NEGATIVE
NRBC BLD AUTO-RTO: 0 %
PH UR STRIP: 6 [PH]
PLATELET # BLD AUTO: 150 X10(3)/MCL (ref 130–400)
PMV BLD AUTO: 12.8 FL (ref 7.4–10.4)
POCT GLUCOSE: 368 MG/DL (ref 70–110)
POTASSIUM SERPL-SCNC: 4.5 MMOL/L (ref 3.5–5.1)
PROT SERPL-MCNC: 6.3 GM/DL (ref 5.8–7.6)
PROT UR QL STRIP: ABNORMAL
RBC # BLD AUTO: 4.01 X10(6)/MCL (ref 4.2–5.4)
RBC #/AREA URNS AUTO: ABNORMAL /HPF
RSV A 5' UTR RNA NPH QL NAA+PROBE: NOT DETECTED
SARS-COV-2 RNA RESP QL NAA+PROBE: NOT DETECTED
SODIUM SERPL-SCNC: 132 MMOL/L (ref 136–145)
SP GR UR STRIP.AUTO: 1.01 (ref 1–1.03)
SQUAMOUS #/AREA URNS AUTO: ABNORMAL /HPF
TROPONIN I SERPL-MCNC: <0.01 NG/ML (ref 0–0.04)
TSH SERPL-ACNC: 1.69 UIU/ML (ref 0.35–4.94)
UROBILINOGEN UR STRIP-ACNC: 2
WBC # BLD AUTO: 12.91 X10(3)/MCL (ref 4.5–11.5)
WBC #/AREA URNS AUTO: ABNORMAL /HPF

## 2025-03-24 PROCEDURE — 11000001 HC ACUTE MED/SURG PRIVATE ROOM

## 2025-03-24 PROCEDURE — 96360 HYDRATION IV INFUSION INIT: CPT

## 2025-03-24 PROCEDURE — 0241U COVID/RSV/FLU A&B PCR: CPT | Performed by: INTERNAL MEDICINE

## 2025-03-24 PROCEDURE — 25000003 PHARM REV CODE 250: Performed by: INTERNAL MEDICINE

## 2025-03-24 PROCEDURE — 84443 ASSAY THYROID STIM HORMONE: CPT | Performed by: INTERNAL MEDICINE

## 2025-03-24 PROCEDURE — 87040 BLOOD CULTURE FOR BACTERIA: CPT | Performed by: INTERNAL MEDICINE

## 2025-03-24 PROCEDURE — 83690 ASSAY OF LIPASE: CPT | Performed by: INTERNAL MEDICINE

## 2025-03-24 PROCEDURE — 94761 N-INVAS EAR/PLS OXIMETRY MLT: CPT

## 2025-03-24 PROCEDURE — 80053 COMPREHEN METABOLIC PANEL: CPT | Performed by: INTERNAL MEDICINE

## 2025-03-24 PROCEDURE — 83880 ASSAY OF NATRIURETIC PEPTIDE: CPT | Performed by: INTERNAL MEDICINE

## 2025-03-24 PROCEDURE — 99285 EMERGENCY DEPT VISIT HI MDM: CPT | Mod: 25

## 2025-03-24 PROCEDURE — 63600175 PHARM REV CODE 636 W HCPCS: Performed by: INTERNAL MEDICINE

## 2025-03-24 PROCEDURE — 21400001 HC TELEMETRY ROOM

## 2025-03-24 PROCEDURE — 25500020 PHARM REV CODE 255: Performed by: INTERNAL MEDICINE

## 2025-03-24 PROCEDURE — 85025 COMPLETE CBC W/AUTO DIFF WBC: CPT | Performed by: INTERNAL MEDICINE

## 2025-03-24 PROCEDURE — 83605 ASSAY OF LACTIC ACID: CPT | Performed by: INTERNAL MEDICINE

## 2025-03-24 PROCEDURE — 63600175 PHARM REV CODE 636 W HCPCS: Performed by: FAMILY MEDICINE

## 2025-03-24 PROCEDURE — 81001 URINALYSIS AUTO W/SCOPE: CPT | Performed by: INTERNAL MEDICINE

## 2025-03-24 PROCEDURE — 84484 ASSAY OF TROPONIN QUANT: CPT | Performed by: INTERNAL MEDICINE

## 2025-03-24 PROCEDURE — 87086 URINE CULTURE/COLONY COUNT: CPT | Performed by: INTERNAL MEDICINE

## 2025-03-24 RX ORDER — ONDANSETRON HYDROCHLORIDE 2 MG/ML
4 INJECTION, SOLUTION INTRAVENOUS EVERY 4 HOURS PRN
Status: DISCONTINUED | OUTPATIENT
Start: 2025-03-24 | End: 2025-03-27 | Stop reason: HOSPADM

## 2025-03-24 RX ORDER — CEFTRIAXONE 1 G/1
1 INJECTION, POWDER, FOR SOLUTION INTRAMUSCULAR; INTRAVENOUS
Status: DISCONTINUED | OUTPATIENT
Start: 2025-03-24 | End: 2025-03-27 | Stop reason: HOSPADM

## 2025-03-24 RX ORDER — IBUPROFEN 200 MG
24 TABLET ORAL
Status: DISCONTINUED | OUTPATIENT
Start: 2025-03-24 | End: 2025-03-27 | Stop reason: HOSPADM

## 2025-03-24 RX ORDER — ATORVASTATIN CALCIUM 40 MG/1
40 TABLET, FILM COATED ORAL DAILY
Status: DISCONTINUED | OUTPATIENT
Start: 2025-03-25 | End: 2025-03-27 | Stop reason: HOSPADM

## 2025-03-24 RX ORDER — INSULIN ASPART 100 [IU]/ML
0-10 INJECTION, SOLUTION INTRAVENOUS; SUBCUTANEOUS
Status: DISCONTINUED | OUTPATIENT
Start: 2025-03-24 | End: 2025-03-27 | Stop reason: HOSPADM

## 2025-03-24 RX ORDER — IOPAMIDOL 755 MG/ML
100 INJECTION, SOLUTION INTRAVASCULAR
Status: COMPLETED | OUTPATIENT
Start: 2025-03-24 | End: 2025-03-24

## 2025-03-24 RX ORDER — ENOXAPARIN SODIUM 100 MG/ML
40 INJECTION SUBCUTANEOUS EVERY 12 HOURS
Status: DISCONTINUED | OUTPATIENT
Start: 2025-03-24 | End: 2025-03-27 | Stop reason: HOSPADM

## 2025-03-24 RX ORDER — GLUCAGON 1 MG
1 KIT INJECTION
Status: DISCONTINUED | OUTPATIENT
Start: 2025-03-24 | End: 2025-03-27 | Stop reason: HOSPADM

## 2025-03-24 RX ORDER — ONDANSETRON HYDROCHLORIDE 2 MG/ML
4 INJECTION, SOLUTION INTRAVENOUS EVERY 8 HOURS PRN
Status: DISCONTINUED | OUTPATIENT
Start: 2025-03-24 | End: 2025-03-24

## 2025-03-24 RX ORDER — SODIUM CHLORIDE 9 MG/ML
1000 INJECTION, SOLUTION INTRAVENOUS CONTINUOUS
Status: ACTIVE | OUTPATIENT
Start: 2025-03-24 | End: 2025-03-25

## 2025-03-24 RX ORDER — IBUPROFEN 200 MG
16 TABLET ORAL
Status: DISCONTINUED | OUTPATIENT
Start: 2025-03-24 | End: 2025-03-27 | Stop reason: HOSPADM

## 2025-03-24 RX ORDER — SERTRALINE HYDROCHLORIDE 50 MG/1
100 TABLET, FILM COATED ORAL DAILY
Status: DISCONTINUED | OUTPATIENT
Start: 2025-03-25 | End: 2025-03-27 | Stop reason: HOSPADM

## 2025-03-24 RX ORDER — ASPIRIN 81 MG/1
81 TABLET ORAL DAILY
Status: DISCONTINUED | OUTPATIENT
Start: 2025-03-25 | End: 2025-03-27 | Stop reason: HOSPADM

## 2025-03-24 RX ORDER — ACETAMINOPHEN 325 MG/1
650 TABLET ORAL EVERY 8 HOURS PRN
Status: DISCONTINUED | OUTPATIENT
Start: 2025-03-24 | End: 2025-03-24

## 2025-03-24 RX ORDER — ACETAMINOPHEN 325 MG/1
650 TABLET ORAL EVERY 4 HOURS PRN
Status: DISCONTINUED | OUTPATIENT
Start: 2025-03-24 | End: 2025-03-27 | Stop reason: HOSPADM

## 2025-03-24 RX ORDER — SODIUM CHLORIDE 0.9 % (FLUSH) 0.9 %
10 SYRINGE (ML) INJECTION EVERY 8 HOURS
Status: DISCONTINUED | OUTPATIENT
Start: 2025-03-24 | End: 2025-03-27 | Stop reason: HOSPADM

## 2025-03-24 RX ADMIN — INSULIN ASPART 5 UNITS: 100 INJECTION, SOLUTION INTRAVENOUS; SUBCUTANEOUS at 09:03

## 2025-03-24 RX ADMIN — SODIUM CHLORIDE 917 ML: 9 INJECTION, SOLUTION INTRAVENOUS at 03:03

## 2025-03-24 RX ADMIN — SODIUM CHLORIDE 1000 ML: 9 INJECTION, SOLUTION INTRAVENOUS at 12:03

## 2025-03-24 RX ADMIN — CEFTRIAXONE SODIUM 1 G: 1 INJECTION, POWDER, FOR SOLUTION INTRAMUSCULAR; INTRAVENOUS at 03:03

## 2025-03-24 RX ADMIN — IOPAMIDOL 100 ML: 755 INJECTION, SOLUTION INTRAVENOUS at 12:03

## 2025-03-24 RX ADMIN — ENOXAPARIN SODIUM 40 MG: 40 INJECTION SUBCUTANEOUS at 09:03

## 2025-03-24 NOTE — H&P
HISTORY & PHYSICAL  Hospital Medicine    Team: Networked reference to record PCT     PRESENTING HISTORY     Chief Complaint/Reason for Admission:  Weakness    History of Present Illness:  Ms. Vi Sultana is a 69 y.o. female who presented with weakness for 6 days.  Her daughter made her come to the hospital.  She has not had much of an appetite.  She denies vomiting.  She denies abdominal pain.  She feels better since getting IV fluids in the emergency room.  She was found to have a UTI in the ER and started on Rocephin.  She was mildly hypotensive lactic acid was not elevated.      Review of Systems:  Review of Systems   Constitutional:  Positive for malaise/fatigue. Negative for chills, diaphoresis, fever and weight loss.   HENT:  Negative for congestion, sinus pain and sore throat.    Eyes:  Negative for blurred vision, double vision, photophobia and pain.   Respiratory:  Negative for cough, sputum production, shortness of breath, wheezing and stridor.    Cardiovascular:  Negative for chest pain, palpitations and orthopnea.   Gastrointestinal:  Positive for nausea. Negative for abdominal pain, constipation, diarrhea and vomiting.   Genitourinary:  Positive for dysuria. Negative for frequency, hematuria and urgency.   Musculoskeletal: Negative.    Skin:  Negative for rash.   Neurological: Negative.  Negative for dizziness.   Endo/Heme/Allergies: Negative.    Psychiatric/Behavioral: Negative.         PAST HISTORY:     Past Medical History:   Diagnosis Date    Diabetes mellitus     Digestive disorder     Renal disorder        Past Surgical History:   Procedure Laterality Date    COLONOSCOPY N/A 2/7/2023    Procedure: COLON;  Surgeon: Simin Cruz MD;  Location: [facility] ENDOSCOPY;  Service: Gastroenterology;  Laterality: N/A;    COLONOSCOPY  2/7/2023    Procedure: COLONOSCOPY, WITH HEMORRHAGE CONTROL;  Surgeon: Simin Cruz MD;  Location: [facility] ENDOSCOPY;  Service: Gastroenterology;;    COLONOSCOPY N/A  8/21/2023    Procedure: 1000 COLON;  Surgeon: Simin Cruz MD;  Location: [facility] ENDOSCOPY;  Service: Gastroenterology;  Laterality: N/A;    COLONOSCOPY  8/21/2023    Procedure: COLONOSCOPY, WITH HEMORRHAGE CONTROL;  Surgeon: Simin Cruz MD;  Location: [facility] ENDOSCOPY;  Service: Gastroenterology;;  epi 1 cc    COLONOSCOPY, WITH 1 OR MORE BIOPSIES  2/7/2023    Procedure: COLONOSCOPY, WITH 1 OR MORE BIOPSIES;  Surgeon: Simin Cruz MD;  Location: [facility] ENDOSCOPY;  Service: Gastroenterology;;    COLONOSCOPY, WITH POLYPECTOMY USING SNARE  2/7/2023    Procedure: COLONOSCOPY, WITH POLYPECTOMY USING SNARE;  Surgeon: Simin Cruz MD;  Location: [facility] ENDOSCOPY;  Service: Gastroenterology;;    COLONOSCOPY, WITH POLYPECTOMY USING SNARE  8/21/2023    Procedure: COLONOSCOPY, WITH POLYPECTOMY USING SNARE;  Surgeon: Simin Cruz MD;  Location: [facility] ENDOSCOPY;  Service: Gastroenterology;;       No family history on file.    Social History     Socioeconomic History    Marital status:    Tobacco Use    Smoking status: Never    Smokeless tobacco: Never   Substance and Sexual Activity    Alcohol use: Not Currently     Social Drivers of Health     Financial Resource Strain: Low Risk  (3/24/2025)    Overall Financial Resource Strain (CARDIA)     Difficulty of Paying Living Expenses: Not hard at all   Food Insecurity: No Food Insecurity (3/24/2025)    Hunger Vital Sign     Worried About Running Out of Food in the Last Year: Never true     Ran Out of Food in the Last Year: Never true   Transportation Needs: No Transportation Needs (3/24/2025)    PRAPARE - Transportation     Lack of Transportation (Medical): No     Lack of Transportation (Non-Medical): No   Stress: No Stress Concern Present (3/24/2025)    [employer] - Occupational Stress Questionnaire     Feeling of Stress : Not at all   Housing Stability: Low Risk  (3/24/2025)    Housing Stability Vital Sign     Unable to Pay  for Housing in the Last Year: No     Homeless in the Last Year: No       MEDICATIONS & ALLERGIES:     Medications Ordered Prior to Encounter[1]     Review of patient's allergies indicates:   Allergen Reactions    Opioids - morphine analogues Itching    Avocado (laurus persea)        OBJECTIVE:     Vital Signs:  Temp:  [97.7 °F (36.5 °C)-98.2 °F (36.8 °C)] 98.2 °F (36.8 °C)  Pulse:  [] 97  Resp:  [18] 18  SpO2:  [94 %-96 %] 95 %  BP: ()/(54-78) 107/65  Body mass index is 43.64 kg/m².     Physical Exam:  Physical Exam   Constitutional: She is oriented to person, place, and time. She appears well-developed and well-nourished. No distress.   obese She appears ill.   HENT:   Head: Normocephalic.   Right Ear: External ear normal.   Left Ear: External ear normal.   Head: Dry mucous membranes  Eyes: Pupils are equal, round, and reactive to light.   Neck: No tracheal deviation present. No thyromegaly present.   Cardiovascular: Normal rate, regular rhythm and normal heart sounds. Exam reveals no friction rub.   No murmur heard.Pulmonary:      Effort: Pulmonary effort is normal.      Breath sounds: Normal breath sounds.     Abdominal: Soft. She exhibits no distension and no mass. There is no rebound and no guarding.   Musculoskeletal:         General: No tenderness or deformity.   Neurological: She is alert and oriented to person, place, and time. No cranial nerve deficit. She exhibits normal muscle tone. Coordination normal.   Skin: Skin is warm and dry. Capillary refill takes less than 2 seconds. She is not diaphoretic. No erythema.   Psychiatric: Her behavior is normal. Judgment and thought content normal.       Laboratory  Recent Results (from the past 24 hours)   COVID/RSV/FLU A&B PCR    Collection Time: 03/24/25 11:27 AM   Result Value Ref Range    Influenza A PCR Not Detected Not Detected    Influenza B PCR Not Detected Not Detected    Respiratory Syncytial Virus PCR Not Detected Not Detected    SARS-CoV-2  PCR Not Detected Not Detected, Negative   Comprehensive metabolic panel    Collection Time: 03/24/25 11:56 AM   Result Value Ref Range    Sodium 132 (L) 136 - 145 mmol/L    Potassium 4.5 3.5 - 5.1 mmol/L    Chloride 99 98 - 107 mmol/L    CO2 18 (L) 23 - 31 mmol/L    Glucose 376 (H) 82 - 115 mg/dL    Blood Urea Nitrogen 42.0 (H) 9.8 - 20.1 mg/dL    Creatinine 1.62 (H) 0.55 - 1.02 mg/dL    Calcium 9.6 8.4 - 10.2 mg/dL    Protein Total 6.3 5.8 - 7.6 gm/dL    Albumin 2.3 (L) 3.4 - 4.8 g/dL    Globulin 4.0 (H) 2.4 - 3.5 gm/dL    Albumin/Globulin Ratio 0.6 (L) 1.1 - 2.0 ratio    Bilirubin Total 1.3 <=1.5 mg/dL    ALP 93 40 - 150 unit/L    ALT 16 0 - 55 unit/L    AST 18 11 - 45 unit/L    eGFR 34 mL/min/1.73/m2    Anion Gap 15.0 mEq/L    BUN/Creatinine Ratio 26    Troponin I    Collection Time: 03/24/25 11:56 AM   Result Value Ref Range    Troponin-I <0.010 0.000 - 0.045 ng/mL   Brain natriuretic peptide    Collection Time: 03/24/25 11:56 AM   Result Value Ref Range    Natriuretic Peptide 62.5 <=100.0 pg/mL   CBC with Differential    Collection Time: 03/24/25 11:56 AM   Result Value Ref Range    WBC 12.91 (H) 4.50 - 11.50 x10(3)/mcL    RBC 4.01 (L) 4.20 - 5.40 x10(6)/mcL    Hgb 10.8 (L) 12.0 - 16.0 g/dL    Hct 34.1 (L) 37.0 - 47.0 %    MCV 85.0 80.0 - 94.0 fL    MCH 26.9 (L) 27.0 - 31.0 pg    MCHC 31.7 (L) 33.0 - 36.0 g/dL    RDW 15.0 11.5 - 17.0 %    Platelet 150 130 - 400 x10(3)/mcL    MPV 12.8 (H) 7.4 - 10.4 fL    Neut % 87.9 %    Lymph % 4.2 %    Mono % 6.7 %    Eos % 0.1 %    Basophil % 0.2 %    Imm Grans % 0.9 %    Neut # 11.35 (H) 2.1 - 9.2 x10(3)/mcL    Lymph # 0.54 (L) 0.6 - 4.6 x10(3)/mcL    Mono # 0.87 0.1 - 1.3 x10(3)/mcL    Eos # 0.01 0 - 0.9 x10(3)/mcL    Baso # 0.03 <=0.2 x10(3)/mcL    Imm Gran # 0.11 (H) 0.00 - 0.04 x10(3)/mcL    NRBC% 0.0 %   TSH    Collection Time: 03/24/25 11:56 AM   Result Value Ref Range    TSH 1.694 0.350 - 4.940 uIU/mL   Lipase    Collection Time: 03/24/25 11:56 AM   Result Value Ref  Range    Lipase Level 41 <=60 U/L   Urinalysis, Reflex to Urine Culture    Collection Time: 03/24/25  2:31 PM    Specimen: Urine   Result Value Ref Range    Color, UA Dark Yellow Yellow, Light-Yellow, Dark Yellow, Ericka, Straw    Appearance, UA Cloudy (A) Clear    Specific Gravity, UA 1.015 1.005 - 1.030    pH, UA 6.0 5.0 - 8.5    Protein, UA 3+ (A) Negative    Glucose, UA Negative Negative, Normal    Ketones, UA 1+ (A) Negative    Blood, UA 3+ (A) Negative    Bilirubin, UA 1+ (A) Negative    Urobilinogen, UA 2.0 (A) 0.2, 1.0, Normal    Nitrites, UA Negative Negative    Leukocyte Esterase, UA 3+ (A) Negative   Urinalysis, Microscopic    Collection Time: 03/24/25  2:31 PM   Result Value Ref Range    Bacteria, UA Few (A) None Seen, Rare, Occasional /HPF    Amporphous Urate Crystals, UA Moderate (A) None Seen /HPF    RBC, UA 6-10 (A) None Seen, 0-2, 3-5, 0-5 /HPF    WBC, UA 11-20 (A) None Seen, 0-2, 3-5, 0-5 /HPF    Squamous Epithelial Cells, UA Few (A) None Seen, Rare, Occasional, Occ /HPF   Lactic acid, plasma    Collection Time: 03/24/25  3:35 PM   Result Value Ref Range    Lactic Acid Level 1.2 0.5 - 2.2 mmol/L       ASSESSMENT & PLAN:     Current Problems List:  Active Hospital Problems    Diagnosis  POA    Acute cystitis without hematuria [N30.00]  Yes    Metabolic acidosis [E87.20]  No    Diabetes mellitus [E11.9]  Yes    Renal disorder [N28.9]  Yes      Resolved Hospital Problems   No resolved problems to display.         Problem Assessment & Treatment Plan:    Follow up urine and blood cultures  Will continue iv fluids  Insulin sliding scale, hold victoza  Continue sertraline  Lovenox for dvt ppx  Clear liquid diet        Signing Physician:  Teresa Santana MD         [1]   No current facility-administered medications on file prior to encounter.     Current Outpatient Medications on File Prior to Encounter   Medication Sig Dispense Refill    allopurinoL (ZYLOPRIM) 100 MG tablet Take 100 mg by mouth once  daily.      aspirin (ECOTRIN) 81 MG EC tablet Take 81 mg by mouth once daily.      ferrous gluconate (FERGON) 324 MG tablet Take 324 mg by mouth daily with breakfast.      furosemide (LASIX) 40 MG tablet Take 40 mg by mouth 2 (two) times daily.      liraglutide 0.6 mg/0.1 mL, 18 mg/3 mL, subq PNIJ (VICTOZA 2-VIKRAM) 0.6 mg/0.1 mL (18 mg/3 mL) PnIj pen Inject 0.6 mg into the skin once daily.      rosuvastatin (CRESTOR) 40 MG Tab Take 10 mg by mouth every evening.      sertraline (ZOLOFT) 100 MG tablet Take 100 mg by mouth once daily.
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
## 2025-03-25 LAB
ALBUMIN SERPL-MCNC: 2 G/DL (ref 3.4–4.8)
ALBUMIN/GLOB SERPL: 0.6 RATIO (ref 1.1–2)
ALP SERPL-CCNC: 76 UNIT/L (ref 40–150)
ALT SERPL-CCNC: 21 UNIT/L (ref 0–55)
ANION GAP SERPL CALC-SCNC: 8 MEQ/L
AST SERPL-CCNC: 32 UNIT/L (ref 11–45)
BASOPHILS # BLD AUTO: 0.03 X10(3)/MCL
BASOPHILS NFR BLD AUTO: 0.4 %
BILIRUB SERPL-MCNC: 0.5 MG/DL
BUN SERPL-MCNC: 43 MG/DL (ref 9.8–20.1)
CALCIUM SERPL-MCNC: 8.8 MG/DL (ref 8.4–10.2)
CHLORIDE SERPL-SCNC: 104 MMOL/L (ref 98–107)
CO2 SERPL-SCNC: 21 MMOL/L (ref 23–31)
CREAT SERPL-MCNC: 1.4 MG/DL (ref 0.55–1.02)
CREAT/UREA NIT SERPL: 31
EOSINOPHIL # BLD AUTO: 0.07 X10(3)/MCL (ref 0–0.9)
EOSINOPHIL NFR BLD AUTO: 0.9 %
ERYTHROCYTE [DISTWIDTH] IN BLOOD BY AUTOMATED COUNT: 15.2 % (ref 11.5–17)
GFR SERPLBLD CREATININE-BSD FMLA CKD-EPI: 41 ML/MIN/1.73/M2
GLOBULIN SER-MCNC: 3.4 GM/DL (ref 2.4–3.5)
GLUCOSE SERPL-MCNC: 183 MG/DL (ref 82–115)
HCT VFR BLD AUTO: 30.6 % (ref 37–47)
HGB BLD-MCNC: 9.2 G/DL (ref 12–16)
IMM GRANULOCYTES # BLD AUTO: 0.04 X10(3)/MCL (ref 0–0.04)
IMM GRANULOCYTES NFR BLD AUTO: 0.5 %
LYMPHOCYTES # BLD AUTO: 1.06 X10(3)/MCL (ref 0.6–4.6)
LYMPHOCYTES NFR BLD AUTO: 13.6 %
MAGNESIUM SERPL-MCNC: 2.2 MG/DL (ref 1.6–2.6)
MCH RBC QN AUTO: 27 PG (ref 27–31)
MCHC RBC AUTO-ENTMCNC: 30.1 G/DL (ref 33–36)
MCV RBC AUTO: 89.7 FL (ref 80–94)
MONOCYTES # BLD AUTO: 0.6 X10(3)/MCL (ref 0.1–1.3)
MONOCYTES NFR BLD AUTO: 7.7 %
NEUTROPHILS # BLD AUTO: 6.02 X10(3)/MCL (ref 2.1–9.2)
NEUTROPHILS NFR BLD AUTO: 76.9 %
NRBC BLD AUTO-RTO: 0 %
PHOSPHATE SERPL-MCNC: 3.3 MG/DL (ref 2.3–4.7)
PLATELET # BLD AUTO: 141 X10(3)/MCL (ref 130–400)
PMV BLD AUTO: 12.4 FL (ref 7.4–10.4)
POCT GLUCOSE: 169 MG/DL (ref 70–110)
POCT GLUCOSE: 200 MG/DL (ref 70–110)
POCT GLUCOSE: 327 MG/DL (ref 70–110)
POTASSIUM SERPL-SCNC: 4 MMOL/L (ref 3.5–5.1)
PROT SERPL-MCNC: 5.4 GM/DL (ref 5.8–7.6)
RBC # BLD AUTO: 3.41 X10(6)/MCL (ref 4.2–5.4)
SODIUM SERPL-SCNC: 133 MMOL/L (ref 136–145)
WBC # BLD AUTO: 7.82 X10(3)/MCL (ref 4.5–11.5)

## 2025-03-25 PROCEDURE — 63600175 PHARM REV CODE 636 W HCPCS: Performed by: FAMILY MEDICINE

## 2025-03-25 PROCEDURE — 83735 ASSAY OF MAGNESIUM: CPT | Performed by: FAMILY MEDICINE

## 2025-03-25 PROCEDURE — 84100 ASSAY OF PHOSPHORUS: CPT | Performed by: FAMILY MEDICINE

## 2025-03-25 PROCEDURE — 25000003 PHARM REV CODE 250: Performed by: INTERNAL MEDICINE

## 2025-03-25 PROCEDURE — 94761 N-INVAS EAR/PLS OXIMETRY MLT: CPT

## 2025-03-25 PROCEDURE — 85025 COMPLETE CBC W/AUTO DIFF WBC: CPT | Performed by: FAMILY MEDICINE

## 2025-03-25 PROCEDURE — 80053 COMPREHEN METABOLIC PANEL: CPT | Performed by: FAMILY MEDICINE

## 2025-03-25 PROCEDURE — A4216 STERILE WATER/SALINE, 10 ML: HCPCS | Performed by: INTERNAL MEDICINE

## 2025-03-25 PROCEDURE — 25000003 PHARM REV CODE 250: Performed by: FAMILY MEDICINE

## 2025-03-25 PROCEDURE — 36415 COLL VENOUS BLD VENIPUNCTURE: CPT | Performed by: FAMILY MEDICINE

## 2025-03-25 PROCEDURE — 21400001 HC TELEMETRY ROOM

## 2025-03-25 PROCEDURE — 63600175 PHARM REV CODE 636 W HCPCS: Performed by: INTERNAL MEDICINE

## 2025-03-25 PROCEDURE — 99900035 HC TECH TIME PER 15 MIN (STAT)

## 2025-03-25 RX ORDER — ARIPIPRAZOLE 10 MG/1
10 TABLET ORAL DAILY
COMMUNITY

## 2025-03-25 RX ORDER — ARIPIPRAZOLE 5 MG/1
10 TABLET ORAL DAILY
Status: DISCONTINUED | OUTPATIENT
Start: 2025-03-25 | End: 2025-03-27 | Stop reason: HOSPADM

## 2025-03-25 RX ORDER — ALLOPURINOL 100 MG/1
100 TABLET ORAL DAILY
Status: DISCONTINUED | OUTPATIENT
Start: 2025-03-25 | End: 2025-03-27 | Stop reason: HOSPADM

## 2025-03-25 RX ADMIN — SERTRALINE HYDROCHLORIDE 100 MG: 50 TABLET ORAL at 08:03

## 2025-03-25 RX ADMIN — ATORVASTATIN CALCIUM 40 MG: 40 TABLET, FILM COATED ORAL at 08:03

## 2025-03-25 RX ADMIN — INSULIN ASPART 8 UNITS: 100 INJECTION, SOLUTION INTRAVENOUS; SUBCUTANEOUS at 03:03

## 2025-03-25 RX ADMIN — ALLOPURINOL 100 MG: 100 TABLET ORAL at 11:03

## 2025-03-25 RX ADMIN — ASPIRIN 81 MG: 81 TABLET, COATED ORAL at 08:03

## 2025-03-25 RX ADMIN — SODIUM CHLORIDE, PRESERVATIVE FREE 10 ML: 5 INJECTION INTRAVENOUS at 10:03

## 2025-03-25 RX ADMIN — SODIUM CHLORIDE 1000 ML: 9 INJECTION, SOLUTION INTRAVENOUS at 12:03

## 2025-03-25 RX ADMIN — ARIPIPRAZOLE 10 MG: 5 TABLET ORAL at 11:03

## 2025-03-25 RX ADMIN — CEFTRIAXONE SODIUM 1 G: 1 INJECTION, POWDER, FOR SOLUTION INTRAMUSCULAR; INTRAVENOUS at 04:03

## 2025-03-25 RX ADMIN — ENOXAPARIN SODIUM 40 MG: 40 INJECTION SUBCUTANEOUS at 10:03

## 2025-03-25 RX ADMIN — ENOXAPARIN SODIUM 40 MG: 40 INJECTION SUBCUTANEOUS at 08:03

## 2025-03-25 RX ADMIN — SODIUM CHLORIDE, PRESERVATIVE FREE 10 ML: 5 INJECTION INTRAVENOUS at 12:03

## 2025-03-25 NOTE — PLAN OF CARE
03/25/25 1339   Discharge Assessment   Assessment Type Discharge Planning Assessment   Confirmed/corrected address, phone number and insurance Yes   Confirmed Demographics Correct on Facesheet   Source of Information health record;patient   People in Home alone   Do you expect to return to your current living situation? Yes   Do you have help at home or someone to help you manage your care at home? No   Prior to hospitilization cognitive status: Alert/Oriented;No Deficits   Current cognitive status: Alert/Oriented;No Deficits   Walking or Climbing Stairs Difficulty no   Dressing/Bathing Difficulty no   Equipment Currently Used at Home none   Patient currently being followed by outpatient case management? Unable to determine (comments)   Do you currently have service(s) that help you manage your care at home? No   Do you take prescription medications? Yes   Do you have prescription coverage? Yes   Do you have any problems affording any of your prescribed medications? No   Is the patient taking medications as prescribed? yes   How do you get to doctors appointments? family or friend will provide   Are you on dialysis? No   Do you take coumadin? No   Discharge Plan A Home   Discharge Plan B Home Health   DME Needed Upon Discharge  none   Discharge Plan discussed with: Patient   Transition of Care Barriers None   Physical Activity   On average, how many days per week do you engage in moderate to strenuous exercise (like a brisk walk)? 0 days   On average, how many minutes do you engage in exercise at this level? 0 min   Financial Resource Strain   How hard is it for you to pay for the very basics like food, housing, medical care, and heating? Not very   Housing Stability   In the last 12 months, was there a time when you were not able to pay the mortgage or rent on time? N   At any time in the past 12 months, were you homeless or living in a shelter (including now)? N   Transportation Needs   In the past 12 months,  has lack of transportation kept you from medical appointments or from getting medications? no   In the past 12 months, has lack of transportation kept you from meetings, work, or from getting things needed for daily living? No   Food Insecurity   Within the past 12 months, you worried that your food would run out before you got the money to buy more. Never true   Within the past 12 months, the food you bought just didn't last and you didn't have money to get more. Never true   Stress   Do you feel stress - tense, restless, nervous, or anxious, or unable to sleep at night because your mind is troubled all the time - these days? Only a littl   Social Isolation   How often do you feel lonely or isolated from those around you?  Rarely   Alcohol Use   Q1: How often do you have a drink containing alcohol? Never   Q2: How many drinks containing alcohol do you have on a typical day when you are drinking? None   Q3: How often do you have six or more drinks on one occasion? Never   Utilities   In the past 12 months has the electric, gas, oil, or water company threatened to shut off services in your home? No   Health Literacy   How often do you need to have someone help you when you read instructions, pamphlets, or other written material from your doctor or pharmacy? Sometimes

## 2025-03-25 NOTE — PROGRESS NOTES
Progress Note    Admit Date: 3/24/2025    DATE OF SERVICE 3/25/25         SUBJECTIVE:     Follow-up For:  Admitted for UTI and MYLA.  Doing better today . Appetite is better and she is asking to eat.  Blood culture negative, urine culture > 100k gnr, final pending.    Scheduled Meds:   aspirin  81 mg Oral Daily    atorvastatin  40 mg Oral Daily    cefTRIAXone  1 g Intravenous Q24H    enoxparin  40 mg Subcutaneous Q12H    sertraline  100 mg Oral Daily    sodium chloride 0.9%  10 mL Intravenous Q8H     Continuous Infusions:  PRN Meds:  Current Facility-Administered Medications:     acetaminophen, 650 mg, Oral, Q4H PRN    dextrose 50%, 12.5 g, Intravenous, PRN    dextrose 50%, 25 g, Intravenous, PRN    glucagon (human recombinant), 1 mg, Intramuscular, PRN    glucose, 16 g, Oral, PRN    glucose, 24 g, Oral, PRN    insulin aspart U-100, 0-10 Units, Subcutaneous, QID (AC + HS) PRN    ondansetron, 4 mg, Intravenous, Q4H PRN    Review of patient's allergies indicates:   Allergen Reactions    Opioids - morphine analogues Itching    Avocado (laurus persea)        Review of Systems  ROS    OBJECTIVE:     Vital Signs (Most Recent)  Temp: 98.1 °F (36.7 °C) (03/25/25 0837)  Pulse: 91 (03/25/25 0837)  Resp: 20 (03/25/25 0837)  BP: (!) 116/55 (03/25/25 0837)  SpO2: (!) 93 % (03/25/25 0837)    Vital Signs Range (Last 24H):  Temp:  [97.7 °F (36.5 °C)-98.2 °F (36.8 °C)]   Pulse:  []   Resp:  [18-20]   BP: ()/(51-78)   SpO2:  [93 %-99 %]     I & O (Last 24H):  Intake/Output Summary (Last 24 hours) at 3/25/2025 1000  Last data filed at 3/25/2025 0800  Gross per 24 hour   Intake 360 ml   Output --   Net 360 ml     Physical Exam:  Physical Exam   Constitutional: She is oriented to person, place, and time. She appears well-developed and well-nourished. No distress.   obese   HENT:   Head: Normocephalic.   Right Ear: External ear normal.   Left Ear: External ear normal.   Eyes: Pupils are equal, round, and reactive to light.    Neck: No tracheal deviation present. No thyromegaly present.   Cardiovascular: Normal rate, regular rhythm and normal heart sounds. Exam reveals no friction rub.   No murmur heard.Pulmonary:      Effort: Pulmonary effort is normal.      Breath sounds: Normal breath sounds.     Abdominal: She exhibits no distension and no mass. There is no rebound and no guarding.   Musculoskeletal:         General: No tenderness or deformity.   Neurological: She is alert and oriented to person, place, and time. No cranial nerve deficit. She exhibits normal muscle tone. Coordination normal.   Skin: Skin is warm and dry. Capillary refill takes less than 2 seconds. She is not diaphoretic. No erythema.   Psychiatric: Her behavior is normal. Judgment and thought content normal.        Laboratory:  Recent Results (from the past 24 hours)   COVID/RSV/FLU A&B PCR    Collection Time: 03/24/25 11:27 AM   Result Value Ref Range    Influenza A PCR Not Detected Not Detected    Influenza B PCR Not Detected Not Detected    Respiratory Syncytial Virus PCR Not Detected Not Detected    SARS-CoV-2 PCR Not Detected Not Detected, Negative   Comprehensive metabolic panel    Collection Time: 03/24/25 11:56 AM   Result Value Ref Range    Sodium 132 (L) 136 - 145 mmol/L    Potassium 4.5 3.5 - 5.1 mmol/L    Chloride 99 98 - 107 mmol/L    CO2 18 (L) 23 - 31 mmol/L    Glucose 376 (H) 82 - 115 mg/dL    Blood Urea Nitrogen 42.0 (H) 9.8 - 20.1 mg/dL    Creatinine 1.62 (H) 0.55 - 1.02 mg/dL    Calcium 9.6 8.4 - 10.2 mg/dL    Protein Total 6.3 5.8 - 7.6 gm/dL    Albumin 2.3 (L) 3.4 - 4.8 g/dL    Globulin 4.0 (H) 2.4 - 3.5 gm/dL    Albumin/Globulin Ratio 0.6 (L) 1.1 - 2.0 ratio    Bilirubin Total 1.3 <=1.5 mg/dL    ALP 93 40 - 150 unit/L    ALT 16 0 - 55 unit/L    AST 18 11 - 45 unit/L    eGFR 34 mL/min/1.73/m2    Anion Gap 15.0 mEq/L    BUN/Creatinine Ratio 26    Troponin I    Collection Time: 03/24/25 11:56 AM   Result Value Ref Range    Troponin-I <0.010 0.000  - 0.045 ng/mL   Brain natriuretic peptide    Collection Time: 03/24/25 11:56 AM   Result Value Ref Range    Natriuretic Peptide 62.5 <=100.0 pg/mL   CBC with Differential    Collection Time: 03/24/25 11:56 AM   Result Value Ref Range    WBC 12.91 (H) 4.50 - 11.50 x10(3)/mcL    RBC 4.01 (L) 4.20 - 5.40 x10(6)/mcL    Hgb 10.8 (L) 12.0 - 16.0 g/dL    Hct 34.1 (L) 37.0 - 47.0 %    MCV 85.0 80.0 - 94.0 fL    MCH 26.9 (L) 27.0 - 31.0 pg    MCHC 31.7 (L) 33.0 - 36.0 g/dL    RDW 15.0 11.5 - 17.0 %    Platelet 150 130 - 400 x10(3)/mcL    MPV 12.8 (H) 7.4 - 10.4 fL    Neut % 87.9 %    Lymph % 4.2 %    Mono % 6.7 %    Eos % 0.1 %    Basophil % 0.2 %    Imm Grans % 0.9 %    Neut # 11.35 (H) 2.1 - 9.2 x10(3)/mcL    Lymph # 0.54 (L) 0.6 - 4.6 x10(3)/mcL    Mono # 0.87 0.1 - 1.3 x10(3)/mcL    Eos # 0.01 0 - 0.9 x10(3)/mcL    Baso # 0.03 <=0.2 x10(3)/mcL    Imm Gran # 0.11 (H) 0.00 - 0.04 x10(3)/mcL    NRBC% 0.0 %   TSH    Collection Time: 03/24/25 11:56 AM   Result Value Ref Range    TSH 1.694 0.350 - 4.940 uIU/mL   Lipase    Collection Time: 03/24/25 11:56 AM   Result Value Ref Range    Lipase Level 41 <=60 U/L   Urinalysis, Reflex to Urine Culture    Collection Time: 03/24/25  2:31 PM    Specimen: Urine   Result Value Ref Range    Color, UA Dark Yellow Yellow, Light-Yellow, Dark Yellow, Ericka, Straw    Appearance, UA Cloudy (A) Clear    Specific Gravity, UA 1.015 1.005 - 1.030    pH, UA 6.0 5.0 - 8.5    Protein, UA 3+ (A) Negative    Glucose, UA Negative Negative, Normal    Ketones, UA 1+ (A) Negative    Blood, UA 3+ (A) Negative    Bilirubin, UA 1+ (A) Negative    Urobilinogen, UA 2.0 (A) 0.2, 1.0, Normal    Nitrites, UA Negative Negative    Leukocyte Esterase, UA 3+ (A) Negative   Urinalysis, Microscopic    Collection Time: 03/24/25  2:31 PM   Result Value Ref Range    Bacteria, UA Few (A) None Seen, Rare, Occasional /HPF    Amporphous Urate Crystals, UA Moderate (A) None Seen /HPF    RBC, UA 6-10 (A) None Seen, 0-2, 3-5, 0-5  /HPF    WBC, UA 11-20 (A) None Seen, 0-2, 3-5, 0-5 /HPF    Squamous Epithelial Cells, UA Few (A) None Seen, Rare, Occasional, Occ /HPF   Urine culture    Collection Time: 03/24/25  2:31 PM    Specimen: Urine   Result Value Ref Range    Urine Culture >/= 100,000 colonies/ml Gram-negative Rods (A)    Lactic acid, plasma    Collection Time: 03/24/25  3:35 PM   Result Value Ref Range    Lactic Acid Level 1.2 0.5 - 2.2 mmol/L   POCT glucose    Collection Time: 03/24/25  9:13 PM   Result Value Ref Range    POCT Glucose 368 (H) 70 - 110 mg/dL   Comprehensive Metabolic Panel    Collection Time: 03/25/25  3:28 AM   Result Value Ref Range    Sodium 133 (L) 136 - 145 mmol/L    Potassium 4.0 3.5 - 5.1 mmol/L    Chloride 104 98 - 107 mmol/L    CO2 21 (L) 23 - 31 mmol/L    Glucose 183 (H) 82 - 115 mg/dL    Blood Urea Nitrogen 43.0 (H) 9.8 - 20.1 mg/dL    Creatinine 1.40 (H) 0.55 - 1.02 mg/dL    Calcium 8.8 8.4 - 10.2 mg/dL    Protein Total 5.4 (L) 5.8 - 7.6 gm/dL    Albumin 2.0 (L) 3.4 - 4.8 g/dL    Globulin 3.4 2.4 - 3.5 gm/dL    Albumin/Globulin Ratio 0.6 (L) 1.1 - 2.0 ratio    Bilirubin Total 0.5 <=1.5 mg/dL    ALP 76 40 - 150 unit/L    ALT 21 0 - 55 unit/L    AST 32 11 - 45 unit/L    eGFR 41 mL/min/1.73/m2    Anion Gap 8.0 mEq/L    BUN/Creatinine Ratio 31    Magnesium    Collection Time: 03/25/25  3:28 AM   Result Value Ref Range    Magnesium Level 2.20 1.60 - 2.60 mg/dL   Phosphorus    Collection Time: 03/25/25  3:28 AM   Result Value Ref Range    Phosphorus Level 3.3 2.3 - 4.7 mg/dL   CBC with Differential    Collection Time: 03/25/25  3:28 AM   Result Value Ref Range    WBC 7.82 4.50 - 11.50 x10(3)/mcL    RBC 3.41 (L) 4.20 - 5.40 x10(6)/mcL    Hgb 9.2 (L) 12.0 - 16.0 g/dL    Hct 30.6 (L) 37.0 - 47.0 %    MCV 89.7 80.0 - 94.0 fL    MCH 27.0 27.0 - 31.0 pg    MCHC 30.1 (L) 33.0 - 36.0 g/dL    RDW 15.2 11.5 - 17.0 %    Platelet 141 130 - 400 x10(3)/mcL    MPV 12.4 (H) 7.4 - 10.4 fL    Neut % 76.9 %    Lymph % 13.6 %    Mono %  7.7 %    Eos % 0.9 %    Basophil % 0.4 %    Imm Grans % 0.5 %    Neut # 6.02 2.1 - 9.2 x10(3)/mcL    Lymph # 1.06 0.6 - 4.6 x10(3)/mcL    Mono # 0.60 0.1 - 1.3 x10(3)/mcL    Eos # 0.07 0 - 0.9 x10(3)/mcL    Baso # 0.03 <=0.2 x10(3)/mcL    Imm Gran # 0.04 0.00 - 0.04 x10(3)/mcL    NRBC% 0.0 %        Diagnostic Results:  X-ray Chest AP Portable  Result Date: 3/24/2025  EXAMINATION: XR CHEST AP PORTABLE CLINICAL HISTORY: Shortness of breath;, . COMPARISON: 09/07/2019 FINDINGS: An AP view or more reveals to be normal in size.  The trachea is midline.  There is interim removal of the right PICC line.  Atherosclerosis seen within the aorta.  No infiltrate or effusion is seen.  Degenerative changes are noted to the thoracic spine.     1. No active cardiopulmonary disease identified Electronically signed by: Hilton German Date:    03/24/2025 Time:    14:22    CT Abdomen Pelvis With IV Contrast NO Oral Contrast  Result Date: 3/24/2025  EXAMINATION: CT ABDOMEN PELVIS WITH IV CONTRAST CLINICAL HISTORY: Abdominal abscess/infection suspected;, . TECHNIQUE: PATIENT RADIATION DOSE: DLP(mGycm) : 1587 As per PQRS measures, all CT scans at this facility used dose modulation, iterative reconstruction, and/or weight based dose adjustment when appropriate to reduce radiation dose to as low as reasonably achievable. COMPARISON: None available FINDINGS: Serial axial images were obtained through the abdomen and pelvis with the administration of  IV contrast. Coronal and sagittal reconstructions where also obtained. Degenerative changes are noted to the thoracolumbar spine.  There is a suspect small Schmorl's node and sclerotic changes at the inferior endplate of L3.  Bony structures are osteopenic.  The heart is mildly enlarged.  A small pericardial effusion is seen.  Hazy linear opacities evident at the lung bases.  The liver, adrenal glands, and pancreas are grossly within normal limits.  Extensive atherosclerosis seen within the aorta  and branching vessels.  There is mild mucosal prominence at the GE junction.  The spleen is mildly enlarged.  The kidneys are relatively symmetric in size.  There is extensive renal cortical thinning, lobulation, and perinephric stranding.  Multiple small round low-attenuation foci/suspect cysts are noted to the right kidney measuring up the 2 cm.  Nonobstructing punctate calcification noted to the kidneys bilaterally measuring up to 2-3 mm.  No dilated loops of bowel are identified.  Uterus is normal in size.  Gas and feces are seen within the colon.  Scattered diverticula are noted to the descending and sigmoid colon.  A prominent fecal bolus present within the sigmoid colon up to the rectum.  The uterus is normal in size.  There is trace edema/stranding in the lower abdomen and pelvis.     1. Findings of constipation with prominent fecal bolus at the sigmoid colon extending into the rectum 2. Mild edema/stranding in the lower abdomen and pelvis 3. Diverticulosis coli 4. Advanced the bilateral renal cortical thinning, lobulation, and perinephric stranding with the nonobstructing small punctate calcifications at the kidneys bilaterally and a few suspect small cysts at the right kidney.  A follow-up sonogram would allow further evaluation 5. Mild splenomegaly 6. Mild cardiomegaly small pericardial effusion 7. Findings and other details as above Electronically signed by: Hilton German Date:    03/24/2025 Time:    13:33       ASSESSMENT/PLAN:       Plan:   Patient Active Problem List    Diagnosis Date Noted    Acute cystitis without hematuria 03/24/2025    Metabolic acidosis 03/24/2025    Diabetes mellitus     Renal disorder     Personal history of colonic polyps 02/07/2023      Continue rocephin  Up to chair TID  Continue to follow urine culture  Advance diet  On lovenox for dvt ppx

## 2025-03-26 LAB
POCT GLUCOSE: 227 MG/DL (ref 70–110)
POCT GLUCOSE: 252 MG/DL (ref 70–110)
POCT GLUCOSE: 293 MG/DL (ref 70–110)
POCT GLUCOSE: 363 MG/DL (ref 70–110)

## 2025-03-26 PROCEDURE — 25000003 PHARM REV CODE 250: Performed by: INTERNAL MEDICINE

## 2025-03-26 PROCEDURE — 94761 N-INVAS EAR/PLS OXIMETRY MLT: CPT

## 2025-03-26 PROCEDURE — 25000003 PHARM REV CODE 250: Performed by: FAMILY MEDICINE

## 2025-03-26 PROCEDURE — 63600175 PHARM REV CODE 636 W HCPCS: Performed by: FAMILY MEDICINE

## 2025-03-26 PROCEDURE — A4216 STERILE WATER/SALINE, 10 ML: HCPCS | Performed by: INTERNAL MEDICINE

## 2025-03-26 PROCEDURE — 63600175 PHARM REV CODE 636 W HCPCS: Performed by: INTERNAL MEDICINE

## 2025-03-26 PROCEDURE — 21400001 HC TELEMETRY ROOM

## 2025-03-26 RX ADMIN — SODIUM CHLORIDE, PRESERVATIVE FREE 10 ML: 5 INJECTION INTRAVENOUS at 09:03

## 2025-03-26 RX ADMIN — SODIUM CHLORIDE, PRESERVATIVE FREE 10 ML: 5 INJECTION INTRAVENOUS at 05:03

## 2025-03-26 RX ADMIN — SODIUM CHLORIDE, PRESERVATIVE FREE 10 ML: 5 INJECTION INTRAVENOUS at 02:03

## 2025-03-26 RX ADMIN — INSULIN ASPART 6 UNITS: 100 INJECTION, SOLUTION INTRAVENOUS; SUBCUTANEOUS at 11:03

## 2025-03-26 RX ADMIN — ASPIRIN 81 MG: 81 TABLET, COATED ORAL at 09:03

## 2025-03-26 RX ADMIN — ENOXAPARIN SODIUM 40 MG: 40 INJECTION SUBCUTANEOUS at 09:03

## 2025-03-26 RX ADMIN — ATORVASTATIN CALCIUM 40 MG: 40 TABLET, FILM COATED ORAL at 09:03

## 2025-03-26 RX ADMIN — INSULIN ASPART 10 UNITS: 100 INJECTION, SOLUTION INTRAVENOUS; SUBCUTANEOUS at 05:03

## 2025-03-26 RX ADMIN — ARIPIPRAZOLE 10 MG: 5 TABLET ORAL at 09:03

## 2025-03-26 RX ADMIN — INSULIN ASPART 6 UNITS: 100 INJECTION, SOLUTION INTRAVENOUS; SUBCUTANEOUS at 06:03

## 2025-03-26 RX ADMIN — ALLOPURINOL 100 MG: 100 TABLET ORAL at 09:03

## 2025-03-26 RX ADMIN — CEFTRIAXONE SODIUM 1 G: 1 INJECTION, POWDER, FOR SOLUTION INTRAMUSCULAR; INTRAVENOUS at 03:03

## 2025-03-26 RX ADMIN — INSULIN ASPART 2 UNITS: 100 INJECTION, SOLUTION INTRAVENOUS; SUBCUTANEOUS at 09:03

## 2025-03-26 RX ADMIN — SERTRALINE HYDROCHLORIDE 100 MG: 50 TABLET ORAL at 09:03

## 2025-03-26 NOTE — PLAN OF CARE
Problem: Adult Inpatient Plan of Care  Goal: Plan of Care Review  Outcome: Progressing  Goal: Patient-Specific Goal (Individualized)  Outcome: Progressing  Goal: Absence of Hospital-Acquired Illness or Injury  Outcome: Progressing  Goal: Optimal Comfort and Wellbeing  Outcome: Progressing  Goal: Readiness for Transition of Care  Outcome: Progressing     Problem: Bariatric Environmental Safety  Goal: Safety Maintained with Care  Outcome: Progressing     Problem: Diabetes Comorbidity  Goal: Blood Glucose Level Within Targeted Range  Outcome: Progressing     Problem: Acute Kidney Injury/Impairment  Goal: Fluid and Electrolyte Balance  Outcome: Progressing  Goal: Improved Oral Intake  Outcome: Progressing  Goal: Effective Renal Function  Outcome: Progressing     Problem: UTI (Urinary Tract Infection)  Goal: Improved Infection Symptoms  Outcome: Progressing     Problem: Bariatric Environmental Safety  Goal: Safety Maintained with Care  Outcome: Progressing     Problem: Diabetes Comorbidity  Goal: Blood Glucose Level Within Targeted Range  Outcome: Progressing     Problem: Acute Kidney Injury/Impairment  Goal: Fluid and Electrolyte Balance  Outcome: Progressing     Problem: UTI (Urinary Tract Infection)  Goal: Improved Infection Symptoms  Outcome: Progressing

## 2025-03-26 NOTE — PROGRESS NOTES
Progress Note    Admit Date: 3/24/2025    DATE OF SERVICE 3/26/25         SUBJECTIVE:     Follow-up For:  Admitted for UTI and MYLA.  Doing better today . Appetite is better and she is asking to eat.  Blood culture negative, urine culture > 100k gnr, final pending.    Scheduled Meds:   allopurinoL  100 mg Oral Daily    ARIPiprazole  10 mg Oral Daily    aspirin  81 mg Oral Daily    atorvastatin  40 mg Oral Daily    cefTRIAXone  1 g Intravenous Q24H    enoxparin  40 mg Subcutaneous Q12H    sertraline  100 mg Oral Daily    sodium chloride 0.9%  10 mL Intravenous Q8H     Continuous Infusions:  PRN Meds:  Current Facility-Administered Medications:     acetaminophen, 650 mg, Oral, Q4H PRN    dextrose 50%, 12.5 g, Intravenous, PRN    dextrose 50%, 25 g, Intravenous, PRN    glucagon (human recombinant), 1 mg, Intramuscular, PRN    glucose, 16 g, Oral, PRN    glucose, 24 g, Oral, PRN    insulin aspart U-100, 0-10 Units, Subcutaneous, QID (AC + HS) PRN    ondansetron, 4 mg, Intravenous, Q4H PRN    Review of patient's allergies indicates:   Allergen Reactions    Opioids - morphine analogues Itching    Avocado (laurus persea)        Review of Systems  ROS    OBJECTIVE:     Vital Signs (Most Recent)  Temp: 98.3 °F (36.8 °C) (03/26/25 0341)  Pulse: 92 (03/26/25 0753)  Resp: 20 (03/26/25 0753)  BP: 137/82 (03/26/25 0341)  SpO2: 97 % (03/26/25 0753)    Vital Signs Range (Last 24H):  Temp:  [97.8 °F (36.6 °C)-98.4 °F (36.9 °C)]   Pulse:  []   Resp:  [20-22]   BP: (101-137)/(55-82)   SpO2:  [92 %-97 %]     I & O (Last 24H):  Intake/Output Summary (Last 24 hours) at 3/26/2025 0805  Last data filed at 3/26/2025 0112  Gross per 24 hour   Intake 2068.43 ml   Output --   Net 2068.43 ml     Physical Exam:  Physical Exam   Constitutional: She is oriented to person, place, and time. She appears well-developed and well-nourished. No distress.   obese   HENT:   Head: Normocephalic.   Right Ear: External ear normal.   Left Ear: External  ear normal.   Eyes: Pupils are equal, round, and reactive to light.   Neck: No tracheal deviation present. No thyromegaly present.   Cardiovascular: Normal rate, regular rhythm and normal heart sounds. Exam reveals no friction rub.   No murmur heard.Pulmonary:      Effort: Pulmonary effort is normal.      Breath sounds: Normal breath sounds.     Abdominal: She exhibits no distension and no mass. There is no rebound and no guarding.   Musculoskeletal:         General: No tenderness or deformity.   Neurological: She is alert and oriented to person, place, and time. No cranial nerve deficit. She exhibits normal muscle tone. Coordination normal.   Skin: Skin is warm and dry. Capillary refill takes less than 2 seconds. She is not diaphoretic. No erythema.   Psychiatric: Her behavior is normal. Judgment and thought content normal.        Laboratory:  Recent Results (from the past 24 hours)   POCT glucose    Collection Time: 03/25/25 10:51 AM   Result Value Ref Range    POCT Glucose 200 (H) 70 - 110 mg/dL   POCT glucose    Collection Time: 03/25/25  3:51 PM   Result Value Ref Range    POCT Glucose 327 (H) 70 - 110 mg/dL        Diagnostic Results:  X-ray Chest AP Portable  Result Date: 3/24/2025  EXAMINATION: XR CHEST AP PORTABLE CLINICAL HISTORY: Shortness of breath;, . COMPARISON: 09/07/2019 FINDINGS: An AP view or more reveals to be normal in size.  The trachea is midline.  There is interim removal of the right PICC line.  Atherosclerosis seen within the aorta.  No infiltrate or effusion is seen.  Degenerative changes are noted to the thoracic spine.     1. No active cardiopulmonary disease identified Electronically signed by: Hilton German Date:    03/24/2025 Time:    14:22    CT Abdomen Pelvis With IV Contrast NO Oral Contrast  Result Date: 3/24/2025  EXAMINATION: CT ABDOMEN PELVIS WITH IV CONTRAST CLINICAL HISTORY: Abdominal abscess/infection suspected;, . TECHNIQUE: PATIENT RADIATION DOSE: DLP(mGycm) : 1587 As per  PQRS measures, all CT scans at this facility used dose modulation, iterative reconstruction, and/or weight based dose adjustment when appropriate to reduce radiation dose to as low as reasonably achievable. COMPARISON: None available FINDINGS: Serial axial images were obtained through the abdomen and pelvis with the administration of  IV contrast. Coronal and sagittal reconstructions where also obtained. Degenerative changes are noted to the thoracolumbar spine.  There is a suspect small Schmorl's node and sclerotic changes at the inferior endplate of L3.  Bony structures are osteopenic.  The heart is mildly enlarged.  A small pericardial effusion is seen.  Hazy linear opacities evident at the lung bases.  The liver, adrenal glands, and pancreas are grossly within normal limits.  Extensive atherosclerosis seen within the aorta and branching vessels.  There is mild mucosal prominence at the GE junction.  The spleen is mildly enlarged.  The kidneys are relatively symmetric in size.  There is extensive renal cortical thinning, lobulation, and perinephric stranding.  Multiple small round low-attenuation foci/suspect cysts are noted to the right kidney measuring up the 2 cm.  Nonobstructing punctate calcification noted to the kidneys bilaterally measuring up to 2-3 mm.  No dilated loops of bowel are identified.  Uterus is normal in size.  Gas and feces are seen within the colon.  Scattered diverticula are noted to the descending and sigmoid colon.  A prominent fecal bolus present within the sigmoid colon up to the rectum.  The uterus is normal in size.  There is trace edema/stranding in the lower abdomen and pelvis.     1. Findings of constipation with prominent fecal bolus at the sigmoid colon extending into the rectum 2. Mild edema/stranding in the lower abdomen and pelvis 3. Diverticulosis coli 4. Advanced the bilateral renal cortical thinning, lobulation, and perinephric stranding with the nonobstructing small  punctate calcifications at the kidneys bilaterally and a few suspect small cysts at the right kidney.  A follow-up sonogram would allow further evaluation 5. Mild splenomegaly 6. Mild cardiomegaly small pericardial effusion 7. Findings and other details as above Electronically signed by: Hitlon German Date:    03/24/2025 Time:    13:33       ASSESSMENT/PLAN:       Plan:   Patient Active Problem List    Diagnosis Date Noted    Acute cystitis without hematuria 03/24/2025    Metabolic acidosis 03/24/2025    Diabetes mellitus     Renal disorder     Personal history of colonic polyps 02/07/2023      Continue rocephin, clinically she is improving  Up to chair TID  Continue to follow urine culture, once culture is final will be able to probably DC her home with some oral antibiotics if it is sensitive to something.  Advance diet  On lovenox for dvt ppx

## 2025-03-26 NOTE — PLAN OF CARE
Problem: Adult Inpatient Plan of Care  Goal: Plan of Care Review  Outcome: Progressing  Goal: Patient-Specific Goal (Individualized)  Outcome: Progressing  Goal: Absence of Hospital-Acquired Illness or Injury  Outcome: Progressing  Goal: Optimal Comfort and Wellbeing  Outcome: Progressing  Goal: Readiness for Transition of Care  Outcome: Progressing     Problem: Bariatric Environmental Safety  Goal: Safety Maintained with Care  Outcome: Progressing     Problem: Diabetes Comorbidity  Goal: Blood Glucose Level Within Targeted Range  Outcome: Progressing     Problem: Acute Kidney Injury/Impairment  Goal: Fluid and Electrolyte Balance  Outcome: Progressing  Goal: Improved Oral Intake  Outcome: Progressing  Goal: Effective Renal Function  Outcome: Progressing     Problem: UTI (Urinary Tract Infection)  Goal: Improved Infection Symptoms  Outcome: Progressing

## 2025-03-27 VITALS
OXYGEN SATURATION: 95 % | RESPIRATION RATE: 20 BRPM | TEMPERATURE: 98 F | HEIGHT: 68 IN | BODY MASS INDEX: 43.5 KG/M2 | WEIGHT: 287 LBS | HEART RATE: 87 BPM | DIASTOLIC BLOOD PRESSURE: 78 MMHG | SYSTOLIC BLOOD PRESSURE: 127 MMHG

## 2025-03-27 LAB
ALBUMIN SERPL-MCNC: 2.2 G/DL (ref 3.4–4.8)
ALBUMIN/GLOB SERPL: 0.6 RATIO (ref 1.1–2)
ALP SERPL-CCNC: 74 UNIT/L (ref 40–150)
ALT SERPL-CCNC: 25 UNIT/L (ref 0–55)
ANION GAP SERPL CALC-SCNC: 8 MEQ/L
AST SERPL-CCNC: 20 UNIT/L (ref 11–45)
BASOPHILS # BLD AUTO: 0.05 X10(3)/MCL
BASOPHILS NFR BLD AUTO: 0.7 %
BILIRUB SERPL-MCNC: 0.3 MG/DL
BUN SERPL-MCNC: 24 MG/DL (ref 9.8–20.1)
CALCIUM SERPL-MCNC: 9.1 MG/DL (ref 8.4–10.2)
CHLORIDE SERPL-SCNC: 108 MMOL/L (ref 98–107)
CO2 SERPL-SCNC: 25 MMOL/L (ref 23–31)
CREAT SERPL-MCNC: 1.12 MG/DL (ref 0.55–1.02)
CREAT/UREA NIT SERPL: 21
EOSINOPHIL # BLD AUTO: 0.12 X10(3)/MCL (ref 0–0.9)
EOSINOPHIL NFR BLD AUTO: 1.6 %
ERYTHROCYTE [DISTWIDTH] IN BLOOD BY AUTOMATED COUNT: 15.1 % (ref 11.5–17)
GFR SERPLBLD CREATININE-BSD FMLA CKD-EPI: 53 ML/MIN/1.73/M2
GLOBULIN SER-MCNC: 3.8 GM/DL (ref 2.4–3.5)
GLUCOSE SERPL-MCNC: 226 MG/DL (ref 82–115)
HCT VFR BLD AUTO: 32.4 % (ref 37–47)
HGB BLD-MCNC: 10.2 G/DL (ref 12–16)
IMM GRANULOCYTES # BLD AUTO: 0.09 X10(3)/MCL (ref 0–0.04)
IMM GRANULOCYTES NFR BLD AUTO: 1.2 %
LYMPHOCYTES # BLD AUTO: 1.33 X10(3)/MCL (ref 0.6–4.6)
LYMPHOCYTES NFR BLD AUTO: 17.9 %
MCH RBC QN AUTO: 27.2 PG (ref 27–31)
MCHC RBC AUTO-ENTMCNC: 31.5 G/DL (ref 33–36)
MCV RBC AUTO: 86.4 FL (ref 80–94)
MONOCYTES # BLD AUTO: 0.35 X10(3)/MCL (ref 0.1–1.3)
MONOCYTES NFR BLD AUTO: 4.7 %
NEUTROPHILS # BLD AUTO: 5.49 X10(3)/MCL (ref 2.1–9.2)
NEUTROPHILS NFR BLD AUTO: 73.9 %
NRBC BLD AUTO-RTO: 0 %
PLATELET # BLD AUTO: 196 X10(3)/MCL (ref 130–400)
PMV BLD AUTO: 11.4 FL (ref 7.4–10.4)
POCT GLUCOSE: 236 MG/DL (ref 70–110)
POCT GLUCOSE: 338 MG/DL (ref 70–110)
POTASSIUM SERPL-SCNC: 4.2 MMOL/L (ref 3.5–5.1)
PROT SERPL-MCNC: 6 GM/DL (ref 5.8–7.6)
RBC # BLD AUTO: 3.75 X10(6)/MCL (ref 4.2–5.4)
SODIUM SERPL-SCNC: 141 MMOL/L (ref 136–145)
WBC # BLD AUTO: 7.43 X10(3)/MCL (ref 4.5–11.5)

## 2025-03-27 PROCEDURE — 36415 COLL VENOUS BLD VENIPUNCTURE: CPT | Performed by: FAMILY MEDICINE

## 2025-03-27 PROCEDURE — 80053 COMPREHEN METABOLIC PANEL: CPT | Performed by: FAMILY MEDICINE

## 2025-03-27 PROCEDURE — 63600175 PHARM REV CODE 636 W HCPCS: Performed by: INTERNAL MEDICINE

## 2025-03-27 PROCEDURE — 25000003 PHARM REV CODE 250: Performed by: FAMILY MEDICINE

## 2025-03-27 PROCEDURE — 94799 UNLISTED PULMONARY SVC/PX: CPT

## 2025-03-27 PROCEDURE — 94761 N-INVAS EAR/PLS OXIMETRY MLT: CPT

## 2025-03-27 PROCEDURE — 25000003 PHARM REV CODE 250: Performed by: INTERNAL MEDICINE

## 2025-03-27 PROCEDURE — 63600175 PHARM REV CODE 636 W HCPCS: Performed by: FAMILY MEDICINE

## 2025-03-27 PROCEDURE — A4216 STERILE WATER/SALINE, 10 ML: HCPCS | Performed by: INTERNAL MEDICINE

## 2025-03-27 PROCEDURE — 85025 COMPLETE CBC W/AUTO DIFF WBC: CPT | Performed by: FAMILY MEDICINE

## 2025-03-27 RX ORDER — CIPROFLOXACIN 500 MG/1
500 TABLET ORAL 2 TIMES DAILY
Qty: 10 TABLET | Refills: 0 | Status: SHIPPED | OUTPATIENT
Start: 2025-03-27

## 2025-03-27 RX ADMIN — ASPIRIN 81 MG: 81 TABLET, COATED ORAL at 09:03

## 2025-03-27 RX ADMIN — INSULIN ASPART 4 UNITS: 100 INJECTION, SOLUTION INTRAVENOUS; SUBCUTANEOUS at 06:03

## 2025-03-27 RX ADMIN — ALLOPURINOL 100 MG: 100 TABLET ORAL at 09:03

## 2025-03-27 RX ADMIN — ENOXAPARIN SODIUM 40 MG: 40 INJECTION SUBCUTANEOUS at 09:03

## 2025-03-27 RX ADMIN — CEFTRIAXONE SODIUM 1 G: 1 INJECTION, POWDER, FOR SOLUTION INTRAMUSCULAR; INTRAVENOUS at 03:03

## 2025-03-27 RX ADMIN — INSULIN ASPART 8 UNITS: 100 INJECTION, SOLUTION INTRAVENOUS; SUBCUTANEOUS at 11:03

## 2025-03-27 RX ADMIN — SERTRALINE HYDROCHLORIDE 100 MG: 50 TABLET ORAL at 09:03

## 2025-03-27 RX ADMIN — SODIUM CHLORIDE, PRESERVATIVE FREE 10 ML: 5 INJECTION INTRAVENOUS at 02:03

## 2025-03-27 RX ADMIN — ARIPIPRAZOLE 10 MG: 5 TABLET ORAL at 09:03

## 2025-03-27 RX ADMIN — SODIUM CHLORIDE, PRESERVATIVE FREE 10 ML: 5 INJECTION INTRAVENOUS at 06:03

## 2025-03-27 RX ADMIN — ATORVASTATIN CALCIUM 40 MG: 40 TABLET, FILM COATED ORAL at 09:03

## 2025-03-27 NOTE — DISCHARGE SUMMARY
DISCHARGE SUMMARY  Hospital Medicine    Team: Networked reference to record PCT     Patient Name: Vi Sultana  YOB: 1955    Admit Date: 3/24/2025    Discharge Date: 03/27/2025    Discharge Attending Physician: Teresa Santana MD     Diagnoses:  Active Hospital Problems    Diagnosis  POA    *Acute cystitis without hematuria [N30.00]  Yes    Metabolic acidosis [E87.20]  No    Diabetes mellitus [E11.9]  Yes    Renal disorder [N28.9]  Yes      Resolved Hospital Problems   No resolved problems to display.       Discharged Condition: admit problems have stabilized       HOSPITAL COURSE:    Initial Presentation:     Pt admitted with UTI and MYLA.  Improving on rocephin and with iv fluids.  Final urine culture with sensitivites is pending however she has improved tremendously.  She will be discharged home with Cipro and we will follow up the final urine culture.  She will have received 4 doses of Rocephin prior to discharge.     Course of Principle Problem for Admission:    Improved    Disposition:  Home      Future Scheduled Appointments:  No future appointments.    Follow-up Plans from This Hospitalization:  1 week with me    Last CBC/BMP/HgbA1c (if applicable):  Recent Results (from the past 2 weeks)   CBC with Differential    Collection Time: 03/27/25  5:19 AM   Result Value Ref Range    WBC 7.43 4.50 - 11.50 x10(3)/mcL    Hgb 10.2 (L) 12.0 - 16.0 g/dL    Hct 32.4 (L) 37.0 - 47.0 %    Platelet 196 130 - 400 x10(3)/mcL   CBC with Differential    Collection Time: 03/25/25  3:28 AM   Result Value Ref Range    WBC 7.82 4.50 - 11.50 x10(3)/mcL    Hgb 9.2 (L) 12.0 - 16.0 g/dL    Hct 30.6 (L) 37.0 - 47.0 %    Platelet 141 130 - 400 x10(3)/mcL   CBC with Differential    Collection Time: 03/24/25 11:56 AM   Result Value Ref Range    WBC 12.91 (H) 4.50 - 11.50 x10(3)/mcL    Hgb 10.8 (L) 12.0 - 16.0 g/dL    Hct 34.1 (L) 37.0 - 47.0 %    Platelet 150 130 - 400 x10(3)/mcL     No results found for this or any  previous visit (from the past 2 weeks).  Lab Results   Component Value Date    HGBA1C 6.7 12/09/2024       Discharge Medication List:     Medication List        START taking these medications      ciprofloxacin HCl 500 MG tablet  Commonly known as: CIPRO  Take 1 tablet (500 mg total) by mouth 2 (two) times daily.            CONTINUE taking these medications      allopurinoL 100 MG tablet  Commonly known as: ZYLOPRIM     ARIPiprazole 10 MG Tab  Commonly known as: ABILIFY     aspirin 81 MG EC tablet  Commonly known as: ECOTRIN     ferrous gluconate 324 MG tablet  Commonly known as: FERGON     furosemide 40 MG tablet  Commonly known as: LASIX     rosuvastatin 40 MG Tab  Commonly known as: CRESTOR     sertraline 100 MG tablet  Commonly known as: ZOLOFT     VICTOZA 2-VIKRAM 0.6 mg/0.1 mL (18 mg/3 mL) Pnij pen  Generic drug: liraglutide 0.6 mg/0.1 mL (18 mg/3 mL) subq PNIJ               Where to Get Your Medications        These medications were sent to [facility] [address]  [address] [address]      Phone: [phone]   ciprofloxacin HCl 500 MG tablet         Patient Instructions:  Patient encouraged to call office (answering service available 24/7)  or return to ED if symptoms worsen.    Time spent on Discharge:  Total time spent on discharging patient, reconciling medications and problems and documentation in the medical record was > 35 minutes    Signing Physician:  Teresa Santana MD

## 2025-03-27 NOTE — PLAN OF CARE
Problem: Adult Inpatient Plan of Care  Goal: Plan of Care Review  Outcome: Met  Goal: Patient-Specific Goal (Individualized)  Outcome: Met  Goal: Absence of Hospital-Acquired Illness or Injury  Outcome: Met  Goal: Optimal Comfort and Wellbeing  Outcome: Met  Goal: Readiness for Transition of Care  Outcome: Met     Problem: Bariatric Environmental Safety  Goal: Safety Maintained with Care  Outcome: Met     Problem: Diabetes Comorbidity  Goal: Blood Glucose Level Within Targeted Range  Outcome: Met     Problem: Acute Kidney Injury/Impairment  Goal: Fluid and Electrolyte Balance  Outcome: Met  Goal: Improved Oral Intake  Outcome: Met  Goal: Effective Renal Function  Outcome: Met     Problem: UTI (Urinary Tract Infection)  Goal: Improved Infection Symptoms  Outcome: Met

## 2025-03-28 ENCOUNTER — PATIENT OUTREACH (OUTPATIENT)
Dept: ADMINISTRATIVE | Facility: CLINIC | Age: 70
End: 2025-03-28
Payer: MEDICARE

## 2025-03-28 LAB
BACTERIA UR CULT: ABNORMAL
BACTERIA UR CULT: ABNORMAL

## 2025-03-28 NOTE — TELEPHONE ENCOUNTER
Patient reports taking Caltrate 600 plus D oral one tablet BID and fish oil-omega-3 fatty acids 300-1,000 mg capsule, 1 capsule BID. Patient also reported taking Crestor 40mg tablet, not 10mg as stated on discharge summary.

## 2025-03-28 NOTE — PROGRESS NOTES
C3 nurse spoke with Vi Sultana's daughter, Rashmi, for a TCC post hospital discharge follow up call. The patient has a scheduled HOSFU appointment with Teresa Santana MD on 4/2 @ 9849.

## 2025-03-30 ENCOUNTER — PATIENT MESSAGE (OUTPATIENT)
Dept: MEDSURG UNIT | Facility: HOSPITAL | Age: 70
End: 2025-03-30
Payer: MEDICARE

## 2025-03-30 LAB
BACTERIA BLD CULT: NORMAL
BACTERIA BLD CULT: NORMAL

## 2025-04-02 ENCOUNTER — LAB VISIT (OUTPATIENT)
Dept: LAB | Facility: HOSPITAL | Age: 70
End: 2025-04-02
Attending: FAMILY MEDICINE
Payer: MEDICARE

## 2025-04-02 DIAGNOSIS — N30.00 ACUTE CYSTITIS: Primary | ICD-10-CM

## 2025-04-02 LAB
ALBUMIN SERPL-MCNC: 2.7 G/DL (ref 3.4–4.8)
ALBUMIN/GLOB SERPL: 0.7 RATIO (ref 1.1–2)
ALP SERPL-CCNC: 73 UNIT/L (ref 40–150)
ALT SERPL-CCNC: 26 UNIT/L (ref 0–55)
ANION GAP SERPL CALC-SCNC: 11 MEQ/L
AST SERPL-CCNC: 28 UNIT/L (ref 11–45)
BASOPHILS # BLD AUTO: 0.04 X10(3)/MCL
BASOPHILS NFR BLD AUTO: 0.6 %
BILIRUB SERPL-MCNC: 0.4 MG/DL
BUN SERPL-MCNC: 17 MG/DL (ref 9.8–20.1)
CALCIUM SERPL-MCNC: 9.9 MG/DL (ref 8.4–10.2)
CHLORIDE SERPL-SCNC: 101 MMOL/L (ref 98–107)
CO2 SERPL-SCNC: 27 MMOL/L (ref 23–31)
CREAT SERPL-MCNC: 1.17 MG/DL (ref 0.55–1.02)
CREAT/UREA NIT SERPL: 15
EOSINOPHIL # BLD AUTO: 0.09 X10(3)/MCL (ref 0–0.9)
EOSINOPHIL NFR BLD AUTO: 1.3 %
ERYTHROCYTE [DISTWIDTH] IN BLOOD BY AUTOMATED COUNT: 14.9 % (ref 11.5–17)
GFR SERPLBLD CREATININE-BSD FMLA CKD-EPI: 50 ML/MIN/1.73/M2
GLOBULIN SER-MCNC: 4.1 GM/DL (ref 2.4–3.5)
GLUCOSE SERPL-MCNC: 311 MG/DL (ref 82–115)
HCT VFR BLD AUTO: 35.1 % (ref 37–47)
HGB BLD-MCNC: 10.8 G/DL (ref 12–16)
IMM GRANULOCYTES # BLD AUTO: 0.03 X10(3)/MCL (ref 0–0.04)
IMM GRANULOCYTES NFR BLD AUTO: 0.4 %
LYMPHOCYTES # BLD AUTO: 1.03 X10(3)/MCL (ref 0.6–4.6)
LYMPHOCYTES NFR BLD AUTO: 15 %
MCH RBC QN AUTO: 26.6 PG (ref 27–31)
MCHC RBC AUTO-ENTMCNC: 30.8 G/DL (ref 33–36)
MCV RBC AUTO: 86.5 FL (ref 80–94)
MONOCYTES # BLD AUTO: 0.31 X10(3)/MCL (ref 0.1–1.3)
MONOCYTES NFR BLD AUTO: 4.5 %
NEUTROPHILS # BLD AUTO: 5.35 X10(3)/MCL (ref 2.1–9.2)
NEUTROPHILS NFR BLD AUTO: 78.2 %
NRBC BLD AUTO-RTO: 0 %
PLATELET # BLD AUTO: 264 X10(3)/MCL (ref 130–400)
PMV BLD AUTO: 11.2 FL (ref 7.4–10.4)
POTASSIUM SERPL-SCNC: 4.2 MMOL/L (ref 3.5–5.1)
PROT SERPL-MCNC: 6.8 GM/DL (ref 5.8–7.6)
RBC # BLD AUTO: 4.06 X10(6)/MCL (ref 4.2–5.4)
SODIUM SERPL-SCNC: 139 MMOL/L (ref 136–145)
WBC # BLD AUTO: 6.85 X10(3)/MCL (ref 4.5–11.5)

## 2025-04-02 PROCEDURE — 36415 COLL VENOUS BLD VENIPUNCTURE: CPT

## 2025-04-02 PROCEDURE — 80053 COMPREHEN METABOLIC PANEL: CPT

## 2025-04-02 PROCEDURE — 85025 COMPLETE CBC W/AUTO DIFF WBC: CPT

## 2025-05-23 ENCOUNTER — HOSPITAL ENCOUNTER (EMERGENCY)
Facility: HOSPITAL | Age: 70
Discharge: HOME | End: 2025-05-23
Attending: EMERGENCY MEDICINE
Payer: MEDICARE

## 2025-06-07 ENCOUNTER — HOSPITAL ENCOUNTER (INPATIENT)
Facility: HOSPITAL | Age: 70
LOS: 5 days | Discharge: HOME HEALTH | DRG: 871 | End: 2025-06-12
Attending: EMERGENCY MEDICINE | Admitting: INTERNAL MEDICINE
Payer: MEDICARE

## 2025-06-07 DIAGNOSIS — A41.9 SEPSIS, DUE TO UNSPECIFIED ORGANISM, UNSPECIFIED WHETHER ACUTE ORGAN DYSFUNCTION PRESENT: ICD-10-CM

## 2025-06-07 DIAGNOSIS — I50.9 HEART FAILURE, UNSPECIFIED HF CHRONICITY, UNSPECIFIED HEART FAILURE TYPE: ICD-10-CM

## 2025-06-07 DIAGNOSIS — A41.9 SEVERE SEPSIS: ICD-10-CM

## 2025-06-07 DIAGNOSIS — R73.9 HYPERGLYCEMIA: ICD-10-CM

## 2025-06-07 DIAGNOSIS — E87.20 METABOLIC ACIDOSIS: ICD-10-CM

## 2025-06-07 DIAGNOSIS — R07.9 CHEST PAIN: ICD-10-CM

## 2025-06-07 DIAGNOSIS — N39.0 URINARY TRACT INFECTION WITHOUT HEMATURIA, SITE UNSPECIFIED: ICD-10-CM

## 2025-06-07 DIAGNOSIS — A41.9 SEPSIS: Primary | ICD-10-CM

## 2025-06-07 DIAGNOSIS — R65.20 SEVERE SEPSIS: ICD-10-CM

## 2025-06-07 DIAGNOSIS — Z13.6 SCREENING FOR CARDIOVASCULAR CONDITION: ICD-10-CM

## 2025-06-07 DIAGNOSIS — R06.02 SOB (SHORTNESS OF BREATH): ICD-10-CM

## 2025-06-07 DIAGNOSIS — I82.409 DVT (DEEP VENOUS THROMBOSIS): ICD-10-CM

## 2025-06-07 DIAGNOSIS — N17.9 AKI (ACUTE KIDNEY INJURY): ICD-10-CM

## 2025-06-07 LAB
ALBUMIN SERPL-MCNC: 2.6 G/DL (ref 3.4–4.8)
ALBUMIN/GLOB SERPL: 0.7 RATIO (ref 1.1–2)
ALP SERPL-CCNC: 90 UNIT/L (ref 40–150)
ALT SERPL-CCNC: 14 UNIT/L (ref 0–55)
ANION GAP SERPL CALC-SCNC: 12 MEQ/L
ANION GAP SERPL CALC-SCNC: 21 MEQ/L
AST SERPL-CCNC: 19 UNIT/L (ref 11–45)
B-OH-BUTYR SERPL-MCNC: 2 MMOL/L
BACTERIA #/AREA URNS AUTO: ABNORMAL /HPF
BASE EXCESS BLD CALC-SCNC: -3.1 MMOL/L
BASOPHILS # BLD AUTO: 0.06 X10(3)/MCL
BASOPHILS NFR BLD AUTO: 0.4 %
BILIRUB SERPL-MCNC: 2.2 MG/DL
BILIRUB UR QL STRIP.AUTO: NEGATIVE
BLOOD GAS SAMPLE TYPE (OHS): ABNORMAL
BNP BLD-MCNC: 226.5 PG/ML
BUN SERPL-MCNC: 43.4 MG/DL (ref 9.8–20.1)
BUN SERPL-MCNC: 46.5 MG/DL (ref 9.8–20.1)
CA-I BLD-SCNC: 1.16 MMOL/L (ref 1.12–1.23)
CALCIUM SERPL-MCNC: 8.5 MG/DL (ref 8.4–10.2)
CALCIUM SERPL-MCNC: 9.1 MG/DL (ref 8.4–10.2)
CHLORIDE SERPL-SCNC: 100 MMOL/L (ref 98–107)
CHLORIDE SERPL-SCNC: 105 MMOL/L (ref 98–107)
CLARITY UR: ABNORMAL
CO2 BLDA-SCNC: 21.6 MMOL/L
CO2 SERPL-SCNC: 15 MMOL/L (ref 23–31)
CO2 SERPL-SCNC: 19 MMOL/L (ref 23–31)
COHGB MFR BLDA: 3.5 %
COLOR UR AUTO: YELLOW
CREAT SERPL-MCNC: 2 MG/DL (ref 0.55–1.02)
CREAT SERPL-MCNC: 2.2 MG/DL (ref 0.55–1.02)
CREAT/UREA NIT SERPL: 21
CREAT/UREA NIT SERPL: 22
DRAWN BY BLOOD GAS (OHS): ABNORMAL
EOSINOPHIL # BLD AUTO: 0.07 X10(3)/MCL (ref 0–0.9)
EOSINOPHIL NFR BLD AUTO: 0.5 %
ERYTHROCYTE [DISTWIDTH] IN BLOOD BY AUTOMATED COUNT: 16.8 % (ref 11.5–17)
EST. AVERAGE GLUCOSE BLD GHB EST-MCNC: 208.7 MG/DL
GFR SERPLBLD CREATININE-BSD FMLA CKD-EPI: 24 ML/MIN/1.73/M2
GFR SERPLBLD CREATININE-BSD FMLA CKD-EPI: 26 ML/MIN/1.73/M2
GLOBULIN SER-MCNC: 3.5 GM/DL (ref 2.4–3.5)
GLUCOSE SERPL-MCNC: 345 MG/DL (ref 82–115)
GLUCOSE SERPL-MCNC: 441 MG/DL (ref 82–115)
GLUCOSE UR QL STRIP: NORMAL
HBA1C MFR BLD: 8.9 %
HCO3 BLDA-SCNC: 20.6 MMOL/L
HCT VFR BLD AUTO: 34.7 % (ref 37–47)
HGB BLD-MCNC: 10.7 G/DL (ref 12–16)
HGB UR QL STRIP: ABNORMAL
IMM GRANULOCYTES # BLD AUTO: 0.33 X10(3)/MCL (ref 0–0.04)
IMM GRANULOCYTES NFR BLD AUTO: 2.2 %
INR PPP: 1.2
KETONES UR QL STRIP: NEGATIVE
LACTATE SERPL-SCNC: 1.2 MMOL/L (ref 0.5–2.2)
LACTATE SERPL-SCNC: 3.6 MMOL/L (ref 0.5–2.2)
LEUKOCYTE ESTERASE UR QL STRIP: 500
LYMPHOCYTES # BLD AUTO: 0.32 X10(3)/MCL (ref 0.6–4.6)
LYMPHOCYTES NFR BLD AUTO: 2.1 %
MAGNESIUM SERPL-MCNC: 1.8 MG/DL (ref 1.6–2.6)
MCH RBC QN AUTO: 26.6 PG (ref 27–31)
MCHC RBC AUTO-ENTMCNC: 30.8 G/DL (ref 33–36)
MCV RBC AUTO: 86.1 FL (ref 80–94)
METHGB MFR BLDA: 0.4 %
MONOCYTES # BLD AUTO: 0.67 X10(3)/MCL (ref 0.1–1.3)
MONOCYTES NFR BLD AUTO: 4.4 %
MUCOUS THREADS URNS QL MICRO: ABNORMAL /LPF
NEUTROPHILS # BLD AUTO: 13.65 X10(3)/MCL (ref 2.1–9.2)
NEUTROPHILS NFR BLD AUTO: 90.4 %
NITRITE UR QL STRIP: NEGATIVE
NRBC BLD AUTO-RTO: 0 %
O2 HB BLOOD GAS (OHS): 93.2 %
OHS QRS DURATION: 86 MS
OHS QTC CALCULATION: 454 MS
OXYHGB MFR BLDA: 9.5 G/DL
PCO2 BLDA: 31 MMHG (ref 20–50)
PH BLDA: 7.43 [PH] (ref 7.3–7.6)
PH UR STRIP: 5.5 [PH]
PLATELET # BLD AUTO: 177 X10(3)/MCL (ref 130–400)
PMV BLD AUTO: 11.7 FL (ref 7.4–10.4)
PO2 BLDA: 196 MMHG
POCT GLUCOSE: 320 MG/DL (ref 70–110)
POCT GLUCOSE: 441 MG/DL (ref 70–110)
POCT GLUCOSE: 442 MG/DL (ref 70–110)
POTASSIUM BLOOD GAS (OHS): 3.7 MMOL/L (ref 3.5–5)
POTASSIUM SERPL-SCNC: 4 MMOL/L (ref 3.5–5.1)
POTASSIUM SERPL-SCNC: 4.3 MMOL/L (ref 3.5–5.1)
PROT SERPL-MCNC: 6.1 GM/DL (ref 5.8–7.6)
PROT UR QL STRIP: ABNORMAL
PROTHROMBIN TIME: 15.6 SECONDS (ref 12.5–14.5)
RBC # BLD AUTO: 4.03 X10(6)/MCL (ref 4.2–5.4)
RBC #/AREA URNS AUTO: ABNORMAL /HPF
SAO2 % BLDA: 99.7 %
SODIUM BLOOD GAS (OHS): 131 MMOL/L (ref 137–145)
SODIUM SERPL-SCNC: 136 MMOL/L (ref 136–145)
SODIUM SERPL-SCNC: 136 MMOL/L (ref 136–145)
SP GR UR STRIP.AUTO: 1.01 (ref 1–1.03)
SQUAMOUS #/AREA URNS LPF: ABNORMAL /HPF
UROBILINOGEN UR STRIP-ACNC: NORMAL
WBC # BLD AUTO: 15.1 X10(3)/MCL (ref 4.5–11.5)
WBC #/AREA URNS AUTO: >100 /HPF
WBC CLUMPS UR QL AUTO: ABNORMAL

## 2025-06-07 PROCEDURE — 83036 HEMOGLOBIN GLYCOSYLATED A1C: CPT

## 2025-06-07 PROCEDURE — 25000003 PHARM REV CODE 250: Performed by: EMERGENCY MEDICINE

## 2025-06-07 PROCEDURE — 63600175 PHARM REV CODE 636 W HCPCS: Performed by: EMERGENCY MEDICINE

## 2025-06-07 PROCEDURE — 96375 TX/PRO/DX INJ NEW DRUG ADDON: CPT

## 2025-06-07 PROCEDURE — 85610 PROTHROMBIN TIME: CPT | Performed by: EMERGENCY MEDICINE

## 2025-06-07 PROCEDURE — 82010 KETONE BODYS QUAN: CPT | Performed by: EMERGENCY MEDICINE

## 2025-06-07 PROCEDURE — 87186 SC STD MICRODIL/AGAR DIL: CPT

## 2025-06-07 PROCEDURE — 87154 CUL TYP ID BLD PTHGN 6+ TRGT: CPT

## 2025-06-07 PROCEDURE — 96374 THER/PROPH/DIAG INJ IV PUSH: CPT | Mod: 59

## 2025-06-07 PROCEDURE — 80053 COMPREHEN METABOLIC PANEL: CPT | Performed by: EMERGENCY MEDICINE

## 2025-06-07 PROCEDURE — 83735 ASSAY OF MAGNESIUM: CPT | Performed by: EMERGENCY MEDICINE

## 2025-06-07 PROCEDURE — 81001 URINALYSIS AUTO W/SCOPE: CPT | Performed by: EMERGENCY MEDICINE

## 2025-06-07 PROCEDURE — 83880 ASSAY OF NATRIURETIC PEPTIDE: CPT | Performed by: EMERGENCY MEDICINE

## 2025-06-07 PROCEDURE — 25000003 PHARM REV CODE 250

## 2025-06-07 PROCEDURE — 83605 ASSAY OF LACTIC ACID: CPT | Performed by: EMERGENCY MEDICINE

## 2025-06-07 PROCEDURE — 63600175 PHARM REV CODE 636 W HCPCS

## 2025-06-07 PROCEDURE — 85025 COMPLETE CBC W/AUTO DIFF WBC: CPT | Performed by: EMERGENCY MEDICINE

## 2025-06-07 PROCEDURE — 96361 HYDRATE IV INFUSION ADD-ON: CPT

## 2025-06-07 PROCEDURE — 82962 GLUCOSE BLOOD TEST: CPT

## 2025-06-07 PROCEDURE — 93005 ELECTROCARDIOGRAM TRACING: CPT

## 2025-06-07 PROCEDURE — 93010 ELECTROCARDIOGRAM REPORT: CPT | Mod: ,,, | Performed by: INTERNAL MEDICINE

## 2025-06-07 PROCEDURE — 20000000 HC ICU ROOM

## 2025-06-07 PROCEDURE — 99285 EMERGENCY DEPT VISIT HI MDM: CPT | Mod: 25

## 2025-06-07 PROCEDURE — 87040 BLOOD CULTURE FOR BACTERIA: CPT | Performed by: EMERGENCY MEDICINE

## 2025-06-07 PROCEDURE — G0390 TRAUMA RESPONS W/HOSP CRITI: HCPCS

## 2025-06-07 PROCEDURE — 36415 COLL VENOUS BLD VENIPUNCTURE: CPT

## 2025-06-07 PROCEDURE — 96365 THER/PROPH/DIAG IV INF INIT: CPT

## 2025-06-07 PROCEDURE — 87086 URINE CULTURE/COLONY COUNT: CPT | Performed by: EMERGENCY MEDICINE

## 2025-06-07 PROCEDURE — 25500020 PHARM REV CODE 255

## 2025-06-07 RX ORDER — ENOXAPARIN SODIUM 100 MG/ML
40 INJECTION SUBCUTANEOUS EVERY 12 HOURS
Status: DISCONTINUED | OUTPATIENT
Start: 2025-06-07 | End: 2025-06-12 | Stop reason: HOSPADM

## 2025-06-07 RX ORDER — NOREPINEPHRINE BITARTRATE 0.03 MG/ML
0-3 INJECTION, SOLUTION INTRAVENOUS CONTINUOUS
Status: DISCONTINUED | OUTPATIENT
Start: 2025-06-07 | End: 2025-06-09

## 2025-06-07 RX ORDER — INSULIN ASPART 100 [IU]/ML
0-5 INJECTION, SOLUTION INTRAVENOUS; SUBCUTANEOUS
Status: DISCONTINUED | OUTPATIENT
Start: 2025-06-07 | End: 2025-06-09

## 2025-06-07 RX ORDER — NALOXONE HCL 0.4 MG/ML
0.02 VIAL (ML) INJECTION
Status: DISCONTINUED | OUTPATIENT
Start: 2025-06-07 | End: 2025-06-12 | Stop reason: HOSPADM

## 2025-06-07 RX ORDER — MUPIROCIN 20 MG/G
OINTMENT TOPICAL 2 TIMES DAILY
Status: CANCELLED | OUTPATIENT
Start: 2025-06-07 | End: 2025-06-12

## 2025-06-07 RX ORDER — IBUPROFEN 200 MG
24 TABLET ORAL
Status: DISCONTINUED | OUTPATIENT
Start: 2025-06-07 | End: 2025-06-09

## 2025-06-07 RX ORDER — SODIUM CHLORIDE 9 MG/ML
INJECTION, SOLUTION INTRAVENOUS
Status: COMPLETED | OUTPATIENT
Start: 2025-06-07 | End: 2025-06-07

## 2025-06-07 RX ORDER — ACETAMINOPHEN 10 MG/ML
1000 INJECTION, SOLUTION INTRAVENOUS ONCE
Status: COMPLETED | OUTPATIENT
Start: 2025-06-07 | End: 2025-06-07

## 2025-06-07 RX ORDER — INSULIN GLARGINE 100 [IU]/ML
20 INJECTION, SOLUTION SUBCUTANEOUS NIGHTLY
Status: DISCONTINUED | OUTPATIENT
Start: 2025-06-07 | End: 2025-06-09

## 2025-06-07 RX ORDER — SODIUM CHLORIDE 0.9 % (FLUSH) 0.9 %
10 SYRINGE (ML) INJECTION EVERY 12 HOURS PRN
Status: DISCONTINUED | OUTPATIENT
Start: 2025-06-07 | End: 2025-06-12 | Stop reason: HOSPADM

## 2025-06-07 RX ORDER — IBUPROFEN 200 MG
16 TABLET ORAL
Status: DISCONTINUED | OUTPATIENT
Start: 2025-06-07 | End: 2025-06-09

## 2025-06-07 RX ORDER — GLUCAGON 1 MG
1 KIT INJECTION
Status: DISCONTINUED | OUTPATIENT
Start: 2025-06-07 | End: 2025-06-09

## 2025-06-07 RX ADMIN — PERFLUTREN 1 ML: 6.52 INJECTION, SUSPENSION INTRAVENOUS at 09:06

## 2025-06-07 RX ADMIN — HUMAN INSULIN 10 UNITS: 100 INJECTION, SOLUTION SUBCUTANEOUS at 06:06

## 2025-06-07 RX ADMIN — ENOXAPARIN SODIUM 40 MG: 100 INJECTION SUBCUTANEOUS at 09:06

## 2025-06-07 RX ADMIN — INSULIN GLARGINE 20 UNITS: 100 INJECTION, SOLUTION SUBCUTANEOUS at 09:06

## 2025-06-07 RX ADMIN — INSULIN ASPART 2 UNITS: 100 INJECTION, SOLUTION INTRAVENOUS; SUBCUTANEOUS at 09:06

## 2025-06-07 RX ADMIN — SODIUM CHLORIDE, POTASSIUM CHLORIDE, SODIUM LACTATE AND CALCIUM CHLORIDE 1000 ML: 600; 310; 30; 20 INJECTION, SOLUTION INTRAVENOUS at 09:06

## 2025-06-07 RX ADMIN — PIPERACILLIN SODIUM AND TAZOBACTAM SODIUM 4.5 G: 4; .5 INJECTION, POWDER, LYOPHILIZED, FOR SOLUTION INTRAVENOUS at 09:06

## 2025-06-07 RX ADMIN — SODIUM CHLORIDE 999 ML/HR: 9 INJECTION, SOLUTION INTRAVENOUS at 02:06

## 2025-06-07 RX ADMIN — HUMAN INSULIN 10 UNITS: 100 INJECTION, SOLUTION SUBCUTANEOUS at 05:06

## 2025-06-07 RX ADMIN — SODIUM CHLORIDE 1000 ML: 9 INJECTION, SOLUTION INTRAVENOUS at 03:06

## 2025-06-07 RX ADMIN — PIPERACILLIN AND TAZOBACTAM 4.5 G: 4; .5 INJECTION, POWDER, LYOPHILIZED, FOR SOLUTION INTRAVENOUS; PARENTERAL at 05:06

## 2025-06-07 RX ADMIN — ACETAMINOPHEN 1000 MG: 10 INJECTION, SOLUTION INTRAVENOUS at 02:06

## 2025-06-07 NOTE — ED NOTES
Pt log rolled maintaining c-spine immobilization. No stepoffs, tenderness, or deformities noted upon palpation per MD Pennie. No neuro changes noted.

## 2025-06-07 NOTE — ED PROVIDER NOTES
Encounter Date: 6/7/2025    SCRIBE #1 NOTE: I, Alice Ashford, am scribing for, and in the presence of,  Donovan Casper MD. I have scribed the following portions of the note - the EKG reading. Other sections scribed: HPI, ROS, PE.       History   No chief complaint on file.    Patient is a 79-year-old female with a PMHx of DM, digestive disorder, and renal disorder presents to the ED via EMS as a level two trauma activation following a fall. EMS reports fell once last night and again at 3:00 this morning. EMS reports patient was normal the first fall but was altered after the second fall. EMS reports patient was warm to the touch and smelled strongly of urine. EMS reports administering fluids prior to arrival. EMS reports patient's CBG was 505. EMS reports patient fell and hit her head last week and was cleared by [facility].    Patient reports feeling warm and achy. She reports back pain.    The history is provided by the patient, medical records and the EMS personnel. The history is limited by the condition of the patient. No  was used.     Review of patient's allergies indicates:   Allergen Reactions    Opioids - morphine analogues Itching    Avocado (laurus persea)      Past Medical History:   Diagnosis Date    Diabetes mellitus     Digestive disorder     Renal disorder      Past Surgical History:   Procedure Laterality Date    COLONOSCOPY N/A 2/7/2023    Procedure: COLON;  Surgeon: Simin Cruz MD;  Location: [facility] ENDOSCOPY;  Service: Gastroenterology;  Laterality: N/A;    COLONOSCOPY  2/7/2023    Procedure: COLONOSCOPY, WITH HEMORRHAGE CONTROL;  Surgeon: Simin Cruz MD;  Location: [facility] ENDOSCOPY;  Service: Gastroenterology;;    COLONOSCOPY N/A 8/21/2023    Procedure: 1000 COLON;  Surgeon: Simin Cruz MD;  Location: [facility] ENDOSCOPY;  Service: Gastroenterology;  Laterality: N/A;    COLONOSCOPY  8/21/2023    Procedure: COLONOSCOPY, WITH HEMORRHAGE CONTROL;  Surgeon: Nancy  MD Simin;  Location: [facility] ENDOSCOPY;  Service: Gastroenterology;;  epi 1 cc    COLONOSCOPY, WITH 1 OR MORE BIOPSIES  2/7/2023    Procedure: COLONOSCOPY, WITH 1 OR MORE BIOPSIES;  Surgeon: Simin Cruz MD;  Location: [facility] ENDOSCOPY;  Service: Gastroenterology;;    COLONOSCOPY, WITH POLYPECTOMY USING SNARE  2/7/2023    Procedure: COLONOSCOPY, WITH POLYPECTOMY USING SNARE;  Surgeon: Simin Cruz MD;  Location: [facility] ENDOSCOPY;  Service: Gastroenterology;;    COLONOSCOPY, WITH POLYPECTOMY USING SNARE  8/21/2023    Procedure: COLONOSCOPY, WITH POLYPECTOMY USING SNARE;  Surgeon: Simin Cruz MD;  Location: [facility] ENDOSCOPY;  Service: Gastroenterology;;     No family history on file.  Social History[1]  Review of Systems   Unable to perform ROS: Mental status change       Physical Exam     Initial Vitals   BP Pulse Resp Temp SpO2   06/07/25 1430 06/07/25 1430 06/07/25 1430 06/07/25 1430 06/07/25 1355   107/88 (!) 139 (!) 26 (!) 102.8 °F (39.3 °C) 97 %      MAP       --                Physical Exam    Nursing note and vitals reviewed.  Constitutional: She appears well-developed. She appears lethargic. She is Obese .   Airway intact.     HENT:   Head: Normocephalic. Mouth/Throat: Oropharynx is clear and moist.   Foodstuff appearing liquid to right side of mouth.   Eyes: Pupils are equal, round, and reactive to light.   Pupils 6 mm and reactive bilaterally.   Neck: Neck supple.   Normal range of motion.  Cardiovascular:  Regular rhythm, normal heart sounds and intact distal pulses.   Tachycardia present.         Pulses:       Radial pulses are 2+ on the right side and 2+ on the left side.        Dorsalis pedis pulses are 2+ on the right side and 2+ on the left side.   Pulmonary/Chest: Breath sounds normal. She exhibits no tenderness.   Bilateral breath sounds.   Abdominal: Abdomen is soft. Bowel sounds are normal. There is no abdominal tenderness. There is no rebound.   Musculoskeletal:         General:  Normal range of motion.      Cervical back: Normal range of motion and neck supple.      Comments: Old appearing bruise to left knee.  Abrasion to right elbow.  Skin tear to right upper forearm.  No hip tenderness.  No midline tenderness, stepoffs, deformities.     Neurological: She has normal strength. She appears lethargic. No sensory deficit. GCS eye subscore is 3. GCS verbal subscore is 4. GCS motor subscore is 6.   Skin: Skin is warm and dry.   Warm to touch.   Psychiatric: She has a normal mood and affect.         ED Course   Critical Care    Date/Time: 6/7/2025 2:58 PM    Performed by: Donovan Casper MD  Authorized by: Donovan Casper MD  Direct patient critical care time: 15 minutes  Additional history critical care time: 5 minutes  Ordering / reviewing critical care time: 20 minutes  Documentation critical care time: 15 minutes  Consulting other physicians critical care time: 5 minutes  Consult with family critical care time: 10 minutes  Other critical care time: 0 minutes  Total critical care time (exclusive of procedural time) : 70 minutes  Critical care time was exclusive of separately billable procedures and treating other patients and teaching time.  Critical care was necessary to treat or prevent imminent or life-threatening deterioration of the following conditions: sepsis and CNS failure or compromise.  Critical care was time spent personally by me on the following activities: development of treatment plan with patient or surrogate, discussions with consultants, discussions with primary provider, interpretation of cardiac output measurements, evaluation of patient's response to treatment, obtaining history from patient or surrogate, examination of patient, ordering and performing treatments and interventions, ordering and review of laboratory studies, ordering and review of radiographic studies, pulse oximetry, re-evaluation of patient's condition and review of old charts.        Labs  Reviewed   COMPREHENSIVE METABOLIC PANEL - Abnormal       Result Value    Sodium 136      Potassium 4.3      Chloride 100      CO2 15 (*)     Glucose 441 (*)     Blood Urea Nitrogen 43.4 (*)     Creatinine 2.00 (*)     Calcium 9.1      Protein Total 6.1      Albumin 2.6 (*)     Globulin 3.5      Albumin/Globulin Ratio 0.7 (*)     Bilirubin Total 2.2 (*)     ALP 90      ALT 14      AST 19      eGFR 26      Anion Gap 21.0      BUN/Creatinine Ratio 22     LACTIC ACID, PLASMA - Abnormal    Lactic Acid Level 3.6 (*)    URINALYSIS, REFLEX TO URINE CULTURE - Abnormal    Color, UA Yellow      Appearance, UA Turbid (*)     Specific Gravity, UA 1.013      pH, UA 5.5      Protein, UA 1+ (*)     Glucose, UA Normal      Ketones, UA Negative      Blood, UA 3+ (*)     Bilirubin, UA Negative      Urobilinogen, UA Normal      Nitrites, UA Negative      Leukocyte Esterase,  (*)     RBC, UA 0-5      WBC, UA >100 (*)     WBC Clumps, UA Moderate (*)     Bacteria, UA Many (*)     Squamous Epithelial Cells, UA Trace      Mucous, UA Occasional (*)    PROTIME-INR - Abnormal    PT 15.6 (*)     INR 1.2      Narrative:     Protimes are used to monitor anticoagulant agents such as warfarin. PT INR values are based on the current patient normal mean and the VIOLETA value for the specific instrument reagent used.  **Routine theraputic target values for the INR are 2.0-3.0**   CBC WITH DIFFERENTIAL - Abnormal    WBC 15.10 (*)     RBC 4.03 (*)     Hgb 10.7 (*)     Hct 34.7 (*)     MCV 86.1      MCH 26.6 (*)     MCHC 30.8 (*)     RDW 16.8      Platelet 177      MPV 11.7 (*)     Neut % 90.4      Lymph % 2.1      Mono % 4.4      Eos % 0.5      Basophil % 0.4      Imm Grans % 2.2      Neut # 13.65 (*)     Lymph # 0.32 (*)     Mono # 0.67      Eos # 0.07      Baso # 0.06      Imm Gran # 0.33 (*)     NRBC% 0.0     B-TYPE NATRIURETIC PEPTIDE - Abnormal    Natriuretic Peptide 226.5 (*)    BETA - HYDROXYBUTYRATE, SERUM - Abnormal    Beta Hydroxybutyrate  2.00 (*)    BLOOD GAS - Abnormal    Sample Type Venous Blood      Drawn by RN      pH, Blood gas 7.430      pCO2, Blood gas 31.0      pO2, Blood gas 196.0      Sodium, Blood Gas 131 (*)     Potassium, Blood Gas 3.7      Calcium Level Ionized 1.16      TOC2, Blood gas 21.6      Base Excess, Blood gas -3.10      sO2, Blood gas 99.7      HCO3, Blood gas 20.6      THb, Blood gas 9.5      O2 Hb, Blood Gas 93.2      CO Hgb 3.5      Met Hgb 0.4     POCT GLUCOSE - Abnormal    POCT Glucose 442 (*)    POCT GLUCOSE - Abnormal    POCT Glucose 441 (*)    MAGNESIUM - Normal    Magnesium Level 1.80     LACTIC ACID, PLASMA - Normal    Lactic Acid Level 1.2     CBC W/ AUTO DIFFERENTIAL    Narrative:     The following orders were created for panel order CBC auto differential.  Procedure                               Abnormality         Status                     ---------                               -----------         ------                     CBC with Differential[[id number]]       Abnormal            Final result                 Please view results for these tests on the individual orders.   POCT GLUCOSE, HAND-HELD DEVICE     EKG Readings: (Independently Interpreted)   Initial Reading: No STEMI. Previous EKG Date: 3/24/25. Rhythm: Sinus Tachycardia. Heart Rate: 126. Ectopy: PVCs (occasional). Conduction: Normal. ST Segments: Normal ST Segments. T Waves: Normal. Clinical Impression: Sinus Tachycardia with PVCs   Done at 15:15 on 6/7/25     ECG Results              EKG 12-lead (Final result)        Collection Time Result Time QRS Duration OHS QTC Calculation    06/07/25 15:15:41 06/07/25 15:50:09 86 454                     Final result by Interface, Lab In [facility] (06/07/25 15:50:18)                   Narrative:    Test Reason : A41.9,    Vent. Rate : 126 BPM     Atrial Rate : 126 BPM     P-R Int : 158 ms          QRS Dur :  86 ms      QT Int : 314 ms       P-R-T Axes :  98 -23  60 degrees    QTcB Int : 454 ms    Sinus  tachycardia with occasional Premature ventricular complexes  Otherwise normal ECG  No previous ECGs available  Confirmed by Graham Torres ([id number]) on 6/7/2025 3:50:08 PM    Referred By:            Confirmed By: Graham Torres                                  Imaging Results              CT Head Without Contrast (Final result)  Result time 06/07/25 15:19:26      Final result by Maxime Tinoco MD (06/07/25 15:19:26)                   Impression:      1.  No acute intracranial findings identified.    2.  Chronic microangiopathic ischemia and atrophy.      Electronically signed by: Maxime Tinoco  Date:    06/07/2025  Time:    15:19               Narrative:    EXAMINATION:  CT HEAD WITHOUT CONTRAST    CLINICAL HISTORY:  Mental status change, unknown cause;    TECHNIQUE:  Sequential axial images were performed of the brain without contrast.    Dose product length of 1008 mGycm. Automated exposure control was utilized to minimize radiation dose.    COMPARISON:  May 23, 2023.    FINDINGS:  There is no intracranial mass effect, midline shift, hydrocephalus or hemorrhage.  Posterior fossa images are degraded by artifacts.  There is no sulcal effacement or low attenuation changes to suggest recent large vessel territory infarction. Chronic appearing periventricular and subcortical white matter low attenuation changes are present and are consistent with chronic microangiopathic ischemia. The ventricular system and sulcal markings prominence is consistent with atrophy. There is no acute extra axial fluid collection.    Visualized paranasal sinuses are clear without mucosal thickening, polypoidal abnormality or air-fluid levels. Mastoid air cells aeration is optimal.                                       X-Ray Chest AP Portable (Final result)  Result time 06/07/25 14:55:19      Final result by Maxime Tinoco MD (06/07/25 14:55:19)                   Impression:      As above.      Electronically signed by: Maxime  Tinoco  Date:    06/07/2025  Time:    14:55               Narrative:    EXAMINATION:  XR CHEST AP PORTABLE    CLINICAL HISTORY:  Fever;    TECHNIQUE:  One view    COMPARISON:  May 24, 25.    FINDINGS:  Cardiopericardial silhouette enlarged appearance similar.  Lungs hypoventilatory changes accentuates interstitial markings.  Bilateral lungs ground-glass and fine reticulonodular changes pattern suggest some pulmonary edema.  There is no typical dense consolidation focally or segmentally.  No significant fluid within the pleural spaces.                                       Medications   sodium chloride 0.9% flush 10 mL (has no administration in time range)   naloxone 0.4 mg/mL injection 0.02 mg (has no administration in time range)   enoxaparin injection 40 mg (40 mg Subcutaneous Given 6/10/25 2020)   acetaminophen tablet 650 mg (650 mg Oral Given 6/8/25 0355)   mupirocin 2 % ointment ( Nasal Given 6/10/25 2042)   sodium chloride 0.9% flush 10 mL (has no administration in time range)   dextrose 5 % and 0.45 % NaCl infusion (0 mL/hr Intravenous Stopped 6/9/25 0117)   cefTRIAXone injection 2 g (2 g Intravenous Given 6/10/25 1544)   electrolytes-dextrose (Pedialyte) oral solution 400 mL (400 mLs Oral Given 6/11/25 0501)   allopurinoL tablet 100 mg (100 mg Oral Given 6/10/25 0846)   ARIPiprazole tablet 10 mg (10 mg Oral Given 6/10/25 0846)   aspirin EC tablet 81 mg (81 mg Oral Given 6/10/25 0846)   ferrous sulfate tablet 1 each (1 each Oral Given 6/10/25 0846)   sertraline tablet 100 mg (100 mg Oral Given 6/10/25 0846)   zinc oxide-cod liver oil 40 % paste ( Topical (Top) Given 6/10/25 2042)   miconazole NITRATE 2 % top powder ( Topical (Top) Given 6/10/25 2042)   insulin aspart U-100 injection 0-10 Units (4 Units Subcutaneous Given 6/10/25 2020)   insulin glargine U-100 (Lantus) injection 40 Units (has no administration in time range)   acetaminophen 1,000 mg/100 mL (10 mg/mL) injection 1,000 mg (0 mg Intravenous  Stopped 6/7/25 1455)   0.9% NaCl infusion (0 mL/hr Intravenous Stopped 6/7/25 1719)   sodium chloride 0.9% bolus 1,000 mL 1,000 mL (0 mLs Intravenous Stopped 6/7/25 1719)   insulin regular injection 10 Units 0.1 mL (10 Units Intravenous Given 6/7/25 1726)   piperacillin-tazobactam (ZOSYN) 4.5 g in D5W 100 mL IVPB (MB+) (0 g Intravenous Stopped 6/7/25 1758)   insulin regular injection 10 Units 0.1 mL (10 Units Intravenous Given 6/7/25 1843)   lactated ringers bolus 1,000 mL (0 mLs Intravenous Stopped 6/7/25 2317)   perflutren lipid microspheres injection 1 mL (1 mL Intravenous Given 6/7/25 2145)   lactated ringers bolus 250 mL (0 mLs Intravenous Stopped 6/8/25 1039)   kit prep Tc 99m-pentetic acid (aerosol) 20 mg SolR 32.9 millicurie (32.9 millicuries Inhalation Given 6/8/25 1544)   kit for prep of Tc-99m-albumin 2.5 mg SolR 4.6 millicurie (4.6 millicuries Intravenous Given 6/8/25 1605)   potassium chloride 10 mEq in 100 mL IVPB (0 mEq Intravenous Stopped 6/8/25 2201)   potassium chloride 10 mEq in 100 mL IVPB (0 mEq Intravenous Stopped 6/8/25 2039)   potassium chloride 10 mEq in 100 mL IVPB (0 mEq Intravenous Stopped 6/9/25 1308)     Medical Decision Making  The differential diagnosis includes, but is not limited to, UTI, sepsis, cellulitis, pneumonia, viral syndrome, renal failure, and metabolic disease.    Amount and/or Complexity of Data Reviewed  Independent Historian: EMS     Details: EMS reports fell once last night and again at 3:00 this morning. EMS reports patient was normal the first fall but was altered after the second fall. EMS reports patient was warm to the touch and smelled strongly of urine. EMS reports administering fluids prior to arrival. EMS reports patient's CBG was 505. EMS reports patient fell and hit her head last week and was cleared by [facility].  Labs: ordered. Decision-making details documented in ED Course.  Radiology: ordered. Decision-making details documented in ED  Course.  ECG/medicine tests: ordered and independent interpretation performed.     Details: No STEMI. Previous EKG Date: 3/24/25. Rhythm: Sinus Tachycardia. Heart Rate: 126. Ectopy: PVCs (occasional). Conduction: Normal. ST Segments: Normal ST Segments. T Waves: Normal. Clinical Impression: Sinus Tachycardia with PVCs   Done at 15:15 on 6/7/25   Discussion of management or test interpretation with external provider(s): Hospitalist consulted for admission    Risk  OTC drugs.  Prescription drug management.  Decision regarding hospitalization.    Critical Care  Total time providing critical care: 70 minutes      Additional MDM:   Sepsis:   This patient does have evidence of infective focus  My overall impression is sepsis with septic shock.  Source: Unknown  Antibiotics given- Antibiotics     Patient Encounter Information Not Found      Latest lactate reviewed- 3.6  Organ dysfunction indicated by Acute kidney injury    Fluid challenge Ideal Body Weight- The patient is obese (BMI>30) and their ideal body weight of [unfilled] will be used to calculate fluid bolus of 30 ml/kg.     Post- resuscitation assessment Yes - I attest a sepsis perfusion exam was performed within 6 hours of sepsis, severe sepsis, or septic shock presentation, following fluid resuscitation.      Will Not start Pressors- Levophed for MAP of 65  Source control achieved by:              Scribe Attestation:   Scribe #1: I performed the above scribed service and the documentation accurately describes the services I performed. I attest to the accuracy of the note.    Attending Attestation:           Physician Attestation for Scribe:  Physician Attestation Statement for Scribe #1: I, Donovan Casper MD, reviewed documentation, as scribed by Claudia Ashford in my presence, and it is both accurate and complete.             ED Course as of 06/11/25 0512   Sat Jun 07, 2025   1639 Patient's blood pressure dropped in the 90s, at this point in time certainly look and a  historical blood pressures I do think the patient meets criteria for septic shock.  Ordered normal saline at 30 cc/kilogram ideal body weigh,t secondary to her elevated BMI, and patient will have received that after she receives her 2 L of normal saline. [MP]   1643 Of note patient still has map greater than 65 so will not start pressor [MP]   1733 Patient's CBC shows elevated white count of 15   Lactic acid is elevated at 3.6   CMP shows elevated blood glucose of 441 with CO2 of 15.  Patient has not anion gap of 21.  Patient also has an elevated BUN of 43 and elevated creatinine of 2.00.  Patient with beta hydroxybutyrate of 2.0 which is elevated.    Patient with metabolic acidosis elevated anion gap, which may be likely to sepsis and lactic acidosis, MYLA with elevated BUN creatinine with some component of the patient having very mild DKA with slightly elevated beta hydroxybutyrate. [MP]   1817 Patient's blood gas shows pH of 7.43, lactic acid improved to 1.2.  At this time, patient maintain in her blood pressure and with improved labs, hospitalist consulted for admission [MP]   1818 Urinalysis demonstrates UTI. [MP]   1835 Patient has dropped her map again to 60, followed by 64.  I am concerned about starting her on pressors but I do think that she needs admission to the ICU. [MP]   1846 Patient's blood pressure dropped to 83/62, will start norepinephrine [MP]      ED Course User Index  [MP] Donovan Casper MD               Medical Decision Making:   Clinical Tests:   Sepsis Perfusion Assessment: "I attest a sepsis perfusion exam was performed within 6 hours of sepsis, severe sepsis, or septic shock presentation, following fluid resuscitation."             Clinical Impression:  Final diagnoses:  [Z13.6] Screening for cardiovascular condition  [A41.9] Sepsis  [N39.0] Urinary tract infection without hematuria, site unspecified (Primary)  [A41.9] Sepsis, due to unspecified organism, unspecified whether acute  organ dysfunction present  [N17.9] MYLA (acute kidney injury)  [E87.20] Metabolic acidosis  [R73.9] Hyperglycemia  [A41.9, R65.20] Severe sepsis          ED Disposition Condition    Admit                       Donovan Casper MD  06/07/25 1822       [1]   Social History  Tobacco Use    Smoking status: Never    Smokeless tobacco: Never   Substance Use Topics    Alcohol use: Not Currently        Donovan Casper MD  06/11/25 0548

## 2025-06-07 NOTE — CONSULTS
"   Trauma Surgery   Activation Note    Patient Name: Vi Sultana  MRN: [id number]   YOB: 1955  Date: 06/07/2025    LEVEL 2 TRAUMA     Subjective:   History of present illness: Patient is an approximately 69 year old female who presents to the ER from home via ground EMS as a level 2 trauma after falling out of bed. Per EMS report patient had a fall approximately 1 week ago and was seen at an OSH with negative head CT then fell out of bed this morning around 0300, since that time patient has progressively gotten more lethargic and mildly confused. GCS 13. Bruising to left knee and shin otherwise no signs of trauma. 102.8 oral temperature    Primary Survey:  A intact   B NWOB   C RR   D GCS 13(E 3, V 4, M 6)    E exposed, log-rolled and examined (see below)   F See below     VITAL SIGNS: 24 HR MIN & MAX LAST   Temp  Min: 101.8 °F (38.8 °C)  Max: 102.8 °F (39.3 °C)  (!) 101.8 °F (38.8 °C)   BP  Min: 91/68  Max: 108/52  101/63    Pulse  Min: 115  Max: 142  (!) 115    Resp  Min: 22  Max: 29  (!) 22    SpO2  Min: 92 %  Max: 98 %  96 %      HT: 5' 8" (172.7 cm)  WT: 127 kg (280 lb)  BMI: 42.6     FAST: deferred    Medications/transfusions received en-route: NA  Medications/transfusions received in trauma bay: Tylenol, NS    Scheduled Meds:    sodium chloride 0.9%  1,000 mL Intravenous ED 1 Time      Continuous Infusions:        PRN Meds:      ROS: unable to assess secondary to patients condition     Allergies: opioids  PMH: Unknown  PSH: Unknown  Social history: Unknown  Objective:   Secondary Survey:   General: Well developed, well nourished, no acute distress, AAOx3  Neuro: CNII-XII grossly intact  HEENT:  Normocephalic, atraumatic, PERRL  CV:  RRR  Pulse: 2+ RP b/l, 2+ DP b/l   Resp/chest:  Non-labored breathing, satting on nasal cannula  GI:  Abdomen obese, soft, non-tender, non-distended  :  Normal external female genitalia, there is no blood at urethral meatus.   Extremities: Moves all 4 " "spontaneously and purposefully, no obvious gross deformities.  Back/Spine: No bony TTP, no palpable step offs or deformities.  Cervical back: Normal. No tenderness.  Thoracic back: Normal. No tenderness.  Lumbar back: Normal. No tenderness.  Skin/wounds:  Hot, well perfused, bruising to left knee, shin  Psych: lethargic    Labs:  Troponin:  No results for input(s): "TROPONINI" in the last 72 hours.  CBC:  Recent Labs     06/07/25  1444   WBC 15.10*   RBC 4.03*   HGB 10.7*   HCT 34.7*      MCV 86.1   MCH 26.6*   MCHC 30.8*     CMP:  Recent Labs     06/07/25  1444   *   CALCIUM 9.1   ALBUMIN 2.6*   PROT 6.1      K 4.3   CO2 15*      BUN 43.4*   CREATININE 2.00*   ALKPHOS 90   ALT 14   AST 19   BILITOT 2.2*     Lactic Acid:  Recent Labs     06/07/25  1446   LACTATE 3.6*     ETOH:  No results for input(s): "ETHANOL" in the last 72 hours.   Urine Drug Screen:  No results for input(s): "COCAINE", "OPIATE", "BARBITURATE", "AMPHETAMINE", "FENTANYL", "CANNABINOIDS", "MDMA" in the last 72 hours.    Invalid input(s): "BENZODIAZEPINE", "PHENCYCLIDINE"   ABG:  No results for input(s): "PH", "PO2", "PCO2", "HCO3", "BE" in the last 168 hours.   I have reviewed all pertinent lab results within the past 24 hours.    Imaging:  Imaging Results              CT Head Without Contrast (Final result)  Result time 06/07/25 15:19:26      Final result by Maxime Tinoco MD (06/07/25 15:19:26)                   Impression:      1.  No acute intracranial findings identified.    2.  Chronic microangiopathic ischemia and atrophy.      Electronically signed by: Maxime Tinoco  Date:    06/07/2025  Time:    15:19               Narrative:    EXAMINATION:  CT HEAD WITHOUT CONTRAST    CLINICAL HISTORY:  Mental status change, unknown cause;    TECHNIQUE:  Sequential axial images were performed of the brain without contrast.    Dose product length of 1008 mGycm. Automated exposure control was utilized to minimize radiation " dose.    COMPARISON:  May 23, 2023.    FINDINGS:  There is no intracranial mass effect, midline shift, hydrocephalus or hemorrhage.  Posterior fossa images are degraded by artifacts.  There is no sulcal effacement or low attenuation changes to suggest recent large vessel territory infarction. Chronic appearing periventricular and subcortical white matter low attenuation changes are present and are consistent with chronic microangiopathic ischemia. The ventricular system and sulcal markings prominence is consistent with atrophy. There is no acute extra axial fluid collection.    Visualized paranasal sinuses are clear without mucosal thickening, polypoidal abnormality or air-fluid levels. Mastoid air cells aeration is optimal.                                       X-Ray Chest AP Portable (Final result)  Result time 06/07/25 14:55:19      Final result by Maxime Tinoco MD (06/07/25 14:55:19)                   Impression:      As above.      Electronically signed by: Maxime Tinoco  Date:    06/07/2025  Time:    14:55               Narrative:    EXAMINATION:  XR CHEST AP PORTABLE    CLINICAL HISTORY:  Fever;    TECHNIQUE:  One view    COMPARISON:  May 24, 25.    FINDINGS:  Cardiopericardial silhouette enlarged appearance similar.  Lungs hypoventilatory changes accentuates interstitial markings.  Bilateral lungs ground-glass and fine reticulonodular changes pattern suggest some pulmonary edema.  There is no typical dense consolidation focally or segmentally.  No significant fluid within the pleural spaces.                                     I have reviewed all pertinent imaging results/findings within the past 24 hours.    Assessment & Plan:   Patient is a 69 year old female who fell out of bed this morning and has become increasingly lethargic and confused as the day has progressed. No outward signs of injury except for a bruise to the left knee. However, patient is running a temperature of 102.8. Head CT shows not  acute intracranial abnormality. There are no acute traumatic injuries that warrant Trauma services at this time. Will leave disposition to the ER.      [facility]   Trauma Surgery

## 2025-06-08 LAB
ACB COMPLEX DNA BLD POS QL NAA+NON-PROBE: NOT DETECTED
ALBUMIN SERPL-MCNC: 2.4 G/DL (ref 3.4–4.8)
ALBUMIN/GLOB SERPL: 0.6 RATIO (ref 1.1–2)
ALP SERPL-CCNC: 101 UNIT/L (ref 40–150)
ALT SERPL-CCNC: 14 UNIT/L (ref 0–55)
ANION GAP SERPL CALC-SCNC: 11 MEQ/L
ANION GAP SERPL CALC-SCNC: 11 MEQ/L
ANION GAP SERPL CALC-SCNC: 14 MEQ/L
APICAL FOUR CHAMBER EJECTION FRACTION: 47 %
APICAL TWO CHAMBER EJECTION FRACTION: 45 %
AST SERPL-CCNC: 19 UNIT/L (ref 11–45)
AV INDEX (PROSTH): 0.62
AV MEAN GRADIENT: 5 MMHG
AV PEAK GRADIENT: 9 MMHG
AV VALVE AREA BY VELOCITY RATIO: 2.5 CM²
AV VALVE AREA: 2.4 CM²
AV VELOCITY RATIO: 0.67
B FRAGILIS DNA BLD POS QL NAA+PROBE: NOT DETECTED
B PERT.PT PRMT NPH QL NAA+NON-PROBE: NOT DETECTED
B-OH-BUTYR SERPL-MCNC: 0.2 MMOL/L
B-OH-BUTYR SERPL-MCNC: 0.4 MMOL/L
B-OH-BUTYR SERPL-MCNC: 0.6 MMOL/L
BASOPHILS # BLD AUTO: 0.03 X10(3)/MCL
BASOPHILS NFR BLD AUTO: 0.4 %
BILIRUB SERPL-MCNC: 1.4 MG/DL
BSA FOR ECHO PROCEDURE: 2.47 M2
BUN SERPL-MCNC: 44.8 MG/DL (ref 9.8–20.1)
BUN SERPL-MCNC: 53.4 MG/DL (ref 9.8–20.1)
BUN SERPL-MCNC: 54.5 MG/DL (ref 9.8–20.1)
C ALBICANS DNA BLD POS QL NAA+PROBE: NOT DETECTED
C AURIS DNA BLD POS QL NAA+NON-PROBE: NOT DETECTED
C GATTII+NEOFOR DNA CSF QL NAA+NON-PROBE: NOT DETECTED
C GLABRATA DNA BLD POS QL NAA+PROBE: NOT DETECTED
C KRUSEI DNA BLD POS QL NAA+PROBE: NOT DETECTED
C PARAP DNA BLD POS QL NAA+PROBE: NOT DETECTED
C PNEUM DNA NPH QL NAA+NON-PROBE: NOT DETECTED
C TROPICLS DNA BLD POS QL NAA+PROBE: NOT DETECTED
CALCIUM SERPL-MCNC: 8.4 MG/DL (ref 8.4–10.2)
CALCIUM SERPL-MCNC: 8.6 MG/DL (ref 8.4–10.2)
CALCIUM SERPL-MCNC: 9.3 MG/DL (ref 8.4–10.2)
CHLORIDE SERPL-SCNC: 106 MMOL/L (ref 98–107)
CHLORIDE SERPL-SCNC: 106 MMOL/L (ref 98–107)
CHLORIDE SERPL-SCNC: 107 MMOL/L (ref 98–107)
CO2 SERPL-SCNC: 19 MMOL/L (ref 23–31)
CO2 SERPL-SCNC: 20 MMOL/L (ref 23–31)
CO2 SERPL-SCNC: 21 MMOL/L (ref 23–31)
COLISTIN RES MCR-1 ISLT/SPM QL: NOT DETECTED
CREAT SERPL-MCNC: 1.93 MG/DL (ref 0.55–1.02)
CREAT SERPL-MCNC: 2.01 MG/DL (ref 0.55–1.02)
CREAT SERPL-MCNC: 2.04 MG/DL (ref 0.55–1.02)
CREAT/UREA NIT SERPL: 22
CREAT/UREA NIT SERPL: 27
CREAT/UREA NIT SERPL: 28
CV ECHO LV RWT: 0.5 CM
DOP CALC AO PEAK VEL: 1.5 M/S
DOP CALC AO VTI: 31.1 CM
DOP CALC LVOT AREA: 3.8 CM2
DOP CALC LVOT DIAMETER: 2.2 CM
DOP CALC LVOT PEAK VEL: 1 M/S
DOP CALC LVOT STROKE VOLUME: 73.3 CM3
DOP CALC MV VTI: 24.7 CM
DOP CALCLVOT PEAK VEL VTI: 19.3 CM
E CLOAC COMP DNA BLD POS QL NAA+PROBE: NOT DETECTED
E COLI DNA BLD POS QL NAA+PROBE: NOT DETECTED
E FAECALIS+OTHR E SP RRNA BLD POS FISH: NOT DETECTED
E FAECIUM HSP60 BLD POS QL PROBE: NOT DETECTED
E WAVE DECELERATION TIME: 211 MSEC
E/A RATIO: 0.86
E/E' RATIO: 9 M/S
ECHO LV POSTERIOR WALL: 1.3 CM (ref 0.6–1.1)
ENTEROBACTERALES DNA BLD POS NAA+N-PRB: ABNORMAL
EOSINOPHIL # BLD AUTO: 0.22 X10(3)/MCL (ref 0–0.9)
EOSINOPHIL NFR BLD AUTO: 3.1 %
ERYTHROCYTE [DISTWIDTH] IN BLOOD BY AUTOMATED COUNT: 16.7 % (ref 11.5–17)
ESBL CFT TO CFT-CLAV IC RTO BD POS IMP: NOT DETECTED
FRACTIONAL SHORTENING: 32.7 % (ref 28–44)
GFR SERPLBLD CREATININE-BSD FMLA CKD-EPI: 26 ML/MIN/1.73/M2
GFR SERPLBLD CREATININE-BSD FMLA CKD-EPI: 26 ML/MIN/1.73/M2
GFR SERPLBLD CREATININE-BSD FMLA CKD-EPI: 28 ML/MIN/1.73/M2
GLOBULIN SER-MCNC: 4.1 GM/DL (ref 2.4–3.5)
GLUCOSE SERPL-MCNC: 133 MG/DL (ref 82–115)
GLUCOSE SERPL-MCNC: 143 MG/DL (ref 82–115)
GLUCOSE SERPL-MCNC: 225 MG/DL (ref 82–115)
GP B STREP DNA CSF QL NAA+NON-PROBE: NOT DETECTED
HADV DNA NPH QL NAA+NON-PROBE: NOT DETECTED
HAEM INFLU DNA CSF QL NAA+NON-PROBE: NOT DETECTED
HCOV 229E RNA NPH QL NAA+NON-PROBE: NOT DETECTED
HCOV HKU1 RNA NPH QL NAA+NON-PROBE: NOT DETECTED
HCOV NL63 RNA NPH QL NAA+NON-PROBE: NOT DETECTED
HCOV OC43 RNA NPH QL NAA+NON-PROBE: NOT DETECTED
HCT VFR BLD AUTO: 33.9 % (ref 37–47)
HGB BLD-MCNC: 10.1 G/DL (ref 12–16)
HMPV RNA NPH QL NAA+NON-PROBE: NOT DETECTED
HPIV1 RNA NPH QL NAA+NON-PROBE: NOT DETECTED
HPIV2 RNA NPH QL NAA+NON-PROBE: NOT DETECTED
HPIV3 RNA NPH QL NAA+NON-PROBE: NOT DETECTED
HPIV4 RNA NPH QL NAA+NON-PROBE: NOT DETECTED
IMM GRANULOCYTES # BLD AUTO: 0.09 X10(3)/MCL (ref 0–0.04)
IMM GRANULOCYTES NFR BLD AUTO: 1.3 %
IMP CARBAPENEMASE ISLT QL IA.RAPID: NOT DETECTED
INTERVENTRICULAR SEPTUM: 1.2 CM (ref 0.6–1.1)
K OXYTOCA OMPA BLD POS QL PROBE: NOT DETECTED
KLEBSIELLA SP DNA BLD POS QL NAA+NON-PRB: DETECTED
KLEBSIELLA SP DNA BLD POS QL NAA+NON-PRB: NOT DETECTED
KPC CARBAPENEMASE ISLT QL IA.RAPID: NOT DETECTED
L MONOCYTOG DNA CSF QL NAA+NON-PROBE: NOT DETECTED
LEFT ATRIUM AREA SYSTOLIC (APICAL 2 CHAMBER): 21.2 CM2
LEFT ATRIUM AREA SYSTOLIC (APICAL 4 CHAMBER): 20.1 CM2
LEFT ATRIUM SIZE: 3.8 CM
LEFT ATRIUM VOLUME INDEX MOD: 27 ML/M2
LEFT ATRIUM VOLUME MOD: 63 ML
LEFT INTERNAL DIMENSION IN SYSTOLE: 3.5 CM (ref 2.1–4)
LEFT VENTRICLE DIASTOLIC VOLUME INDEX: 53.81 ML/M2
LEFT VENTRICLE DIASTOLIC VOLUME: 127 ML
LEFT VENTRICLE END DIASTOLIC VOLUME APICAL 2 CHAMBER: 102 ML
LEFT VENTRICLE END DIASTOLIC VOLUME APICAL 4 CHAMBER: 180 ML
LEFT VENTRICLE END SYSTOLIC VOLUME APICAL 2 CHAMBER: 60.1 ML
LEFT VENTRICLE END SYSTOLIC VOLUME APICAL 4 CHAMBER: 60.8 ML
LEFT VENTRICLE MASS INDEX: 111.6 G/M2
LEFT VENTRICLE SYSTOLIC VOLUME INDEX: 21.6 ML/M2
LEFT VENTRICLE SYSTOLIC VOLUME: 51 ML
LEFT VENTRICULAR INTERNAL DIMENSION IN DIASTOLE: 5.2 CM (ref 3.5–6)
LEFT VENTRICULAR MASS: 263.4 G
LV LATERAL E/E' RATIO: 7.6 M/S
LV SEPTAL E/E' RATIO: 11.3 M/S
LVED V (TEICH): 127 ML
LVES V (TEICH): 50.9 ML
LVOT MG: 2 MMHG
LVOT MV: 0.66 CM/S
LYMPHOCYTES # BLD AUTO: 0.38 X10(3)/MCL (ref 0.6–4.6)
LYMPHOCYTES NFR BLD AUTO: 5.4 %
M PNEUMO DNA NPH QL NAA+NON-PROBE: NOT DETECTED
MAGNESIUM SERPL-MCNC: 1.7 MG/DL (ref 1.6–2.6)
MAGNESIUM SERPL-MCNC: 1.8 MG/DL (ref 1.6–2.6)
MAGNESIUM SERPL-MCNC: 1.8 MG/DL (ref 1.6–2.6)
MCH RBC QN AUTO: 26.2 PG (ref 27–31)
MCHC RBC AUTO-ENTMCNC: 29.8 G/DL (ref 33–36)
MCV RBC AUTO: 88.1 FL (ref 80–94)
MECA+MECC NOSE QL NAA+PROBE: ABNORMAL
MECA+MECC+MREJ ISLT/SPM QL: ABNORMAL
MONOCYTES # BLD AUTO: 0.13 X10(3)/MCL (ref 0.1–1.3)
MONOCYTES NFR BLD AUTO: 1.9 %
MV MEAN GRADIENT: 2 MMHG
MV PEAK A VEL: 0.79 M/S
MV PEAK E VEL: 0.68 M/S
MV PEAK GRADIENT: 4 MMHG
MV STENOSIS PRESSURE HALF TIME: 48 MS
MV VALVE AREA BY CONTINUITY EQUATION: 2.97 CM2
MV VALVE AREA P 1/2 METHOD: 4.58 CM2
N MEN DNA CSF QL NAA+NON-PROBE: NOT DETECTED
NDM CARBAPENEMASE ISLT QL IA.RAPID: NOT DETECTED
NEUTROPHILS # BLD AUTO: 6.16 X10(3)/MCL (ref 2.1–9.2)
NEUTROPHILS NFR BLD AUTO: 87.9 %
NRBC BLD AUTO-RTO: 0 %
OHS LV EJECTION FRACTION SIMPSONS BIPLANE MOD: 47 %
OXA-48-LIKE CRBPNASE ISLT QL IA.RAPID: NOT DETECTED
P AERUGINOSA DNA BLD POS QL NAA+PROBE: NOT DETECTED
PHOSPHATE SERPL-MCNC: 3.1 MG/DL (ref 2.3–4.7)
PHOSPHATE SERPL-MCNC: 3.2 MG/DL (ref 2.3–4.7)
PHOSPHATE SERPL-MCNC: 3.6 MG/DL (ref 2.3–4.7)
PISA TR MAX VEL: 1.4 M/S
PLATELET # BLD AUTO: 143 X10(3)/MCL (ref 130–400)
PMV BLD AUTO: 12 FL (ref 7.4–10.4)
POCT GLUCOSE: 142 MG/DL (ref 70–110)
POCT GLUCOSE: 147 MG/DL (ref 70–110)
POCT GLUCOSE: 147 MG/DL (ref 70–110)
POCT GLUCOSE: 165 MG/DL (ref 70–110)
POCT GLUCOSE: 167 MG/DL (ref 70–110)
POCT GLUCOSE: 182 MG/DL (ref 70–110)
POCT GLUCOSE: 198 MG/DL (ref 70–110)
POCT GLUCOSE: 241 MG/DL (ref 70–110)
POCT GLUCOSE: 244 MG/DL (ref 70–110)
POCT GLUCOSE: 253 MG/DL (ref 70–110)
POTASSIUM SERPL-SCNC: 3.3 MMOL/L (ref 3.5–5.1)
POTASSIUM SERPL-SCNC: 3.5 MMOL/L (ref 3.5–5.1)
POTASSIUM SERPL-SCNC: 4.2 MMOL/L (ref 3.5–5.1)
PROT SERPL-MCNC: 6.5 GM/DL (ref 5.8–7.6)
PROTEUS SP DNA BLD POS QL NAA+PROBE: NOT DETECTED
PV PEAK GRADIENT: 6 MMHG
PV PEAK VELOCITY: 1.23 M/S
RBC # BLD AUTO: 3.85 X10(6)/MCL (ref 4.2–5.4)
RSV RNA NPH QL NAA+NON-PROBE: NOT DETECTED
RV+EV RNA NPH QL NAA+NON-PROBE: NOT DETECTED
S AUREUS DNA BLD POS QL NAA+PROBE: NOT DETECTED
S ENT+BONG DNA STL QL NAA+NON-PROBE: NOT DETECTED
S EPIDERMIDIS HSP60 BLD POS QL PROBE: NOT DETECTED
S LUGDUNENSIS SODA BLD POS QL PROBE: NOT DETECTED
S MALTOPH DNA BLD POS QL NAA+PROBE: NOT DETECTED
S MARCESCENS DNA BLD POS QL NAA+PROBE: NOT DETECTED
S PNEUM DNA CSF QL NAA+NON-PROBE: NOT DETECTED
S PYOGENES HSP60 BLD POS QL PROBE: NOT DETECTED
SODIUM SERPL-SCNC: 138 MMOL/L (ref 136–145)
SODIUM SERPL-SCNC: 138 MMOL/L (ref 136–145)
SODIUM SERPL-SCNC: 139 MMOL/L (ref 136–145)
STAPH SP TUF BLD POS QL PROBE: NOT DETECTED
STREPTOCOCCUS SP TUF BLD POS QL PROBE: NOT DETECTED
TDI LATERAL: 0.09 M/S
TDI SEPTAL: 0.06 M/S
TDI: 0.08 M/S
TR MAX PG: 7 MMHG
TRICUSPID ANNULAR PLANE SYSTOLIC EXCURSION: 2.5 CM
VAN(A+B+C1+C2) GENES ISLT/SPM: ABNORMAL
VIM CARBAPENEMASE ISLT QL IA.RAPID: NOT DETECTED
WBC # BLD AUTO: 7.01 X10(3)/MCL (ref 4.5–11.5)
Z-SCORE OF LEFT VENTRICULAR DIMENSION IN END DIASTOLE: -6.57
Z-SCORE OF LEFT VENTRICULAR DIMENSION IN END SYSTOLE: -4.3

## 2025-06-08 PROCEDURE — 36415 COLL VENOUS BLD VENIPUNCTURE: CPT

## 2025-06-08 PROCEDURE — S5010 5% DEXTROSE AND 0.45% SALINE: HCPCS | Performed by: INTERNAL MEDICINE

## 2025-06-08 PROCEDURE — A9540 TC99M MAA: HCPCS | Performed by: INTERNAL MEDICINE

## 2025-06-08 PROCEDURE — 87798 DETECT AGENT NOS DNA AMP: CPT | Performed by: INTERNAL MEDICINE

## 2025-06-08 PROCEDURE — 83735 ASSAY OF MAGNESIUM: CPT

## 2025-06-08 PROCEDURE — 27000221 HC OXYGEN, UP TO 24 HOURS

## 2025-06-08 PROCEDURE — A9567 TECHNETIUM TC-99M AEROSOL: HCPCS | Performed by: INTERNAL MEDICINE

## 2025-06-08 PROCEDURE — 80053 COMPREHEN METABOLIC PANEL: CPT

## 2025-06-08 PROCEDURE — 63600175 PHARM REV CODE 636 W HCPCS: Performed by: INTERNAL MEDICINE

## 2025-06-08 PROCEDURE — 82010 KETONE BODYS QUAN: CPT | Performed by: INTERNAL MEDICINE

## 2025-06-08 PROCEDURE — 83735 ASSAY OF MAGNESIUM: CPT | Performed by: INTERNAL MEDICINE

## 2025-06-08 PROCEDURE — 85025 COMPLETE CBC W/AUTO DIFF WBC: CPT

## 2025-06-08 PROCEDURE — 36415 COLL VENOUS BLD VENIPUNCTURE: CPT | Performed by: INTERNAL MEDICINE

## 2025-06-08 PROCEDURE — 84100 ASSAY OF PHOSPHORUS: CPT | Performed by: INTERNAL MEDICINE

## 2025-06-08 PROCEDURE — 63600175 PHARM REV CODE 636 W HCPCS

## 2025-06-08 PROCEDURE — 25000003 PHARM REV CODE 250

## 2025-06-08 PROCEDURE — 84100 ASSAY OF PHOSPHORUS: CPT

## 2025-06-08 PROCEDURE — 25000003 PHARM REV CODE 250: Performed by: INTERNAL MEDICINE

## 2025-06-08 PROCEDURE — 20000000 HC ICU ROOM

## 2025-06-08 RX ORDER — POTASSIUM CHLORIDE 7.45 MG/ML
10 INJECTION INTRAVENOUS ONCE
Status: COMPLETED | OUTPATIENT
Start: 2025-06-08 | End: 2025-06-08

## 2025-06-08 RX ORDER — MUPIROCIN 20 MG/G
OINTMENT TOPICAL 2 TIMES DAILY
Status: DISCONTINUED | OUTPATIENT
Start: 2025-06-08 | End: 2025-06-12 | Stop reason: HOSPADM

## 2025-06-08 RX ORDER — DEXTROSE MONOHYDRATE AND SODIUM CHLORIDE 5; .45 G/100ML; G/100ML
INJECTION, SOLUTION INTRAVENOUS CONTINUOUS PRN
Status: DISCONTINUED | OUTPATIENT
Start: 2025-06-08 | End: 2025-06-12 | Stop reason: HOSPADM

## 2025-06-08 RX ORDER — MULTIVITAMIN WITH IRON
1 TABLET ORAL 2 TIMES DAILY
COMMUNITY

## 2025-06-08 RX ORDER — ACETAMINOPHEN 325 MG/1
650 TABLET ORAL EVERY 6 HOURS PRN
Status: DISCONTINUED | OUTPATIENT
Start: 2025-06-08 | End: 2025-06-12 | Stop reason: HOSPADM

## 2025-06-08 RX ORDER — SODIUM CHLORIDE 0.9 % (FLUSH) 0.9 %
10 SYRINGE (ML) INJECTION
Status: DISCONTINUED | OUTPATIENT
Start: 2025-06-08 | End: 2025-06-12 | Stop reason: HOSPADM

## 2025-06-08 RX ORDER — SODIUM CHLORIDE 9 MG/ML
1000 INJECTION, SOLUTION INTRAVENOUS CONTINUOUS
Status: DISCONTINUED | OUTPATIENT
Start: 2025-06-08 | End: 2025-06-09

## 2025-06-08 RX ADMIN — DEXTROSE AND SODIUM CHLORIDE: 5; 450 INJECTION, SOLUTION INTRAVENOUS at 07:06

## 2025-06-08 RX ADMIN — INSULIN HUMAN 0.05 UNITS/KG/HR: 1 INJECTION, SOLUTION INTRAVENOUS at 11:06

## 2025-06-08 RX ADMIN — POTASSIUM CHLORIDE 10 MEQ: 7.46 INJECTION, SOLUTION INTRAVENOUS at 07:06

## 2025-06-08 RX ADMIN — ENOXAPARIN SODIUM 40 MG: 100 INJECTION SUBCUTANEOUS at 08:06

## 2025-06-08 RX ADMIN — PIPERACILLIN SODIUM AND TAZOBACTAM SODIUM 4.5 G: 4; .5 INJECTION, POWDER, LYOPHILIZED, FOR SOLUTION INTRAVENOUS at 01:06

## 2025-06-08 RX ADMIN — POTASSIUM CHLORIDE 10 MEQ: 7.46 INJECTION, SOLUTION INTRAVENOUS at 09:06

## 2025-06-08 RX ADMIN — ENOXAPARIN SODIUM 40 MG: 100 INJECTION SUBCUTANEOUS at 09:06

## 2025-06-08 RX ADMIN — SODIUM CHLORIDE, POTASSIUM CHLORIDE, SODIUM LACTATE AND CALCIUM CHLORIDE 250 ML: 600; 310; 30; 20 INJECTION, SOLUTION INTRAVENOUS at 09:06

## 2025-06-08 RX ADMIN — INSULIN ASPART 3 UNITS: 100 INJECTION, SOLUTION INTRAVENOUS; SUBCUTANEOUS at 04:06

## 2025-06-08 RX ADMIN — INSULIN HUMAN 0.05 UNITS/KG/HR: 1 INJECTION, SOLUTION INTRAVENOUS at 07:06

## 2025-06-08 RX ADMIN — ACETAMINOPHEN 650 MG: 325 TABLET ORAL at 03:06

## 2025-06-08 RX ADMIN — KIT FOR THE PREPARATION OF TECHNETIUM TC 99M ALBUMIN AGGREGATED 4.6 MILLICURIE: 2 INJECTION, POWDER, LYOPHILIZED, FOR SUSPENSION INTRAPERITONEAL; INTRAVENOUS at 04:06

## 2025-06-08 RX ADMIN — MUPIROCIN: 20 OINTMENT TOPICAL at 08:06

## 2025-06-08 RX ADMIN — PIPERACILLIN SODIUM AND TAZOBACTAM SODIUM 4.5 G: 4; .5 INJECTION, POWDER, LYOPHILIZED, FOR SOLUTION INTRAVENOUS at 10:06

## 2025-06-08 RX ADMIN — MUPIROCIN: 20 OINTMENT TOPICAL at 09:06

## 2025-06-08 RX ADMIN — KIT FOR THE PREPARATION OF TECHNETIUM TC 99M PENTETATE 32.9 MILLICURIE: 20 INJECTION, POWDER, LYOPHILIZED, FOR SOLUTION INTRAVENOUS; RESPIRATORY (INHALATION) at 03:06

## 2025-06-08 RX ADMIN — DEXTROSE AND SODIUM CHLORIDE: 5; 450 INJECTION, SOLUTION INTRAVENOUS at 11:06

## 2025-06-08 RX ADMIN — PIPERACILLIN SODIUM AND TAZOBACTAM SODIUM 4.5 G: 4; .5 INJECTION, POWDER, LYOPHILIZED, FOR SOLUTION INTRAVENOUS at 05:06

## 2025-06-08 NOTE — PLAN OF CARE
Problem: Adult Inpatient Plan of Care  Goal: Plan of Care Review  Outcome: Progressing  Goal: Patient-Specific Goal (Individualized)  Outcome: Progressing  Goal: Absence of Hospital-Acquired Illness or Injury  Outcome: Progressing  Goal: Optimal Comfort and Wellbeing  Outcome: Progressing  Goal: Readiness for Transition of Care  Outcome: Progressing     Problem: Bariatric Environmental Safety  Goal: Safety Maintained with Care  Outcome: Progressing     Problem: Diabetes Comorbidity  Goal: Blood Glucose Level Within Targeted Range  Outcome: Progressing     Problem: Infection  Goal: Absence of Infection Signs and Symptoms  Outcome: Progressing     Problem: Skin Injury Risk Increased  Goal: Skin Health and Integrity  Outcome: Progressing

## 2025-06-08 NOTE — H&P
[facility] - Emergency Dept  Pulmonary Critical Care Note    Patient Name: Vi Sultana  MRN: [id number]  Admission Date: 6/7/2025  Hospital Length of Stay: 0 days  Code Status: Full Code  Attending Provider: No att. providers found  Primary Care Provider: Teresa Santana MD     Subjective:     HPI:   Patient is an 79-year-old female with past medical history of type 2 diabetes, CKD, and depression who presents to [facility] with altered mental status following a ground level fall x2.  Patient reports she fell last night and again at 3:00 a.m. this morning when leaving her restroom.  She reports difficulty remembering exactly what took place however she denies syncope, loss of consciousness, or head trauma.  Most reported patient has smelled strongly of urine and was warm to the touch upon their arrival.  EMS administered fluids and CBG on-site was 505.  Patient reports a similar fall one-week prior that was evaluated at [facility] ED, found to have no acute bleed.    ED course:  Patient presented febrile 102.8° F, tachycardic 139, tachypneic 26, /88, saturating 92% on room air.  CBC showed leukocytosis 15.1, normocytic anemia H/H 10.7/34.7.  Elevated PT 15.6, normal INR 1.2.  CMP significant for metabolic acidosis bicarb 15, BUN/creatinine 43.4/2.0, glucose 441, albumin 2.6.  Elevated .5.  UA negative for ketones, suggestive of UTI.  Positive beta hydroxybutyrate 2.0.  VBG shows no acidosis.  EKG shows sinus tach with occasional PVCs.  Chest x-ray suggests pulmonary edema.  CT head shows no acute intracranial abnormality.  Patient received 2 L of LR, Zosyn, and 20 units of regular insulin.  ICU consulted for management.    Hospital Course/Significant events:  06/07/2025 admit to ICU    24 Hour Interval History:  -    Past Medical History:   Diagnosis Date    Diabetes mellitus     Digestive disorder     Renal disorder        Past Surgical History:   Procedure Laterality Date    COLONOSCOPY  N/A 2/7/2023    Procedure: COLON;  Surgeon: Simin Cruz MD;  Location: [facility] ENDOSCOPY;  Service: Gastroenterology;  Laterality: N/A;    COLONOSCOPY  2/7/2023    Procedure: COLONOSCOPY, WITH HEMORRHAGE CONTROL;  Surgeon: Simin Cruz MD;  Location: [facility] ENDOSCOPY;  Service: Gastroenterology;;    COLONOSCOPY N/A 8/21/2023    Procedure: 1000 COLON;  Surgeon: Simin Cruz MD;  Location: [facility] ENDOSCOPY;  Service: Gastroenterology;  Laterality: N/A;    COLONOSCOPY  8/21/2023    Procedure: COLONOSCOPY, WITH HEMORRHAGE CONTROL;  Surgeon: Simin Cruz MD;  Location: [facility] ENDOSCOPY;  Service: Gastroenterology;;  epi 1 cc    COLONOSCOPY, WITH 1 OR MORE BIOPSIES  2/7/2023    Procedure: COLONOSCOPY, WITH 1 OR MORE BIOPSIES;  Surgeon: Simin Cruz MD;  Location: [facility] ENDOSCOPY;  Service: Gastroenterology;;    COLONOSCOPY, WITH POLYPECTOMY USING SNARE  2/7/2023    Procedure: COLONOSCOPY, WITH POLYPECTOMY USING SNARE;  Surgeon: Simin Cruz MD;  Location: [facility] ENDOSCOPY;  Service: Gastroenterology;;    COLONOSCOPY, WITH POLYPECTOMY USING SNARE  8/21/2023    Procedure: COLONOSCOPY, WITH POLYPECTOMY USING SNARE;  Surgeon: Simin Cruz MD;  Location: [facility] ENDOSCOPY;  Service: Gastroenterology;;       Social History[1]        Current Outpatient Medications   Medication Instructions    allopurinoL (ZYLOPRIM) 100 mg, Daily    ARIPiprazole (ABILIFY) 10 mg, Daily    aspirin (ECOTRIN) 81 mg, Daily    ciprofloxacin HCl (CIPRO) 500 mg, Oral, 2 times daily    ferrous gluconate (FERGON) 324 mg, With breakfast    furosemide (LASIX) 40 mg, Three times weekly    rosuvastatin (CRESTOR) 10 mg, Nightly    sertraline (ZOLOFT) 100 mg, Daily    VICTOZA 2-VIKRAM 0.6 mg, Daily       Review of patient's allergies indicates:   Allergen Reactions    Opioids - morphine analogues Itching    Avocado (laurus persea)         Current Inpatient Medications   enoxparin  40 mg Subcutaneous Q12H    insulin glargine U-100  20 Units  Subcutaneous QHS    lactated ringers  1,000 mL Intravenous Once    piperacillin-tazobactam (Zosyn) IV (PEDS and ADULTS) (extended infusion is not appropriate)  4.5 g Intravenous Q8H    sodium chloride 0.9%  30 mL/kg (Ideal) Intravenous Once       Current Intravenous Infusions   NORepinephrine bitartrate-D5W  0-3 mcg/kg/min Intravenous Continuous             Review of Systems   Constitutional:  Positive for fever and malaise/fatigue.   Eyes:  Negative for blurred vision and double vision.   Respiratory:  Positive for shortness of breath. Negative for cough and sputum production.    Cardiovascular:  Negative for chest pain, palpitations and leg swelling.   Gastrointestinal:  Negative for abdominal pain, constipation, diarrhea, nausea and vomiting.   Musculoskeletal:  Positive for back pain.   Neurological:  Negative for dizziness, speech change, weakness and headaches.          Objective:       Intake/Output Summary (Last 24 hours) at 6/7/2025 2002  Last data filed at 6/7/2025 1719  Gross per 24 hour   Intake 2000 ml   Output --   Net 2000 ml         Vital Signs (Most Recent):  Temp: 98.4 °F (36.9 °C) (06/07/25 1946)  Pulse: 79 (06/07/25 1946)  Resp: 20 (06/07/25 1946)  BP: (!) 106/57 (06/07/25 1946)  SpO2: 99 % (06/07/25 1946)  Body mass index is 42.57 kg/m².  Weight: 127 kg (280 lb) Vital Signs (24h Range):  Temp:  [98.1 °F (36.7 °C)-102.8 °F (39.3 °C)] 98.4 °F (36.9 °C)  Pulse:  [] 79  Resp:  [18-29] 20  SpO2:  [92 %-99 %] 99 %  BP: ()/(49-88) 106/57     Physical Exam  Constitutional:       Appearance: She is obese. She is ill-appearing.   HENT:      Head: Normocephalic.      Mouth/Throat:      Mouth: Mucous membranes are dry.      Pharynx: Oropharynx is clear.   Eyes:      General: No scleral icterus.     Extraocular Movements: Extraocular movements intact.      Conjunctiva/sclera: Conjunctivae normal.   Cardiovascular:      Rate and Rhythm: Normal rate and regular rhythm.      Pulses: Normal pulses.       Heart sounds: Normal heart sounds.      Comments: Positive passive leg raise test, patient is fluid responsive and likely volume down.  Pulmonary:      Effort: Pulmonary effort is normal. No respiratory distress.      Breath sounds: Normal breath sounds.   Chest:      Chest wall: No tenderness.   Abdominal:      General: Abdomen is flat. Bowel sounds are normal.      Palpations: Abdomen is soft.      Tenderness: There is no abdominal tenderness. There is no guarding or rebound.   Musculoskeletal:         General: No swelling. Normal range of motion.      Cervical back: Normal range of motion and neck supple.   Skin:     General: Skin is warm and dry.      Capillary Refill: Capillary refill takes less than 2 seconds.   Neurological:      General: No focal deficit present.      Mental Status: She is alert.      Sensory: No sensory deficit.      Motor: No weakness.      Comments: Alert and oriented x3 however lethargic and slow to respond.   Psychiatric:         Mood and Affect: Mood normal.         Behavior: Behavior normal.           Lines/Drains/Airways       Drain  Duration                  Urethral Catheter 06/07/25 1515 Temperature probe 16 Fr. <1 day              Peripheral Intravenous Line  Duration                  Peripheral IV - Single Lumen 06/07/25 1355 18 G Anterior;Right Forearm <1 day                    Significant Labs:    Lab Results   Component Value Date    WBC 15.10 (H) 06/07/2025    HGB 10.7 (L) 06/07/2025    HCT 34.7 (L) 06/07/2025    MCV 86.1 06/07/2025     06/07/2025           BMP  Lab Results   Component Value Date     06/07/2025    K 4.3 06/07/2025    CO2 15 (L) 06/07/2025    BUN 43.4 (H) 06/07/2025    CREATININE 2.00 (H) 06/07/2025    CALCIUM 9.1 06/07/2025    AGAP 21.0 06/07/2025    EGFRNONAA 47 01/28/2022         ABG  Recent Labs   Lab 06/07/25  1758   PH 7.430   PO2 196.0   PCO2 31.0   HCO3 20.6   POCBASEDEF -3.10       Mechanical Ventilation Support:         Significant  Imaging:  I have reviewed the pertinent imaging within the past 24 hours.        Assessment/Plan:     Assessment  Sepsis secondary to UTI  MYLA on CKD  Metabolic acidosis  Suspected PE   Type 2 diabetes      Plan  Admit to ICU   Continue on broad-spectrum antibiotics with Zosyn, can consider deescalation following results of urine culture   Replete fluids, positive passive leg raise test and elevated beta hydroxybutyrate with no ketones in the urine indicates patient is likely significantly volume down  Initiate Levophed if BP is unresponsive to volume resuscitation   Ordered echo, repeat BMP -> consider CTA if renal indices improved  Blood cultures x2 pending, urine culture pending.  Started on Lantus 20 units q.h.s. with low-dose sliding scale, patient insulin naive, continue to titrate as needed    DVT Prophylaxis:  Lovenox  GI Prophylaxis: -     32 minutes of critical care was time spent personally by me on the following activities: development of treatment plan with patient or surrogate and bedside caregivers, discussions with consultants, evaluation of patient's response to treatment, examination of patient, ordering and performing treatments and interventions, ordering and review of laboratory studies, ordering and review of radiographic studies, pulse oximetry, re-evaluation of patient's condition.  This critical care time did not overlap with that of any other provider or involve time for any procedures.     Marcos Serrano DO  Pulmonary Critical Care Medicine  [facility] - Emergency Dept  DOS: 06/07/2025          [1]   Social History  Socioeconomic History    Marital status:    Tobacco Use    Smoking status: Never    Smokeless tobacco: Never   Substance and Sexual Activity    Alcohol use: Not Currently     Social Drivers of Health     Financial Resource Strain: Low Risk  (3/26/2025)    Overall Financial Resource Strain (CARDIA)     Difficulty of Paying Living Expenses: Not very hard    Food Insecurity: No Food Insecurity (3/26/2025)    Hunger Vital Sign     Worried About Running Out of Food in the Last Year: Never true     Ran Out of Food in the Last Year: Never true   Transportation Needs: No Transportation Needs (3/25/2025)    PRAPARE - Transportation     Lack of Transportation (Medical): No     Lack of Transportation (Non-Medical): No   Physical Activity: Inactive (3/25/2025)    Exercise Vital Sign     Days of Exercise per Week: 0 days     Minutes of Exercise per Session: 0 min   Stress: No Stress Concern Present (3/25/2025)    [employer] - Occupational Stress Questionnaire     Feeling of Stress : Only a little   Housing Stability: Low Risk  (3/26/2025)    Housing Stability Vital Sign     Unable to Pay for Housing in the Last Year: No     Homeless in the Last Year: No

## 2025-06-09 LAB
ALBUMIN SERPL-MCNC: 1.9 G/DL (ref 3.4–4.8)
ALBUMIN/GLOB SERPL: 0.5 RATIO (ref 1.1–2)
ALP SERPL-CCNC: 65 UNIT/L (ref 40–150)
ALT SERPL-CCNC: 12 UNIT/L (ref 0–55)
ANION GAP SERPL CALC-SCNC: 11 MEQ/L
ANION GAP SERPL CALC-SCNC: 13 MEQ/L
ANION GAP SERPL CALC-SCNC: 9 MEQ/L
AST SERPL-CCNC: 21 UNIT/L (ref 11–45)
B-OH-BUTYR SERPL-MCNC: 0.3 MMOL/L
B-OH-BUTYR SERPL-MCNC: 0.5 MMOL/L
B-OH-BUTYR SERPL-MCNC: 1 MMOL/L
BACTERIA UR CULT: ABNORMAL
BASOPHILS # BLD AUTO: 0.03 X10(3)/MCL
BASOPHILS NFR BLD AUTO: 0.3 %
BILIRUB SERPL-MCNC: 0.6 MG/DL
BUN SERPL-MCNC: 46.2 MG/DL (ref 9.8–20.1)
BUN SERPL-MCNC: 49.1 MG/DL (ref 9.8–20.1)
BUN SERPL-MCNC: 49.4 MG/DL (ref 9.8–20.1)
CALCIUM SERPL-MCNC: 8.4 MG/DL (ref 8.4–10.2)
CALCIUM SERPL-MCNC: 8.4 MG/DL (ref 8.4–10.2)
CALCIUM SERPL-MCNC: 8.5 MG/DL (ref 8.4–10.2)
CHLORIDE SERPL-SCNC: 106 MMOL/L (ref 98–107)
CHLORIDE SERPL-SCNC: 107 MMOL/L (ref 98–107)
CHLORIDE SERPL-SCNC: 107 MMOL/L (ref 98–107)
CO2 SERPL-SCNC: 19 MMOL/L (ref 23–31)
CO2 SERPL-SCNC: 21 MMOL/L (ref 23–31)
CO2 SERPL-SCNC: 21 MMOL/L (ref 23–31)
CREAT SERPL-MCNC: 1.65 MG/DL (ref 0.55–1.02)
CREAT SERPL-MCNC: 1.74 MG/DL (ref 0.55–1.02)
CREAT SERPL-MCNC: 1.8 MG/DL (ref 0.55–1.02)
CREAT/UREA NIT SERPL: 27
CREAT/UREA NIT SERPL: 27
CREAT/UREA NIT SERPL: 30
EOSINOPHIL # BLD AUTO: 0.07 X10(3)/MCL (ref 0–0.9)
EOSINOPHIL NFR BLD AUTO: 0.8 %
ERYTHROCYTE [DISTWIDTH] IN BLOOD BY AUTOMATED COUNT: 16.7 % (ref 11.5–17)
FLUAV AG UPPER RESP QL IA.RAPID: NOT DETECTED
FLUBV AG UPPER RESP QL IA.RAPID: NOT DETECTED
GFR SERPLBLD CREATININE-BSD FMLA CKD-EPI: 30 ML/MIN/1.73/M2
GFR SERPLBLD CREATININE-BSD FMLA CKD-EPI: 31 ML/MIN/1.73/M2
GFR SERPLBLD CREATININE-BSD FMLA CKD-EPI: 33 ML/MIN/1.73/M2
GLOBULIN SER-MCNC: 3.6 GM/DL (ref 2.4–3.5)
GLUCOSE SERPL-MCNC: 120 MG/DL (ref 82–115)
GLUCOSE SERPL-MCNC: 215 MG/DL (ref 82–115)
GLUCOSE SERPL-MCNC: 92 MG/DL (ref 82–115)
HCT VFR BLD AUTO: 27.6 % (ref 37–47)
HGB BLD-MCNC: 8.4 G/DL (ref 12–16)
IMM GRANULOCYTES # BLD AUTO: 0.07 X10(3)/MCL (ref 0–0.04)
IMM GRANULOCYTES NFR BLD AUTO: 0.8 %
LYMPHOCYTES # BLD AUTO: 0.63 X10(3)/MCL (ref 0.6–4.6)
LYMPHOCYTES NFR BLD AUTO: 7.1 %
MAGNESIUM SERPL-MCNC: 1.8 MG/DL (ref 1.6–2.6)
MAGNESIUM SERPL-MCNC: 1.8 MG/DL (ref 1.6–2.6)
MAGNESIUM SERPL-MCNC: 1.9 MG/DL (ref 1.6–2.6)
MCH RBC QN AUTO: 26.3 PG (ref 27–31)
MCHC RBC AUTO-ENTMCNC: 30.4 G/DL (ref 33–36)
MCV RBC AUTO: 86.3 FL (ref 80–94)
MONOCYTES # BLD AUTO: 0.42 X10(3)/MCL (ref 0.1–1.3)
MONOCYTES NFR BLD AUTO: 4.7 %
NEUTROPHILS # BLD AUTO: 7.69 X10(3)/MCL (ref 2.1–9.2)
NEUTROPHILS NFR BLD AUTO: 86.3 %
NRBC BLD AUTO-RTO: 0 %
PHOSPHATE SERPL-MCNC: 2.8 MG/DL (ref 2.3–4.7)
PHOSPHATE SERPL-MCNC: 2.9 MG/DL (ref 2.3–4.7)
PHOSPHATE SERPL-MCNC: 3 MG/DL (ref 2.3–4.7)
PLATELET # BLD AUTO: 141 X10(3)/MCL (ref 130–400)
PMV BLD AUTO: 12.1 FL (ref 7.4–10.4)
POCT GLUCOSE: 100 MG/DL (ref 70–110)
POCT GLUCOSE: 102 MG/DL (ref 70–110)
POCT GLUCOSE: 119 MG/DL (ref 70–110)
POCT GLUCOSE: 164 MG/DL (ref 70–110)
POCT GLUCOSE: 168 MG/DL (ref 70–110)
POCT GLUCOSE: 374 MG/DL (ref 70–110)
POCT GLUCOSE: 424 MG/DL (ref 70–110)
POCT GLUCOSE: 94 MG/DL (ref 70–110)
POTASSIUM SERPL-SCNC: 3.3 MMOL/L (ref 3.5–5.1)
POTASSIUM SERPL-SCNC: 3.3 MMOL/L (ref 3.5–5.1)
POTASSIUM SERPL-SCNC: 3.5 MMOL/L (ref 3.5–5.1)
PROT SERPL-MCNC: 5.5 GM/DL (ref 5.8–7.6)
RBC # BLD AUTO: 3.2 X10(6)/MCL (ref 4.2–5.4)
RSV A 5' UTR RNA NPH QL NAA+PROBE: NOT DETECTED
SARS-COV-2 RNA RESP QL NAA+PROBE: NOT DETECTED
SODIUM SERPL-SCNC: 136 MMOL/L (ref 136–145)
SODIUM SERPL-SCNC: 139 MMOL/L (ref 136–145)
SODIUM SERPL-SCNC: 139 MMOL/L (ref 136–145)
WBC # BLD AUTO: 8.91 X10(3)/MCL (ref 4.5–11.5)

## 2025-06-09 PROCEDURE — 63600175 PHARM REV CODE 636 W HCPCS: Performed by: INTERNAL MEDICINE

## 2025-06-09 PROCEDURE — 63600175 PHARM REV CODE 636 W HCPCS

## 2025-06-09 PROCEDURE — 11000001 HC ACUTE MED/SURG PRIVATE ROOM

## 2025-06-09 PROCEDURE — 25000003 PHARM REV CODE 250

## 2025-06-09 PROCEDURE — 82010 KETONE BODYS QUAN: CPT | Performed by: INTERNAL MEDICINE

## 2025-06-09 PROCEDURE — 97166 OT EVAL MOD COMPLEX 45 MIN: CPT

## 2025-06-09 PROCEDURE — 0241U COVID/RSV/FLU A&B PCR: CPT | Performed by: EMERGENCY MEDICINE

## 2025-06-09 PROCEDURE — 36415 COLL VENOUS BLD VENIPUNCTURE: CPT

## 2025-06-09 PROCEDURE — 83735 ASSAY OF MAGNESIUM: CPT | Performed by: INTERNAL MEDICINE

## 2025-06-09 PROCEDURE — 36415 COLL VENOUS BLD VENIPUNCTURE: CPT | Performed by: INTERNAL MEDICINE

## 2025-06-09 PROCEDURE — 80048 BASIC METABOLIC PNL TOTAL CA: CPT | Performed by: INTERNAL MEDICINE

## 2025-06-09 PROCEDURE — 21400001 HC TELEMETRY ROOM

## 2025-06-09 PROCEDURE — 25000003 PHARM REV CODE 250: Performed by: HOSPITALIST

## 2025-06-09 PROCEDURE — 84100 ASSAY OF PHOSPHORUS: CPT | Performed by: INTERNAL MEDICINE

## 2025-06-09 PROCEDURE — 63600175 PHARM REV CODE 636 W HCPCS: Performed by: HOSPITALIST

## 2025-06-09 PROCEDURE — 87040 BLOOD CULTURE FOR BACTERIA: CPT | Performed by: HOSPITALIST

## 2025-06-09 PROCEDURE — 80053 COMPREHEN METABOLIC PANEL: CPT

## 2025-06-09 PROCEDURE — 97162 PT EVAL MOD COMPLEX 30 MIN: CPT

## 2025-06-09 PROCEDURE — 85025 COMPLETE CBC W/AUTO DIFF WBC: CPT

## 2025-06-09 RX ORDER — IBUPROFEN 200 MG
16 TABLET ORAL
Status: DISCONTINUED | OUTPATIENT
Start: 2025-06-09 | End: 2025-06-10

## 2025-06-09 RX ORDER — ELECTROLYTES/DEXTROSE
400 SOLUTION, ORAL ORAL
Status: DISCONTINUED | OUTPATIENT
Start: 2025-06-09 | End: 2025-06-12 | Stop reason: HOSPADM

## 2025-06-09 RX ORDER — LANOLIN ALCOHOL/MO/W.PET/CERES
1 CREAM (GRAM) TOPICAL DAILY
Status: DISCONTINUED | OUTPATIENT
Start: 2025-06-10 | End: 2025-06-12 | Stop reason: HOSPADM

## 2025-06-09 RX ORDER — INSULIN ASPART 100 [IU]/ML
0-10 INJECTION, SOLUTION INTRAVENOUS; SUBCUTANEOUS
Status: DISCONTINUED | OUTPATIENT
Start: 2025-06-09 | End: 2025-06-09

## 2025-06-09 RX ORDER — ARIPIPRAZOLE 5 MG/1
10 TABLET ORAL DAILY
Status: DISCONTINUED | OUTPATIENT
Start: 2025-06-10 | End: 2025-06-12 | Stop reason: HOSPADM

## 2025-06-09 RX ORDER — INSULIN ASPART 100 [IU]/ML
0-5 INJECTION, SOLUTION INTRAVENOUS; SUBCUTANEOUS
Status: DISCONTINUED | OUTPATIENT
Start: 2025-06-09 | End: 2025-06-10

## 2025-06-09 RX ORDER — GLUCAGON 1 MG
1 KIT INJECTION
Status: DISCONTINUED | OUTPATIENT
Start: 2025-06-09 | End: 2025-06-10

## 2025-06-09 RX ORDER — IBUPROFEN 200 MG
16 TABLET ORAL
Status: DISCONTINUED | OUTPATIENT
Start: 2025-06-09 | End: 2025-06-09

## 2025-06-09 RX ORDER — IBUPROFEN 200 MG
24 TABLET ORAL
Status: DISCONTINUED | OUTPATIENT
Start: 2025-06-09 | End: 2025-06-10

## 2025-06-09 RX ORDER — MICONAZOLE NITRATE 2 G/100G
POWDER TOPICAL 2 TIMES DAILY
Status: DISCONTINUED | OUTPATIENT
Start: 2025-06-09 | End: 2025-06-12 | Stop reason: HOSPADM

## 2025-06-09 RX ORDER — CEFTRIAXONE 2 G/1
2 INJECTION, POWDER, FOR SOLUTION INTRAMUSCULAR; INTRAVENOUS
Status: DISCONTINUED | OUTPATIENT
Start: 2025-06-09 | End: 2025-06-11

## 2025-06-09 RX ORDER — INSULIN GLARGINE 100 [IU]/ML
10 INJECTION, SOLUTION SUBCUTANEOUS NIGHTLY
Status: DISCONTINUED | OUTPATIENT
Start: 2025-06-09 | End: 2025-06-10

## 2025-06-09 RX ORDER — GLUCAGON 1 MG
1 KIT INJECTION
Status: DISCONTINUED | OUTPATIENT
Start: 2025-06-09 | End: 2025-06-09

## 2025-06-09 RX ORDER — ALLOPURINOL 100 MG/1
100 TABLET ORAL DAILY
Status: DISCONTINUED | OUTPATIENT
Start: 2025-06-10 | End: 2025-06-12 | Stop reason: HOSPADM

## 2025-06-09 RX ORDER — ASPIRIN 81 MG/1
81 TABLET ORAL DAILY
Status: DISCONTINUED | OUTPATIENT
Start: 2025-06-10 | End: 2025-06-12 | Stop reason: HOSPADM

## 2025-06-09 RX ORDER — IBUPROFEN 200 MG
24 TABLET ORAL
Status: DISCONTINUED | OUTPATIENT
Start: 2025-06-09 | End: 2025-06-09

## 2025-06-09 RX ORDER — POTASSIUM CHLORIDE 7.45 MG/ML
10 INJECTION INTRAVENOUS
Status: COMPLETED | OUTPATIENT
Start: 2025-06-09 | End: 2025-06-09

## 2025-06-09 RX ORDER — SODIUM CHLORIDE 9 MG/ML
1000 INJECTION, SOLUTION INTRAVENOUS CONTINUOUS
Status: DISCONTINUED | OUTPATIENT
Start: 2025-06-09 | End: 2025-06-10

## 2025-06-09 RX ORDER — SERTRALINE HYDROCHLORIDE 50 MG/1
100 TABLET, FILM COATED ORAL DAILY
Status: DISCONTINUED | OUTPATIENT
Start: 2025-06-10 | End: 2025-06-12 | Stop reason: HOSPADM

## 2025-06-09 RX ADMIN — MUPIROCIN: 20 OINTMENT TOPICAL at 08:06

## 2025-06-09 RX ADMIN — ENOXAPARIN SODIUM 40 MG: 100 INJECTION SUBCUTANEOUS at 08:06

## 2025-06-09 RX ADMIN — SODIUM CHLORIDE 1000 ML: 9 INJECTION, SOLUTION INTRAVENOUS at 11:06

## 2025-06-09 RX ADMIN — MUPIROCIN: 20 OINTMENT TOPICAL at 09:06

## 2025-06-09 RX ADMIN — ENOXAPARIN SODIUM 40 MG: 100 INJECTION SUBCUTANEOUS at 09:06

## 2025-06-09 RX ADMIN — CEFTRIAXONE SODIUM 2 G: 2 INJECTION, POWDER, FOR SOLUTION INTRAMUSCULAR; INTRAVENOUS at 02:06

## 2025-06-09 RX ADMIN — POTASSIUM CHLORIDE 10 MEQ: 7.46 INJECTION, SOLUTION INTRAVENOUS at 12:06

## 2025-06-09 RX ADMIN — INSULIN GLARGINE 10 UNITS: 100 INJECTION, SOLUTION SUBCUTANEOUS at 08:06

## 2025-06-09 RX ADMIN — POTASSIUM CHLORIDE 10 MEQ: 7.46 INJECTION, SOLUTION INTRAVENOUS at 11:06

## 2025-06-09 RX ADMIN — Medication 400 ML: at 11:06

## 2025-06-09 RX ADMIN — PIPERACILLIN SODIUM AND TAZOBACTAM SODIUM 4.5 G: 4; .5 INJECTION, POWDER, LYOPHILIZED, FOR SOLUTION INTRAVENOUS at 04:06

## 2025-06-09 RX ADMIN — Medication: at 08:06

## 2025-06-09 RX ADMIN — MICONAZOLE NITRATE: 20 POWDER TOPICAL at 08:06

## 2025-06-09 RX ADMIN — SODIUM CHLORIDE 1000 ML: 9 INJECTION, SOLUTION INTRAVENOUS at 03:06

## 2025-06-09 RX ADMIN — POTASSIUM CHLORIDE 10 MEQ: 7.46 INJECTION, SOLUTION INTRAVENOUS at 10:06

## 2025-06-09 RX ADMIN — INSULIN ASPART 5 UNITS: 100 INJECTION, SOLUTION INTRAVENOUS; SUBCUTANEOUS at 08:06

## 2025-06-09 RX ADMIN — POTASSIUM CHLORIDE 10 MEQ: 7.46 INJECTION, SOLUTION INTRAVENOUS at 09:06

## 2025-06-09 NOTE — PROGRESS NOTES
Pulmonary & Critical Care Medicine   Progress Note      Presenting History/HPI:  Patient is an 79-year-old female with past medical history of type 2 diabetes, CKD, and depression who presents to [facility] with altered mental status following a ground level fall x2.  Patient reports she fell last night and again at 3:00 a.m. this morning when leaving her restroom.  She reports difficulty remembering exactly what took place however she denies syncope, loss of consciousness, or head trauma.  Most reported patient has smelled strongly of urine and was warm to the touch upon their arrival.  EMS administered fluids and CBG on-site was 505.  Patient reports a similar fall one-week prior that was evaluated at [place] ED, found to have no acute bleed.       Interval History:  Patient overnight no acute issues, off pressors much improved blood glucose levels      Scheduled Medications:    cefTRIAXone (Rocephin) IV (PEDS and ADULTS)  2 g Intravenous Q24H    enoxparin  40 mg Subcutaneous Q12H    mupirocin   Nasal BID    potassium chloride  10 mEq Intravenous Q1H    sodium chloride 0.9%  30 mL/kg (Ideal) Intravenous Once       PRN Medications:     Current Facility-Administered Medications:     acetaminophen, 650 mg, Oral, Q6H PRN    D5 and 0.45% NaCl, , Intravenous, Continuous PRN    dextrose 50%, 12.5 g, Intravenous, PRN    dextrose 50%, 25 g, Intravenous, PRN    glucagon (human recombinant), 1 mg, Intramuscular, PRN    glucose, 16 g, Oral, PRN    glucose, 24 g, Oral, PRN    insulin aspart U-100, 0-10 Units, Subcutaneous, QID (AC + HS) PRN    naloxone, 0.02 mg, Intravenous, PRN    sodium chloride 0.9%, 10 mL, Intravenous, Q12H PRN    sodium chloride 0.9%, 10 mL, Intravenous, PRN      Infusions:     0.9% NaCl  1,000 mL Intravenous Continuous        D5 and 0.45% NaCl   Intravenous Continuous PRN   Stopped at 06/09/25 0114    insulin regular 1 units/mL infusion orderable (DKA)  0-0.2 Units/kg/hr Intravenous Continuous   Stopped  "at 06/09/25 0114    NORepinephrine bitartrate-D5W  0-3 mcg/kg/min Intravenous Continuous             Fluid Balance:     Intake/Output Summary (Last 24 hours) at 6/9/2025 1014  Last data filed at 6/9/2025 0900  Gross per 24 hour   Intake 2470.81 ml   Output 1240 ml   Net 1230.81 ml         Vital Signs:   Vitals:    06/09/25 1000   BP: (!) 124/51   Pulse: 84   Resp: (!) 26   Temp:          Physical Exam    -Constitutional:       Appearance: NAD  HENT:      Head: Normocephalic.      Mouth/Throat:      Mouth: Mucous membranes are dry.      Pharynx: Oropharynx is clear.   Eyes:      General: No scleral icterus.     Extraocular Movements: Extraocular movements intact.      Conjunctiva/sclera: Conjunctivae normal.   Cardiovascular:      Rate and Rhythm: Normal rate and regular rhythm.      Pulses: Normal pulses.      Heart sounds: Normal heart sounds.   Pulmonary:      Effort: Pulmonary effort is normal. No respiratory distress.      Breath sounds: Normal breath sounds.   Chest:      Chest wall: No tenderness.   Abdominal:      General: Abdomen is flat. Bowel sounds are normal.      Palpations: Abdomen is soft.      Tenderness: There is no abdominal tenderness. There is no guarding or rebound.   Musculoskeletal:         General: No swelling. Normal range of motion.      Cervical back: Normal range of motion and neck supple.   Skin:     General: Skin is warm and dry.      Capillary Refill: Capillary refill takes less than 2 seconds.   Neurological:      General: No focal deficit present.      Mental Status: She is alert.      Sensory: No sensory deficit.      Motor: No weakness.      Comments: Alert and oriented x3   Psychiatric:         Mood and Affect: Mood normal.         Behavior: Behavior normal.          Ventilator Settings         Laboratory Studies:   No results for input(s): "PH", "PCO2", "PO2", "HCO3", "POCSATURATED", "BE" in the last 24 hours.  Recent Labs   Lab 06/09/25  0434   WBC 8.91   RBC 3.20*   HGB 8.4* "   HCT 27.6*      MCV 86.3   MCH 26.3*   MCHC 30.4*     Recent Labs   Lab 06/09/25  0819      K 3.3*      CO2 21*   BUN 49.4*   CREATININE 1.65*   CALCIUM 8.4   MG 1.80         Microbiology Data:   Microbiology Results (last 7 days)       Procedure Component Value Units Date/Time    Blood culture x two cultures. Draw prior to antibiotics. [[id number]]  (Abnormal) Collected: 06/07/25 1452    Order Status: Completed Specimen: Blood from Forearm, Left Updated: 06/09/25 0750     Blood Culture Gram-negative Rods     GRAM STAIN Gram Negative Rods      Seen in gram stain of broth only      1 of 2 Anaerobic bottles positive    Blood culture x two cultures. Draw prior to antibiotics. [[id number]]  (Abnormal) Collected: 06/07/25 1446    Order Status: Completed Specimen: Blood, Venous Updated: 06/09/25 0750     Blood Culture Gram-negative Rods     GRAM STAIN Gram Negative Rods      Seen in gram stain of broth only      2 of 2 bottles positive    Urine culture [[id number]]  (Abnormal)  (Susceptibility) Collected: 06/07/25 1717    Order Status: Completed Specimen: Urine Updated: 06/09/25 0646     Urine Culture >/= 100,000 colonies/ml Klebsiella pneumoniae ssp pneumoniae    BCID2 Panel [[id number]]  (Abnormal) Collected: 06/07/25 1446    Order Status: Completed Specimen: Blood, Venous Updated: 06/08/25 0912     CTX-M (ESBL ) Not Detected     IMP (Cabapenemase ) Not Detected     KPC resistance gene (Carbapenemase ) Not Detected     mcr-1 Not Detected     mecA ID N/A     Comment: Note: Antimicrobial resistance can occur via multiple mechanisms. A Not Detected result for antimicrobial resistance gene(s) does not indicate antimicrobial susceptibility. Subculturing is required for species identification and susceptibility testing of   isolates.        mecA/C and MREJ (MRSA) gene N/A     NDM (Carbapenemase ) Not Detected     OXA-48-like (Carbapenemase ) Not Detected     [place]/B  (VRE gene) N/A     VIM (Carbapenemase ) Not Detected     Enterococcus faecalis Not Detected     Enterococcus faecium Not Detected     Listeria monocytogenes Not Detected     Staphylococcus spp. Not Detected     Staphylococcus aureus Not Detected     Staphylococcus epidermidis Not Detected     Staphylococcus lugdunensis Not Detected     Streptococcus spp. Not Detected     Streptococcus agalactiae (Group B) Not Detected     Streptococcus pneumoniae Not Detected     Streptococcus pyogenes (Group A) Not Detected     Acinetobacter calcoaceticus/baumannii complex Not Detected     Bacteroides fragilis Not Detected     Enterobacterales See Species for ID     Enterobacter cloacae complex Not Detected     Escherichia coli Not Detected     Klebsiella aerogenes Not Detected     Klebsiella oxytoca Not Detected     Klebsiella pneumoniae group Detected     Proteus spp. Not Detected     Salmonella spp. Not Detected     Serratia marcescens Not Detected     Haemophilus influenzae Not Detected     Neisseria meningitidis Not Detected     Pseudomonas aeruginosa Not Detected     Stenotrophomonas maltophilia Not Detected     Candida albicans Not Detected     Candida auris Not Detected     Candida glabrata Not Detected     Candida krusei Not Detected     Candida parapsilosis Not Detected     Candida tropicalis Not Detected     Cryptococcus neoformans/gattii Not Detected    Narrative:      The [employer] BCID2 Panel is a multiplexed nucleic acid test intended for the use with [employer]® 2.0 or [employer]® Bold[employer] Systems for the simultaneous qualitative detection and identification of multiple bacterial and yeast nucleic acids and select genetic determinants associated with antimicrobial resistance.  The [employer] BCID2 Panel test is performed directly on blood culture samples identified as positive by a continuous monitoring blood culture system.  Results are intended to be interpreted in conjunction with Gram stain  results.              Imaging:   CV Ultrasound doppler venous legs bilat  Negative for deep and superficial vein thrombosis in bilateral lower   extremities.           Assessment and Plan    Assessment:  Sepsis secondary to UTI  MYLA on CKD  Metabolic acidosis  Suspected PE   Type 2 diabetes             Plan:  - Neurologically intact and baseline  -on nasal cannula good oxygenation  -Hemodynamically stable, improved perfusion parameters, echo with EF of 45-50%, with a dilated RV and a positive Stacy sign, likely secondary to RV overload, negative V/Q scan  -Hyperglycemia  Lantus and sliding scale insulin  -Sepsis secondary to urinary tract infection positive for Klebsiella and Enterobacter rales on Zosyn change to ceftriaxone  -CKD/MYLA fluid resuscitation improving, renal ultrasound with complex appearing cystic structure, would need characterization CTA abdomen with and without contrast renal mass protocol  -monitor and replace electrolytes       -okay to step-down from ICU      DVT ppx/tx with lovenox  GI ppx with protonix  Keep HOB elevated > 30*              Anibal Esqueda MD  6/9/2025  Pulmonology/Critical Care

## 2025-06-09 NOTE — PLAN OF CARE
06/09/25 0905   Discharge Assessment   Assessment Type Discharge Planning Assessment   Confirmed/corrected address, phone number and insurance Yes   Confirmed Demographics Correct on Facesheet   Source of Information patient   Communicated SERGIO with patient/caregiver Date not available/Unable to determine   People in Home grandchild(reece);child(reece), adult   Name(s) of People in Home Rashmi li her  and her children   Facility Arrived From: none   Do you expect to return to your current living situation? Yes   Do you have help at home or someone to help you manage your care at home? Yes   Who are your caregiver(s) and their phone number(s)? Rashmi li   Prior to hospitilization cognitive status: Alert/Oriented   Current cognitive status: Alert/Oriented   Walking or Climbing Stairs Difficulty no   Dressing/Bathing Difficulty yes   Dressing/Bathing bathing difficulty, requires equipment   Dressing/Bathing Management shower chair   Home Accessibility stairs to enter home   Number of Stairs, Main Entrance five   Stair Railings, Main Entrance railings safe and in good condition   Equipment Currently Used at Home shower chair   Patient currently being followed by outpatient case management? No   Do you currently have service(s) that help you manage your care at home? No   Do you take prescription medications? Yes   Do you have prescription coverage? Yes   Do you have any problems affording any of your prescribed medications? No   Who is going to help you get home at discharge? family   How do you get to doctors appointments? family or friend will provide   Are you on dialysis? No   Do you take coumadin? No   Discharge Plan A Home;Home with family   Discharge Plan B Home with family;Home Health   DME Needed Upon Discharge  none   Discharge Plan discussed with: Patient   Transition of Care Barriers None     Patient lives with her daughter, daughter's  and children  She has shower chair but no  other DME  No HH.  Plan is to return with daughter.

## 2025-06-09 NOTE — PLAN OF CARE
Problem: Physical Therapy  Goal: Physical Therapy Goal  Description: Goals to be met by: 25     Patient will increase functional independence with mobility by performin. Supine to sit with [place]  2. Sit to stand transfer with Modified [place]  3. Bed to chair transfer with Modified [place] using Rolling Walker  4. Gait TBD pending further assessment    Outcome: Progressing

## 2025-06-09 NOTE — PT/OT/SLP EVAL
Physical Therapy Evaluation    Patient Name:  Vi Sultana   MRN:  [id number]    Recommendations:     Discharge therapy intensity:  (TBD pending further assessment)   Discharge Equipment Recommendations:  (TBD)   Barriers to discharge: Impaired mobility and Ongoing medical needs    Assessment:     Vi Sultana is a 69 y.o. female admitted with a medical diagnosis of sepsis 2/2 UTI, MYLA on CKD, metabolic acidosis, AMS, fall at home.  She presents with the following impairments/functional limitations: weakness, impaired endurance, impaired functional mobility, impaired self care skills, impaired skin . Evaluation limited by frequent loose BM, required Lyndsay for bed mobility and totalA for pericare. Per pt report she was independent prior to admission; will update discharge/DME recs pending further assessment.    Rehab Prognosis: Good; patient would benefit from acute skilled PT services to address these deficits and reach maximum level of function.    Recent Surgery: * No surgery found *      Plan:     During this hospitalization, patient would benefit from acute PT services 5 x/week to address the identified rehab impairments via gait training, therapeutic activities, therapeutic exercises, neuromuscular re-education and progress toward the following goals:    Plan of Care Expires:  07/09/25    Subjective     Chief Complaint: none  Patient/Family Comments/goals: PLOF  Pain/Comfort:  Pain Rating 1: 0/10    Patients cultural, spiritual, Anabaptist conflicts given the current situation: no    Living Environment:  Pt lives with her daughter and her family in a SLH with 5 JEN with BHR. Walk in shower with a seat.  Prior to admission, patients level of function was independent.  Equipment used at home: shower chair.  DME owned (not currently used): none.  Upon discharge, patient will have assistance from daughter.    Objective:     Communicated with RN prior to session.  Patient found HOB elevated with telemetry, pulse  ox (continuous), peripheral IV, hernandez catheter, blood pressure cuff  upon PT entry to room.    General Precautions: Standard, fall  Orthopedic Precautions:    Braces:    Respiratory Status: Nasal cannula, flow 2 L/min  Blood Pressure: 126/62 HR 90      Exams:  Cognitive Exam:  Patient is oriented to Person, Place, Time, and Situation  RLE Strength: MITZI against gravity, moves legs equally along bed in gravity eliminated position  LLE Strength: see above  Skin integrity: perineal area red      Functional Mobility:  Bed Mobility:     Rolling Left:  minimum assistance  Rolling Right: minimum assistance      AM-PAC 6 CLICK MOBILITY  Total Score:8     Co-Treatment: Yes, due to Limited activity tolerance    Treatment & Education:  Pt found to be soiled with loose BM on arrival, Lyndsay for rolling and total A for pericare. Pt reports she has not had awareness of the need to void or that she has voided since admission. RN called into room due to heavily soiled catheter. While rolling pt reported need to void again, no bedpan available on unit so positioned in sidelying with fadi pads, left under nsg supervision. Will return at a later time to complete PT eval.    Patient provided with verbal education education regarding PT role/goals/POC, fall prevention, safety awareness, and discharge/DME recommendations.  Understanding was verbalized.     Patient left HOB elevated with all lines intact, call button in reach, and nurses present.    GOALS:   Multidisciplinary Problems       Physical Therapy Goals          Problem: Physical Therapy    Goal Priority Disciplines Outcome Interventions   Physical Therapy Goal     PT, PT/OT Progressing    Description: Goals to be met by: 25     Patient will increase functional independence with mobility by performin. Supine to sit with [place]  2. Sit to stand transfer with Modified [place]  3. Bed to chair transfer with Modified [place] using Rolling Walker  4. Gait TBD  pending further assessment                         History:     Past Medical History:   Diagnosis Date    Diabetes mellitus     Digestive disorder     Renal disorder        Past Surgical History:   Procedure Laterality Date    COLONOSCOPY N/A 2/7/2023    Procedure: COLON;  Surgeon: Simin Cruz MD;  Location: [facility] ENDOSCOPY;  Service: Gastroenterology;  Laterality: N/A;    COLONOSCOPY  2/7/2023    Procedure: COLONOSCOPY, WITH HEMORRHAGE CONTROL;  Surgeon: Simin Cruz MD;  Location: [facility] ENDOSCOPY;  Service: Gastroenterology;;    COLONOSCOPY N/A 8/21/2023    Procedure: 1000 COLON;  Surgeon: Simni Cruz MD;  Location: [facility] ENDOSCOPY;  Service: Gastroenterology;  Laterality: N/A;    COLONOSCOPY  8/21/2023    Procedure: COLONOSCOPY, WITH HEMORRHAGE CONTROL;  Surgeon: Simin Cruz MD;  Location: [facility] ENDOSCOPY;  Service: Gastroenterology;;  epi 1 cc    COLONOSCOPY, WITH 1 OR MORE BIOPSIES  2/7/2023    Procedure: COLONOSCOPY, WITH 1 OR MORE BIOPSIES;  Surgeon: Simin Cruz MD;  Location: [facility] ENDOSCOPY;  Service: Gastroenterology;;    COLONOSCOPY, WITH POLYPECTOMY USING SNARE  2/7/2023    Procedure: COLONOSCOPY, WITH POLYPECTOMY USING SNARE;  Surgeon: Simin Cruz MD;  Location: [facility] ENDOSCOPY;  Service: Gastroenterology;;    COLONOSCOPY, WITH POLYPECTOMY USING SNARE  8/21/2023    Procedure: COLONOSCOPY, WITH POLYPECTOMY USING SNARE;  Surgeon: Simin Cruz MD;  Location: [facility] ENDOSCOPY;  Service: Gastroenterology;;       Time Tracking:     PT Received On: 06/09/25  PT Start Time: 1106     PT Stop Time: 1126  PT Total Time (min): 20 min     Billable Minutes: Evaluation MOD      06/09/2025

## 2025-06-09 NOTE — PT/OT/SLP EVAL
Occupational Therapy  Evaluation    Name: Vi Sultana  MRN: [id number]  Recent Surgery: * No surgery found *      Recommendations:     Discharge therapy intensity:  (pending progress)   Discharge Equipment Recommendations:   (TBD)  Barriers to discharge:  Other (Comment) (ongoing medical needs)    Assessment:     Vi Sultana is a 69 y.o. female with a medical diagnosis of sepsis 2/2 UTI, MYLA on CKD, metabolic acidosis, AMS, fall at home. She is A&Ox4 and tolerated OT evaluation fairly; limited this date by continuous loose BM with mobility. She presents with the following performance deficits affecting function: weakness, impaired endurance, impaired self care skills, impaired functional mobility, gait instability, impaired balance, decreased safety awareness, decreased lower extremity function, decreased upper extremity function, impaired skin. She required min A for bed mobility; will continue to progress as able. Will update d/c recs and POC accordingly.     Rehab Prognosis: Good; patient would benefit from acute skilled OT services to address these deficits and reach maximum level of function.       Plan:     Patient to be seen 5 x/week to address the above listed problems via self-care/home management, therapeutic activities, therapeutic exercises  Plan of Care Expires: 07/09/25  Plan of Care Reviewed with: patient    Subjective     Chief Complaint: needing to use the bathroom   Patient/Family Comments/goals: to go home     Occupational Profile:  Living Environment: Pt lives with her daughter in a single level home with x5 steps to enter and B HR. Pt has a walk-in shower with a SC.   Previous level of function: Pt reports being independent with ADLs prior.   Roles and Routines: mother  Equipment Used at Home: shower chair  Assistance upon Discharge: Pt will have assist from her daughter upon d/c.     Pain/Comfort:  Pain Rating 1: 0/10    Patients cultural, spiritual, Jew conflicts given the current  situation: no    Objective:     OT communicated with RN prior to session.      Patient was found HOB elevated with telemetry, pulse ox (continuous), peripheral IV, hernandze catheter, blood pressure cuff upon OT entry to room.    General Precautions: Standard, fall  Orthopedic Precautions: N/A  Braces: N/A    Vital Signs: Blood Pressure: 126/62  HR: 91  Sp02: 95%  Respiratory Status: on room air      Functional Mobility/Transfers:  Bed mobility:    Rolling Left:  minimum assistance  Rolling Right: minimum assistance  Functional Mobility: will continue to progress as able.     Activities of Daily Living:  Toileting: maximal assistance for perineal hygiene.     AMPAC 6 Click ADL:  AMPAC Total Score: 14    Functional Cognition:  Orientation: oriented to Person, Place, Time, and Situation    Visual Perceptual Skills:  Intact    Upper Extremity Function:  Right Upper Extremity:   WFL    Left Upper Extremity:  WFL    Balance:   MITZI    Therapeutic Positioning  Risk for acquired pressure injuries is increased due to relative decrease in mobility d/t hospitalization , impaired mobility, and altered skin already present.    OT interventions performed during the course of today's session:   Positioning recommendations were communicated to care team     Skin assessment: all bony prominences were assessed    Findings: sacral redness     OT recommendations for therapeutic positioning throughout hospitalization:   Follow [facility] Pressure Injury Prevention Protocol    Patient Education:  Patient provided with verbal education education regarding OT role/goals/POC, fall prevention, safety awareness, Discharge/DME recommendations, and pressure ulcer prevention.  Understanding was verbalized.     Patient left HOB elevated with all lines intact, call button in reach, and RN notified.    GOALS:   Multidisciplinary Problems       Occupational Therapy Goals          Problem: Occupational Therapy    Goal Priority Disciplines Outcome  Interventions   Occupational Therapy Goal     OT, PT/OT Progressing    Description: LTG: Pt will perform basic ADLs and ADL transfers with Modified independence using LRAD by discharge.    STG: to be met by 7/9/25    Pt will complete grooming standing at sink with LRAD with SBA.  Pt will complete UB dressing with SBA.  Pt will complete LB dressing with SBA using LRAD.  Pt will complete toileting with SBA using LRAD.  Pt will complete functional mobility to/from toilet and toilet transfer with SBA using LRAD.                        History:     Past Medical History:   Diagnosis Date    Diabetes mellitus     Digestive disorder     Renal disorder          Past Surgical History:   Procedure Laterality Date    COLONOSCOPY N/A 2/7/2023    Procedure: COLON;  Surgeon: Simin Cruz MD;  Location: [facility] ENDOSCOPY;  Service: Gastroenterology;  Laterality: N/A;    COLONOSCOPY  2/7/2023    Procedure: COLONOSCOPY, WITH HEMORRHAGE CONTROL;  Surgeon: Simin Cruz MD;  Location: [facility] ENDOSCOPY;  Service: Gastroenterology;;    COLONOSCOPY N/A 8/21/2023    Procedure: 1000 COLON;  Surgeon: Simin Cruz MD;  Location: [facility] ENDOSCOPY;  Service: Gastroenterology;  Laterality: N/A;    COLONOSCOPY  8/21/2023    Procedure: COLONOSCOPY, WITH HEMORRHAGE CONTROL;  Surgeon: Simin Cruz MD;  Location: [facility] ENDOSCOPY;  Service: Gastroenterology;;  epi 1 cc    COLONOSCOPY, WITH 1 OR MORE BIOPSIES  2/7/2023    Procedure: COLONOSCOPY, WITH 1 OR MORE BIOPSIES;  Surgeon: Simin Cruz MD;  Location: [facility] ENDOSCOPY;  Service: Gastroenterology;;    COLONOSCOPY, WITH POLYPECTOMY USING SNARE  2/7/2023    Procedure: COLONOSCOPY, WITH POLYPECTOMY USING SNARE;  Surgeon: Simin Cruz MD;  Location: [facility] ENDOSCOPY;  Service: Gastroenterology;;    COLONOSCOPY, WITH POLYPECTOMY USING SNARE  8/21/2023    Procedure: COLONOSCOPY, WITH POLYPECTOMY USING SNARE;  Surgeon: Simin Cruz MD;  Location: [facility] ENDOSCOPY;  Service:  Gastroenterology;;       Time Tracking:     OT Date of Treatment: 06/09/25  OT Start Time: 1106  OT Stop Time: 1128  OT Total Time (min): 22 min    Billable Minutes:Evaluation Moderate Complexity.     6/9/2025

## 2025-06-09 NOTE — PLAN OF CARE
Problem: Adult Inpatient Plan of Care  Goal: Plan of Care Review  Outcome: Progressing  Goal: Optimal Comfort and Wellbeing  Outcome: Progressing  Goal: Readiness for Transition of Care  Outcome: Progressing     Problem: Bariatric Environmental Safety  Goal: Safety Maintained with Care  Outcome: Progressing     Problem: Diabetes Comorbidity  Goal: Blood Glucose Level Within Targeted Range  Outcome: Progressing     Problem: Sepsis/Septic Shock  Goal: Optimal Coping  Outcome: Progressing     Problem: Wound  Goal: Optimal Coping  Outcome: Progressing  Goal: Improved Oral Intake  Outcome: Progressing

## 2025-06-09 NOTE — CONSULTS
[facility] - 7th Floor ICU  Wound Care    Patient Name:  Vi Sultana   MRN:  [id number]  Date: 6/9/2025  Diagnosis: Sepsis    History:     Past Medical History:   Diagnosis Date    Diabetes mellitus     Digestive disorder     Renal disorder        Social History[1]    Precautions:     Allergies as of 06/07/2025 - Reviewed 06/07/2025   Allergen Reaction Noted    Opioids - morphine analogues Itching 02/06/2023    Avocado (laurus persea)  02/07/2023       WOC Assessment Details/Treatment        06/09/25 1045   WOCN Assessment   Visit Date 06/09/25   Visit Time 1045   Consult Type Wound Care Tech   WOCN Speciality Wound   Wound moisture   Intervention chart review;assessed;applied;orders   Teaching on-going        Wound 06/07/25 2030 Skin Tear Sacral spine   Date First Assessed/Time First Assessed: 06/07/25 2030   Present on Original Admission: Yes  Primary Wound Type: Skin Tear  Location: Sacral spine  Wound Outcome: (c)    Wound Image    Dressing Appearance Intact;Moist drainage   Drainage Amount Scant   Drainage Characteristics/Odor Serosanguineous   Appearance Pink;Red   Tissue loss description Partial thickness   Black (%), Wound Tissue Color 0 %   Red (%), Wound Tissue Color 100 %   Yellow (%), Wound Tissue Color 0 %   Periwound Area Ecchymotic;Dry;Redness   Wound Edges Irregular   Care Cleansed with:;Soap and water;Applied:;Skin Barrier        Wound 06/09/25 0800 Moisture associated dermatitis Pelvis   Date First Assessed/Time First Assessed: 06/09/25 0800   Primary Wound Type: Moisture associated dermatitis  Location: Pelvis   Wound Image    Dressing Appearance Intact;Dried drainage   Drainage Amount Scant   Drainage Characteristics/Odor Serous   Appearance Red;Moist;Intact   Tissue loss description Not applicable   Periwound Area Moist;Redness;Excoriated   Care Cleansed with:;Soap and water   Dressing Applied;Absorptive Pad     WOCN consulted for sacrum and perineum. Discussed POC w/ nurse  Mally. No family at bedside. Pt. On ICU SCOTT. Pt. Has been downgraded. Pt. Does have an immerse ordered for patient. Treatment recommendations: Sacrum: clean w/ remedy cleanser, dry well, apply desitin to sacrum (zinc 40%). BID/prn if soilage. Pt. Is incontinent, avoid briefs. Bilateral groins/pelvis: clean w/ remedy cleanser, dry well, apply miconazole powder to areas, cover with dryer sheets from purple pack. BID/prn if soilage.  Nursing to cont. Tx recs and preventative measures. Will follow up.    06/09/2025         [1]   Social History  Socioeconomic History    Marital status:    Tobacco Use    Smoking status: Never    Smokeless tobacco: Never   Substance and Sexual Activity    Alcohol use: Not Currently     Social Drivers of Health     Financial Resource Strain: Low Risk  (6/7/2025)    Overall Financial Resource Strain (CARDIA)     Difficulty of Paying Living Expenses: Not hard at all   Food Insecurity: No Food Insecurity (6/7/2025)    Hunger Vital Sign     Worried About Running Out of Food in the Last Year: Never true     Ran Out of Food in the Last Year: Never true   Transportation Needs: No Transportation Needs (6/7/2025)    PRAPARE - Transportation     Lack of Transportation (Medical): No     Lack of Transportation (Non-Medical): No   Physical Activity: Inactive (3/25/2025)    Exercise Vital Sign     Days of Exercise per Week: 0 days     Minutes of Exercise per Session: 0 min   Stress: No Stress Concern Present (6/7/2025)    [employer] [place] [employer] - Occupational Stress Questionnaire     Feeling of Stress : Not at all   Housing Stability: Low Risk  (6/7/2025)    Housing Stability Vital Sign     Unable to Pay for Housing in the Last Year: No     Number of Times Moved in the Last Year: 0     Homeless in the Last Year: No

## 2025-06-09 NOTE — PLAN OF CARE
Problem: Occupational Therapy  Goal: Occupational Therapy Goal  Description: LTG: Pt will perform basic ADLs and ADL transfers with Modified independence using LRAD by discharge.    STG: to be met by 7/9/25    Pt will complete grooming standing at sink with LRAD with SBA.  Pt will complete UB dressing with SBA.  Pt will complete LB dressing with SBA using LRAD.  Pt will complete toileting with SBA using LRAD.  Pt will complete functional mobility to/from toilet and toilet transfer with SBA using LRAD.   Outcome: Progressing

## 2025-06-09 NOTE — H&P
[facility] Hospital Medicine History & Physical Examination       Patient Name: Vi Sultana  MRN: [id number]  Patient Class: IP- Inpatient   Admission Date: 6/7/2025   Admitting Physician:  Service   Attending Physician: Ady Del Castillo MD  Primary Care Provider: Teresa Santana MD  Face-to-Face encounter date: 06/09/2025  Code Status:Code Status Discussion Note   Chief Complaint: No chief complaint on file.         Patient information was obtained from patient, patient's family, past medical records and ER records.     HISTORY OF PRESENT ILLNESS:   Vi Sultana is a 69 y.o. female who  has a past medical history of Diabetes mellitus, Digestive disorder, and Renal disorder.. The patient presented to [facility] on 6/7/2025     69-year-old female admitted on 06/07/2025 to the ICU secondary to acute DKA.  She has a past medical history of diabetes, hypertension and hyperlipidemia.  Also with noted chronic kidney disease.  She has urosepsis likely the source of her DKA.  Currently on Rocephin.  Urine cultures growing Klebsiella and Enterobacter.  She was noted to have acute on chronic kidney disease.  Improving with IV fluids and treatment of her DKA.  She had an echocardiogram which showed an EF of 45-50% with a dilated RV.  Concern for possible pulmonary embolus.  V/Q scan was negative.  Possibly some afterload hypertension.  We her anion gap closed after treatment of DKA.  She had being downgraded to hospital medicine on 6/9.    PAST MEDICAL HISTORY:     Past Medical History:   Diagnosis Date    Diabetes mellitus     Digestive disorder     Renal disorder        PAST SURGICAL HISTORY:     Past Surgical History:   Procedure Laterality Date    COLONOSCOPY N/A 2/7/2023    Procedure: COLON;  Surgeon: Simin Cruz MD;  Location: [facility] ENDOSCOPY;  Service: Gastroenterology;  Laterality: N/A;    COLONOSCOPY  2/7/2023    Procedure: COLONOSCOPY, WITH HEMORRHAGE CONTROL;  Surgeon: Simin  MD Nancy;  Location: [facility] ENDOSCOPY;  Service: Gastroenterology;;    COLONOSCOPY N/A 8/21/2023    Procedure: 1000 COLON;  Surgeon: Simin Cruz MD;  Location: [facility] ENDOSCOPY;  Service: Gastroenterology;  Laterality: N/A;    COLONOSCOPY  8/21/2023    Procedure: COLONOSCOPY, WITH HEMORRHAGE CONTROL;  Surgeon: Simin Cruz MD;  Location: [facility] ENDOSCOPY;  Service: Gastroenterology;;  epi 1 cc    COLONOSCOPY, WITH 1 OR MORE BIOPSIES  2/7/2023    Procedure: COLONOSCOPY, WITH 1 OR MORE BIOPSIES;  Surgeon: Simin Cruz MD;  Location: [facility] ENDOSCOPY;  Service: Gastroenterology;;    COLONOSCOPY, WITH POLYPECTOMY USING SNARE  2/7/2023    Procedure: COLONOSCOPY, WITH POLYPECTOMY USING SNARE;  Surgeon: Simin Cruz MD;  Location: [facility] ENDOSCOPY;  Service: Gastroenterology;;    COLONOSCOPY, WITH POLYPECTOMY USING SNARE  8/21/2023    Procedure: COLONOSCOPY, WITH POLYPECTOMY USING SNARE;  Surgeon: Simin Cruz MD;  Location: [facility] ENDOSCOPY;  Service: Gastroenterology;;       ALLERGIES:   Opioids - morphine analogues and Avocado (laurus persea)    FAMILY HISTORY:   Reviewed and negative    SOCIAL HISTORY:     Social History     Tobacco Use    Smoking status: Never    Smokeless tobacco: Never   Substance Use Topics    Alcohol use: Not Currently        HOME MEDICATIONS:     Prior to Admission medications    Medication Sig Start Date End Date Taking? Authorizing Provider   allopurinoL (ZYLOPRIM) 100 MG tablet Take 100 mg by mouth once daily.   Yes Provider, Historical   ARIPiprazole (ABILIFY) 10 MG Tab Take 10 mg by mouth once daily.   Yes Provider, Historical   aspirin (ECOTRIN) 81 MG EC tablet Take 81 mg by mouth once daily.   Yes Provider, Historical   ferrous gluconate (FERGON) 324 MG tablet Take 324 mg by mouth daily with breakfast.   Yes Provider, Historical   furosemide (LASIX) 40 MG tablet Take 40 mg by mouth 3 (three) times a week. MWF   Yes Provider, Historical   rosuvastatin (CRESTOR) 40 MG  Tab Take 10 mg by mouth every evening.   Yes Provider, Historical   semaglutide (OZEMPIC) 0.25 mg or 0.5 mg (2 mg/3 mL) pen injector Inject 0.5 mg into the skin every 7 days. Takes on Thursdays   Yes Provider, Historical   sertraline (ZOLOFT) 100 MG tablet Take 100 mg by mouth once daily.   Yes Provider, Historical   calcium carbonate/vitamin D3 (CALTRATE 600 + D ORAL) Take 1 tablet by mouth 2 (two) times a day.    Provider, Historical   omega-3 fatty acids/fish oil (FISH OIL-OMEGA-3 FATTY ACIDS) 300-1,000 mg capsule Take 1 capsule by mouth 2 (two) times daily. 3600 mg fish oil/1296 mg  mg DHA supplement    Provider, Historical       REVIEW OF SYSTEMS:   Except as documented, all other systems reviewed and negative     PHYSICAL EXAM:     VITAL SIGNS: 24 HRS MIN & MAX LAST   Temp  Min: 97.9 °F (36.6 °C)  Max: 99.1 °F (37.3 °C) 98.9 °F (37.2 °C)   BP  Min: 100/63  Max: 132/53 126/62   Pulse  Min: 80  Max: 93  91   Resp  Min: 13  Max: 31 (!) 29   SpO2  Min: 96 %  Max: 100 % 98 %       General appearance: Well-developed, well-nourished female in no apparent distress.  HENT: Atraumatic head. Moist mucous membranes of oral cavity.  Eyes: Normal extraocular movements.   Neck: Supple.   Lungs: Clear to auscultation bilaterally. No wheezing present.   Heart: Regular rate and rhythm. S1 and S2 present with no murmurs/gallop/rub. No pedal edema. No JVD present.   Abdomen: Soft, non-distended, non-tender. No rebound tenderness/guarding. Bowel sounds are normal.   Extremities: No cyanosis, clubbing, or edema.  Skin: No Rash.   Neuro: Motor and sensory exams grossly intact. Good tone. Muscle strength 5/5 in all 4 extremities  Psych/mental status: Appropriate mood and affect. Responds appropriately to questions.     LABS AND IMAGING:     Recent Labs   Lab 06/07/25  1444 06/08/25  0426 06/09/25  0434   WBC 15.10* 7.01 8.91   RBC 4.03* 3.85* 3.20*   HGB 10.7* 10.1* 8.4*   HCT 34.7* 33.9* 27.6*   MCV 86.1 88.1 86.3   MCH 26.6*  26.2* 26.3*   MCHC 30.8* 29.8* 30.4*   RDW 16.8 16.7 16.7    143 141   MPV 11.7* 12.0* 12.1*       Recent Labs   Lab 06/07/25  1444 06/07/25  1758 06/07/25  2159 06/08/25  0426 06/08/25  1702 06/09/25  0010 06/09/25  0434 06/09/25  0819     --    < > 139   < > 139 139 136   K 4.3  --    < > 4.2   < > 3.5 3.3* 3.3*     --    < > 106   < > 107 107 106   CO2 15*  --    < > 19*   < > 21* 19* 21*   BUN 43.4*  --    < > 44.8*   < > 49.1* 46.2* 49.4*   CREATININE 2.00*  --    < > 2.04*   < > 1.80* 1.74* 1.65*   *  --    < > 225*   < > 92 120* 215*   CALCIUM 9.1  --    < > 9.3   < > 8.5 8.4 8.4   PH  --  7.430  --   --   --   --   --   --    MG 1.80  --   --  1.70   < > 1.80 1.90 1.80   ALBUMIN 2.6*  --   --  2.4*  --   --  1.9*  --    PROT 6.1  --   --  6.5  --   --  5.5*  --    ALKPHOS 90  --   --  101  --   --  65  --    ALT 14  --   --  14  --   --  12  --    AST 19  --   --  19  --   --  21  --    BILITOT 2.2*  --   --  1.4  --   --  0.6  --     < > = values in this interval not displayed.       Microbiology Results (last 7 days)       Procedure Component Value Units Date/Time    Blood culture x two cultures. Draw prior to antibiotics. [[id number]]  (Abnormal) Collected: 06/07/25 5539    Order Status: Completed Specimen: Blood from Forearm, Left Updated: 06/09/25 0759     Blood Culture Gram-negative Rods     GRAM STAIN Gram Negative Rods      Seen in gram stain of broth only      1 of 2 Anaerobic bottles positive    Blood culture x two cultures. Draw prior to antibiotics. [[id number]]  (Abnormal) Collected: 06/07/25 1446    Order Status: Completed Specimen: Blood, Venous Updated: 06/09/25 0750     Blood Culture Gram-negative Rods     GRAM STAIN Gram Negative Rods      Seen in gram stain of broth only      2 of 2 bottles positive    Urine culture [[id number]]  (Abnormal)  (Susceptibility) Collected: 06/07/25 1717    Order Status: Completed Specimen: Urine Updated: 06/09/25 0646     Urine  Culture >/= 100,000 colonies/ml Klebsiella pneumoniae ssp pneumoniae    BCID2 Panel [[id number]]  (Abnormal) Collected: 06/07/25 1446    Order Status: Completed Specimen: Blood, Venous Updated: 06/08/25 0912     CTX-M (ESBL ) Not Detected     IMP (Cabapenemase ) Not Detected     KPC resistance gene (Carbapenemase ) Not Detected     mcr-1 Not Detected     mecA ID N/A     Comment: Note: Antimicrobial resistance can occur via multiple mechanisms. A Not Detected result for antimicrobial resistance gene(s) does not indicate antimicrobial susceptibility. Subculturing is required for species identification and susceptibility testing of   isolates.        mecA/C and MREJ (MRSA) gene N/A     NDM (Carbapenemase ) Not Detected     OXA-48-like (Carbapenemase ) Not Detected     [place]/B (VRE gene) N/A     VIM (Carbapenemase ) Not Detected     Enterococcus faecalis Not Detected     Enterococcus faecium Not Detected     Listeria monocytogenes Not Detected     Staphylococcus spp. Not Detected     Staphylococcus aureus Not Detected     Staphylococcus epidermidis Not Detected     Staphylococcus lugdunensis Not Detected     Streptococcus spp. Not Detected     Streptococcus agalactiae (Group B) Not Detected     Streptococcus pneumoniae Not Detected     Streptococcus pyogenes (Group A) Not Detected     Acinetobacter calcoaceticus/baumannii complex Not Detected     Bacteroides fragilis Not Detected     Enterobacterales See Species for ID     Enterobacter cloacae complex Not Detected     Escherichia coli Not Detected     Klebsiella aerogenes Not Detected     Klebsiella oxytoca Not Detected     Klebsiella pneumoniae group Detected     Proteus spp. Not Detected     Salmonella spp. Not Detected     Serratia marcescens Not Detected     Haemophilus influenzae Not Detected     Neisseria meningitidis Not Detected     Pseudomonas aeruginosa Not Detected     Stenotrophomonas maltophilia Not Detected      Candida albicans Not Detected     Candida auris Not Detected     Candida glabrata Not Detected     Candida krusei Not Detected     Candida parapsilosis Not Detected     Candida tropicalis Not Detected     Cryptococcus neoformans/gattii Not Detected    Narrative:      The [employer] BCID2 Panel is a multiplexed nucleic acid test intended for the use with [employer]® 2.0 or [employer]® [employer]® [employer] Systems for the simultaneous qualitative detection and identification of multiple bacterial and yeast nucleic acids and select genetic determinants associated with antimicrobial resistance.  The [employer] BCID2 Panel test is performed directly on blood culture samples identified as positive by a continuous monitoring blood culture system.  Results are intended to be interpreted in conjunction with Gram stain results.             CV Ultrasound doppler venous legs bilat  Negative for deep and superficial vein thrombosis in bilateral lower   extremities.       _____________________________________  INPATIENT LIST OF MEDICATIONS   Current Medications[1]      Scheduled Meds:   cefTRIAXone (Rocephin) IV (PEDS and ADULTS)  2 g Intravenous Q24H    enoxparin  40 mg Subcutaneous Q12H    insulin glargine U-100  10 Units Subcutaneous QHS    mupirocin   Nasal BID    sodium chloride 0.9%  30 mL/kg (Ideal) Intravenous Once     Continuous Infusions:   0.9% NaCl  1,000 mL Intravenous Continuous        D5 and 0.45% NaCl   Intravenous Continuous PRN   Stopped at 06/09/25 0114    NORepinephrine bitartrate-D5W  0-3 mcg/kg/min Intravenous Continuous         PRN Meds:.  Current Facility-Administered Medications:     acetaminophen, 650 mg, Oral, Q6H PRN    D5 and 0.45% NaCl, , Intravenous, Continuous PRN    naloxone, 0.02 mg, Intravenous, PRN    sodium chloride 0.9%, 10 mL, Intravenous, Q12H PRN    sodium chloride 0.9%, 10 mL, Intravenous, PRN      VTE Prophylaxis: will be placed on appropriate DVT prophylaxis and will be advised to be as  mobile as possible and sit in a chair as tolerated  _____________________________________    ASSESSMENT & PLAN:   DKA, resolved  Severe sepsis secondary to UTI, Klebsiella  Acute on CKD  Anemia, iron deficiency  Essential Hypertension  Hyperlipidemia  Depression    Gap closed and off insulin gtt. Continue SSI and basal insulin usage.  Will adjust as needed.  Hold oral antihyperglycemics.  Zosyn changed to Rocephin for UTI.  Sensitivities back on Klebsiella.  Noted positive blood cultures on 6/7.  Will need repeat to document clearance prior to discontinuation of IV and then will plan for another 7 days oral. Leukocytosis resolved.   Renal function near baseline.  Encourage oral intake and can wean off fluids.   Vitals stable.   Resume home iron supplement and psych meds  Home in the next day or two if blood cultures clear.     Critical care diagnosis: sepsis, iv antibiotics  Critical care interventions: hands on evaluation, review of labs/radiographs/records and discussions with family  Critical care time spent: >32 minutes           Ady Del Castillo MD  3:21 PM 06/09/2025    Screening for Social Drivers for health:  Patient screened for food insecurity, housing instability, transportation needs, utility difficulties, and interpersonal safety (select all that apply as identified as concern)  []Housing or Food  []Transportation Needs  []Utility Difficulties  []Interpersonal safety  [x]None             [1]   Current Facility-Administered Medications:     0.9% NaCl infusion, 1,000 mL, Intravenous, Continuous, Anibal Esqueda MD    acetaminophen tablet 650 mg, 650 mg, Oral, Q6H PRN, Justin Aragon MD, 650 mg at 06/08/25 0355    cefTRIAXone injection 2 g, 2 g, Intravenous, Q24H, Anibal Esqueda MD, 2 g at 06/09/25 1433    dextrose 5 % and 0.45 % NaCl infusion, , Intravenous, Continuous PRN, Anibal Esqueda MD, Stopped at 06/09/25 0114    enoxaparin injection 40 mg, 40 mg, Subcutaneous, Q12H, Marcos Serrano DO, 40 mg at 06/09/25  0908    insulin glargine U-100 (Lantus) injection 10 Units, 10 Units, Subcutaneous, QHS, Anibal Esqueda MD    mupirocin 2 % ointment, , Nasal, BID, Franklin Smith Jr., MD, [facility], Given at 06/09/25 0908    naloxone 0.4 mg/mL injection 0.02 mg, 0.02 mg, Intravenous, PRN, Zach, Aashir, DO    NORepinephrine bitartrate-NaCl 8 mg/250 mL (32 mcg/mL) infusion, 0-3 mcg/kg/min, Intravenous, Continuous, Zach, Aashir, DO    sodium chloride 0.9% bolus 1,917 mL 1,917 mL, 30 mL/kg (Ideal), Intravenous, Once, Zach, Aashir, DO    sodium chloride 0.9% flush 10 mL, 10 mL, Intravenous, Q12H PRN, Zach, Aashir, DO    sodium chloride 0.9% flush 10 mL, 10 mL, Intravenous, PRN, Anibal Esqueda MD

## 2025-06-10 LAB
ALBUMIN SERPL-MCNC: 1.8 G/DL (ref 3.4–4.8)
ALBUMIN/GLOB SERPL: 0.5 RATIO (ref 1.1–2)
ALP SERPL-CCNC: 69 UNIT/L (ref 40–150)
ALT SERPL-CCNC: 18 UNIT/L (ref 0–55)
ANION GAP SERPL CALC-SCNC: 10 MEQ/L
AST SERPL-CCNC: 21 UNIT/L (ref 11–45)
BACTERIA BLD CULT: ABNORMAL
BACTERIA BLD CULT: ABNORMAL
BASOPHILS # BLD AUTO: 0.04 X10(3)/MCL
BASOPHILS NFR BLD AUTO: 0.5 %
BILIRUB SERPL-MCNC: 0.4 MG/DL
BUN SERPL-MCNC: 42.5 MG/DL (ref 9.8–20.1)
CALCIUM SERPL-MCNC: 8.2 MG/DL (ref 8.4–10.2)
CHLORIDE SERPL-SCNC: 110 MMOL/L (ref 98–107)
CO2 SERPL-SCNC: 19 MMOL/L (ref 23–31)
CREAT SERPL-MCNC: 1.41 MG/DL (ref 0.55–1.02)
CREAT/UREA NIT SERPL: 30
EOSINOPHIL # BLD AUTO: 0.13 X10(3)/MCL (ref 0–0.9)
EOSINOPHIL NFR BLD AUTO: 1.5 %
ERYTHROCYTE [DISTWIDTH] IN BLOOD BY AUTOMATED COUNT: 16.8 % (ref 11.5–17)
GFR SERPLBLD CREATININE-BSD FMLA CKD-EPI: 40 ML/MIN/1.73/M2
GLOBULIN SER-MCNC: 3.7 GM/DL (ref 2.4–3.5)
GLUCOSE SERPL-MCNC: 265 MG/DL (ref 82–115)
GRAM STN SPEC: ABNORMAL
HCT VFR BLD AUTO: 27.2 % (ref 37–47)
HGB BLD-MCNC: 8.4 G/DL (ref 12–16)
IMM GRANULOCYTES # BLD AUTO: 0.05 X10(3)/MCL (ref 0–0.04)
IMM GRANULOCYTES NFR BLD AUTO: 0.6 %
LYMPHOCYTES # BLD AUTO: 0.85 X10(3)/MCL (ref 0.6–4.6)
LYMPHOCYTES NFR BLD AUTO: 10 %
MAGNESIUM SERPL-MCNC: 1.8 MG/DL (ref 1.6–2.6)
MCH RBC QN AUTO: 26.5 PG (ref 27–31)
MCHC RBC AUTO-ENTMCNC: 30.9 G/DL (ref 33–36)
MCV RBC AUTO: 85.8 FL (ref 80–94)
MONOCYTES # BLD AUTO: 0.45 X10(3)/MCL (ref 0.1–1.3)
MONOCYTES NFR BLD AUTO: 5.3 %
NEUTROPHILS # BLD AUTO: 6.97 X10(3)/MCL (ref 2.1–9.2)
NEUTROPHILS NFR BLD AUTO: 82.1 %
NRBC BLD AUTO-RTO: 0 %
PHOSPHATE SERPL-MCNC: 2.8 MG/DL (ref 2.3–4.7)
PLATELET # BLD AUTO: 162 X10(3)/MCL (ref 130–400)
PMV BLD AUTO: 12.6 FL (ref 7.4–10.4)
POCT GLUCOSE: 254 MG/DL (ref 70–110)
POCT GLUCOSE: 315 MG/DL (ref 70–110)
POCT GLUCOSE: 344 MG/DL (ref 70–110)
POCT GLUCOSE: 349 MG/DL (ref 70–110)
POTASSIUM SERPL-SCNC: 3.5 MMOL/L (ref 3.5–5.1)
PROT SERPL-MCNC: 5.5 GM/DL (ref 5.8–7.6)
RBC # BLD AUTO: 3.17 X10(6)/MCL (ref 4.2–5.4)
SODIUM SERPL-SCNC: 139 MMOL/L (ref 136–145)
WBC # BLD AUTO: 8.49 X10(3)/MCL (ref 4.5–11.5)

## 2025-06-10 PROCEDURE — 83735 ASSAY OF MAGNESIUM: CPT

## 2025-06-10 PROCEDURE — 97116 GAIT TRAINING THERAPY: CPT

## 2025-06-10 PROCEDURE — 85025 COMPLETE CBC W/AUTO DIFF WBC: CPT

## 2025-06-10 PROCEDURE — 25000003 PHARM REV CODE 250: Performed by: INTERNAL MEDICINE

## 2025-06-10 PROCEDURE — 80053 COMPREHEN METABOLIC PANEL: CPT

## 2025-06-10 PROCEDURE — 36415 COLL VENOUS BLD VENIPUNCTURE: CPT

## 2025-06-10 PROCEDURE — 63600175 PHARM REV CODE 636 W HCPCS: Performed by: HOSPITALIST

## 2025-06-10 PROCEDURE — 25000003 PHARM REV CODE 250: Performed by: HOSPITALIST

## 2025-06-10 PROCEDURE — 97530 THERAPEUTIC ACTIVITIES: CPT

## 2025-06-10 PROCEDURE — 63600175 PHARM REV CODE 636 W HCPCS

## 2025-06-10 PROCEDURE — 11000001 HC ACUTE MED/SURG PRIVATE ROOM

## 2025-06-10 PROCEDURE — 84100 ASSAY OF PHOSPHORUS: CPT

## 2025-06-10 PROCEDURE — 21400001 HC TELEMETRY ROOM

## 2025-06-10 PROCEDURE — 63600175 PHARM REV CODE 636 W HCPCS: Performed by: INTERNAL MEDICINE

## 2025-06-10 PROCEDURE — 97535 SELF CARE MNGMENT TRAINING: CPT

## 2025-06-10 RX ORDER — INSULIN GLARGINE 100 [IU]/ML
20 INJECTION, SOLUTION SUBCUTANEOUS DAILY
Status: DISCONTINUED | OUTPATIENT
Start: 2025-06-10 | End: 2025-06-11

## 2025-06-10 RX ORDER — INSULIN ASPART 100 [IU]/ML
0-10 INJECTION, SOLUTION INTRAVENOUS; SUBCUTANEOUS
Status: DISCONTINUED | OUTPATIENT
Start: 2025-06-10 | End: 2025-06-12 | Stop reason: HOSPADM

## 2025-06-10 RX ADMIN — INSULIN GLARGINE 20 UNITS: 100 INJECTION, SOLUTION SUBCUTANEOUS at 08:06

## 2025-06-10 RX ADMIN — Medication 1 EACH: at 08:06

## 2025-06-10 RX ADMIN — Medication 81 MG: at 08:06

## 2025-06-10 RX ADMIN — INSULIN ASPART 4 UNITS: 100 INJECTION, SOLUTION INTRAVENOUS; SUBCUTANEOUS at 11:06

## 2025-06-10 RX ADMIN — INSULIN ASPART 8 UNITS: 100 INJECTION, SOLUTION INTRAVENOUS; SUBCUTANEOUS at 05:06

## 2025-06-10 RX ADMIN — Medication 400 ML: at 03:06

## 2025-06-10 RX ADMIN — INSULIN ASPART 4 UNITS: 100 INJECTION, SOLUTION INTRAVENOUS; SUBCUTANEOUS at 08:06

## 2025-06-10 RX ADMIN — CEFTRIAXONE SODIUM 2 G: 2 INJECTION, POWDER, FOR SOLUTION INTRAMUSCULAR; INTRAVENOUS at 03:06

## 2025-06-10 RX ADMIN — ARIPIPRAZOLE 10 MG: 5 TABLET ORAL at 08:06

## 2025-06-10 RX ADMIN — MICONAZOLE NITRATE: 20 POWDER TOPICAL at 08:06

## 2025-06-10 RX ADMIN — Medication: at 08:06

## 2025-06-10 RX ADMIN — ALLOPURINOL 100 MG: 100 TABLET ORAL at 08:06

## 2025-06-10 RX ADMIN — INSULIN ASPART 3 UNITS: 100 INJECTION, SOLUTION INTRAVENOUS; SUBCUTANEOUS at 04:06

## 2025-06-10 RX ADMIN — Medication 400 ML: at 12:06

## 2025-06-10 RX ADMIN — ENOXAPARIN SODIUM 40 MG: 100 INJECTION SUBCUTANEOUS at 08:06

## 2025-06-10 RX ADMIN — Medication 400 ML: at 04:06

## 2025-06-10 RX ADMIN — Medication 400 ML: at 08:06

## 2025-06-10 RX ADMIN — SERTRALINE HYDROCHLORIDE 100 MG: 50 TABLET ORAL at 08:06

## 2025-06-10 RX ADMIN — MUPIROCIN: 20 OINTMENT TOPICAL at 08:06

## 2025-06-10 RX ADMIN — MUPIROCIN: 20 OINTMENT TOPICAL at 09:06

## 2025-06-10 NOTE — PROGRESS NOTES
[facility] Hospital Medicine Progress Note        Chief Complaint: Inpatient Follow-up     HPI:     69-year-old female admitted on 06/07/2025 to the ICU secondary to acute DKA.  She has a past medical history of diabetes, hypertension and hyperlipidemia.  Also with noted chronic kidney disease.  She has urosepsis likely the source of her DKA.  Currently on Rocephin.  Urine cultures growing Klebsiella and Enterobacter.  She was noted to have acute on chronic kidney disease.  Improving with IV fluids and treatment of her DKA.  She had an echocardiogram which showed an EF of 45-50% with a dilated RV.  Concern for possible pulmonary embolus.  V/Q scan was negative.  Possibly some afterload hypertension.  We her anion gap closed after treatment of DKA.  She had being downgraded to hospital medicine on 6/9.     Interval Hx:  Patient doing okay this morning.  No new complaints.  He had bumped down her fluids yesterday but still little acidotic.  Renal function is improving.   Sugars better controlled but not at goal.  Afebrile.    Objective/physical exam:  General: In no acute distress, afebrile  Chest: Clear to auscultation bilaterally  Heart: RRR, +S1, S2, no appreciable murmur  Abdomen: Soft, nontender, BS +  MSK: Warm, no lower extremity edema, no clubbing or cyanosis  Neurologic: Alert and oriented x4, Cranial nerve II-XII intact, Strength 5/5 in all 4 extremities    VITAL SIGNS: 24 HRS MIN & MAX LAST   Temp  Min: 97.5 °F (36.4 °C)  Max: 99.1 °F (37.3 °C) 97.5 °F (36.4 °C)   BP  Min: 113/68  Max: 144/61 113/68   Pulse  Min: 81  Max: 97  81   Resp  Min: 21  Max: 29 (!) 24   SpO2  Min: 94 %  Max: 98 % 96 %       Recent Labs   Lab 06/08/25  0426 06/09/25  0434 06/10/25  0329   WBC 7.01 8.91 8.49   RBC 3.85* 3.20* 3.17*   HGB 10.1* 8.4* 8.4*   HCT 33.9* 27.6* 27.2*   MCV 88.1 86.3 85.8   MCH 26.2* 26.3* 26.5*   MCHC 29.8* 30.4* 30.9*   RDW 16.7 16.7 16.8    141 162   MPV 12.0* 12.1*  12.6*       Recent Labs   Lab 06/07/25  1758 06/07/25  2159 06/08/25  0426 06/08/25  1702 06/09/25  0434 06/09/25  0819 06/10/25  0329   NA  --    < > 139   < > 139 136 139   K  --    < > 4.2   < > 3.3* 3.3* 3.5   CL  --    < > 106   < > 107 106 110*   CO2  --    < > 19*   < > 19* 21* 19*   BUN  --    < > 44.8*   < > 46.2* 49.4* 42.5*   CREATININE  --    < > 2.04*   < > 1.74* 1.65* 1.41*   GLU  --    < > 225*   < > 120* 215* 265*   CALCIUM  --    < > 9.3   < > 8.4 8.4 8.2*   PH 7.430  --   --   --   --   --   --    MG  --   --  1.70   < > 1.90 1.80 1.80   ALBUMIN  --   --  2.4*  --  1.9*  --  1.8*   PROT  --   --  6.5  --  5.5*  --  5.5*   ALKPHOS  --   --  101  --  65  --  69   ALT  --   --  14  --  12  --  18   AST  --   --  19  --  21  --  21   BILITOT  --   --  1.4  --  0.6  --  0.4    < > = values in this interval not displayed.          Microbiology Results (last 7 days)       Procedure Component Value Units Date/Time    Blood Culture [[id number]] Collected: 06/09/25 1626    Order Status: Resulted Specimen: Blood Updated: 06/09/25 1648    Blood Culture [[id number]] Collected: 06/09/25 1626    Order Status: Resulted Specimen: Blood Updated: 06/09/25 1648    Blood culture x two cultures. Draw prior to antibiotics. [[id number]]  (Abnormal) Collected: 06/07/25 1452    Order Status: Completed Specimen: Blood from Forearm, Left Updated: 06/09/25 0750     Blood Culture Gram-negative Rods     GRAM STAIN Gram Negative Rods      Seen in gram stain of broth only      1 of 2 Anaerobic bottles positive    Blood culture x two cultures. Draw prior to antibiotics. [[id number]]  (Abnormal) Collected: 06/07/25 1446    Order Status: Completed Specimen: Blood, Venous Updated: 06/09/25 0750     Blood Culture Gram-negative Rods     GRAM STAIN Gram Negative Rods      Seen in gram stain of broth only      2 of 2 bottles positive    Urine culture [[id number]]  (Abnormal)  (Susceptibility) Collected: 06/07/25 1717    Order Status:  Completed Specimen: Urine Updated: 06/09/25 0646     Urine Culture >/= 100,000 colonies/ml Klebsiella pneumoniae ssp pneumoniae    BCID2 Panel [[id number]]  (Abnormal) Collected: 06/07/25 1446    Order Status: Completed Specimen: Blood, Venous Updated: 06/08/25 0912     CTX-M (ESBL ) Not Detected     IMP (Cabapenemase ) Not Detected     KPC resistance gene (Carbapenemase ) Not Detected     mcr-1 Not Detected     mecA ID N/A     Comment: Note: Antimicrobial resistance can occur via multiple mechanisms. A Not Detected result for antimicrobial resistance gene(s) does not indicate antimicrobial susceptibility. Subculturing is required for species identification and susceptibility testing of   isolates.        mecA/C and MREJ (MRSA) gene N/A     NDM (Carbapenemase ) Not Detected     OXA-48-like (Carbapenemase ) Not Detected     [place]/B (VRE gene) N/A     VIM (Carbapenemase ) Not Detected     Enterococcus faecalis Not Detected     Enterococcus faecium Not Detected     Listeria monocytogenes Not Detected     Staphylococcus spp. Not Detected     Staphylococcus aureus Not Detected     Staphylococcus epidermidis Not Detected     Staphylococcus lugdunensis Not Detected     Streptococcus spp. Not Detected     Streptococcus agalactiae (Group B) Not Detected     Streptococcus pneumoniae Not Detected     Streptococcus pyogenes (Group A) Not Detected     Acinetobacter calcoaceticus/baumannii complex Not Detected     Bacteroides fragilis Not Detected     Enterobacterales See Species for ID     Enterobacter cloacae complex Not Detected     Escherichia coli Not Detected     Klebsiella aerogenes Not Detected     Klebsiella oxytoca Not Detected     Klebsiella pneumoniae group Detected     Proteus spp. Not Detected     Salmonella spp. Not Detected     Serratia marcescens Not Detected     Haemophilus influenzae Not Detected     Neisseria meningitidis Not Detected     Pseudomonas aeruginosa  Not Detected     Stenotrophomonas maltophilia Not Detected     Candida albicans Not Detected     Candida auris Not Detected     Candida glabrata Not Detected     Candida krusei Not Detected     Candida parapsilosis Not Detected     Candida tropicalis Not Detected     Cryptococcus neoformans/gattii Not Detected    Narrative:      The [employer] BCID2 Panel is a multiplexed nucleic acid test intended for the use with [employer]Prop® 2.0 or PayProp® [employer] Imagine Systems for the simultaneous qualitative detection and identification of multiple bacterial and yeast nucleic acids and select genetic determinants associated with antimicrobial resistance.  The [employer] BCID2 Panel test is performed directly on blood culture samples identified as positive by a continuous monitoring blood culture system.  Results are intended to be interpreted in conjunction with Gram stain results.             Radiology:  CV Ultrasound doppler venous legs bilat  Negative for deep and superficial vein thrombosis in bilateral lower   extremities.         Medications:  Scheduled Meds:   allopurinoL  100 mg Oral Daily    ARIPiprazole  10 mg Oral Daily    aspirin  81 mg Oral Daily    cefTRIAXone (Rocephin) IV (PEDS and ADULTS)  2 g Intravenous Q24H    electrolytes-dextrose  400 mL Oral Q4H    enoxparin  40 mg Subcutaneous Q12H    ferrous sulfate  1 tablet Oral Daily    insulin glargine U-100  10 Units Subcutaneous QHS    miconazole NITRATE 2 %   Topical (Top) BID    mupirocin   Nasal BID    sertraline  100 mg Oral Daily    sodium chloride 0.9%  30 mL/kg (Ideal) Intravenous Once    zinc oxide-cod liver oil   Topical (Top) BID     Continuous Infusions:   D5 and 0.45% NaCl   Intravenous Continuous PRN   Stopped at 06/09/25 0114     PRN Meds:.  Current Facility-Administered Medications:     acetaminophen, 650 mg, Oral, Q6H PRN    D5 and 0.45% NaCl, , Intravenous, Continuous PRN    dextrose 50%, 12.5 g, Intravenous, PRN    dextrose 50%, 25 g,  Intravenous, PRN    glucagon (human recombinant), 1 mg, Intramuscular, PRN    glucose, 16 g, Oral, PRN    glucose, 24 g, Oral, PRN    insulin aspart U-100, 0-5 Units, Subcutaneous, QID (AC + HS) PRN    naloxone, 0.02 mg, Intravenous, PRN    sodium chloride 0.9%, 10 mL, Intravenous, Q12H PRN    sodium chloride 0.9%, 10 mL, Intravenous, PRN    Nutrition:  Nutrition consulted. Most recent weight and BMI monitored-     Measurements:  Wt Readings from Last 1 Encounters:   06/08/25 131.5 kg (289 lb 14.5 oz)   Body mass index is 44.08 kg/m².    Patient has been screened and assessed by RD.    Malnutrition Type:  Context:    Level:      Malnutrition Characteristic Summary:       Interventions/Recommendations (treatment strategy):           Assessment/Plan:   DKA, resolved  Severe sepsis secondary to UTI, Klebsiella  Acute on CKD  Anemia, iron deficiency  Essential Hypertension  Hyperlipidemia  Depression     Patient is little acidotic this morning.  Currently off of IV fluids.  I encouraged free water intake.    Her blood sugars are better controlled.  Will continue to monitor with sliding scale Lantus.  Increase Lantus to 20 units this morning  Continue Rocephin, day 4 of antibiosis.  Repeat blood cultures drawn yesterday.  Pending at this time.  Once they returned negative will plan for 7 days of oral antibiotics from negative cultures.  Continue iron supplementation and psych meds.    PT and OT are working with her.  Pending recommendations.      Ady Del Castillo MD   06/10/2025     All diagnosis and differential diagnosis have been reviewed; assessment and plan has been documented; I have personally reviewed the labs and test results that are presently available; I have reviewed the patients medication list; I have reviewed the consulting providers response and recommendations. I have reviewed or attempted to review medical records based upon their availability    All of the patient's questions have been  addressed and  answered. Patient's is agreeable to the above stated plan. I will continue to monitor closely and make adjustments to medical management as needed.  _____________________________________________________________________

## 2025-06-10 NOTE — PLAN OF CARE
Problem: Physical Therapy  Goal: Physical Therapy Goal  Description: Goals to be met by: 25     Patient will increase functional independence with mobility by performin. Supine to sit with [place]  2. Sit to stand transfer with Modified [place]  3. Bed to chair transfer with Modified [place] using Rolling Walker  4. Gait 100 ft w/ RW, SBA    Outcome: Progressing

## 2025-06-10 NOTE — PLAN OF CARE
Problem: Adult Inpatient Plan of Care  Goal: Plan of Care Review  Outcome: Progressing  Goal: Patient-Specific Goal (Individualized)  Outcome: Progressing  Goal: Absence of Hospital-Acquired Illness or Injury  Outcome: Progressing  Goal: Optimal Comfort and Wellbeing  Outcome: Progressing  Goal: Readiness for Transition of Care  Outcome: Progressing     Problem: Bariatric Environmental Safety  Goal: Safety Maintained with Care  Outcome: Progressing     Problem: Diabetes Comorbidity  Goal: Blood Glucose Level Within Targeted Range  Outcome: Progressing     Problem: Sepsis/Septic Shock  Goal: Optimal Coping  Outcome: Progressing  Goal: Absence of Bleeding  Outcome: Progressing  Goal: Blood Glucose Level Within Targeted Range  Outcome: Progressing  Goal: Absence of Infection Signs and Symptoms  Outcome: Progressing  Goal: Optimal Nutrition Intake  Outcome: Progressing     Problem: Wound  Goal: Optimal Coping  Outcome: Progressing  Goal: Optimal Functional Ability  Outcome: Progressing  Goal: Absence of Infection Signs and Symptoms  Outcome: Progressing  Goal: Improved Oral Intake  Outcome: Progressing  Goal: Optimal Pain Control and Function  Outcome: Progressing  Goal: Skin Health and Integrity  Outcome: Progressing  Goal: Optimal Wound Healing  Outcome: Progressing     Problem: Infection  Goal: Absence of Infection Signs and Symptoms  Outcome: Progressing     Problem: Skin Injury Risk Increased  Goal: Skin Health and Integrity  Outcome: Progressing

## 2025-06-10 NOTE — PT/OT/SLP PROGRESS
Physical Therapy Treatment    Patient Name:  Vi Sultana   MRN:  [id number]    Recommendations:     Discharge therapy intensity: Low Intensity Therapy   Discharge Equipment Recommendations: walker, rolling  Barriers to discharge: Impaired mobility and Ongoing medical needs    Assessment:     Vi Sultana is a 69 y.o. female admitted with a medical diagnosis of sepsis 2/2 UTI, MYLA on CKD, metabolic acidosis, AMS, fall at home.  She presents with the following impairments/functional limitations: weakness, impaired endurance, impaired functional mobility, impaired self care skills, impaired skin.    Rehab Prognosis: Good; patient would benefit from acute skilled PT services to address these deficits and reach maximum level of function.    Recent Surgery: * No surgery found *      Plan:     During this hospitalization, patient would benefit from acute PT services 5 x/week to address the identified rehab impairments via gait training, therapeutic activities, therapeutic exercises, neuromuscular re-education and progress toward the following goals:    Plan of Care Expires:  07/09/25    Subjective     Chief Complaint: none  Patient/Family Comments/goals:   Pain/Comfort:  Pain Rating 1: 0/10      Objective:     Communicated with RN prior to session.  Patient found up in chair with telemetry, pulse ox (continuous), peripheral IV, hernandez catheter upon PT entry to room.     General Precautions: Standard, fall  Orthopedic Precautions: N/A  Braces: N/A  Respiratory Status: Room air  Blood Pressure:   Skin Integrity: Visible skin intact      Functional Mobility:  Bed Mobility:     Sit to Supine: minimum assistance  Transfers:     Sit to Stand:  minimum assistance with rolling walker  Toilet Transfer: CGA w/ RW  Gait: Pt ambulated 15', 15' w/ RW, CGA   HR reaching 140's with short distance ambulation and pt SOB    Therapeutic Activities/Exercises:      Co-Treatment: No    Education Provided:  Role and goals of PT, transfer  training, bed mobility, gait training, balance training, safety awareness, assistive device, strengthening exercises, and importance of participating in PT to return to PLOF.         Patient left HOB elevated with all lines intact and call button in reach    GOALS:   Multidisciplinary Problems       Physical Therapy Goals          Problem: Physical Therapy    Goal Priority Disciplines Outcome Interventions   Physical Therapy Goal     PT, PT/OT Progressing    Description: Goals to be met by: 25     Patient will increase functional independence with mobility by performin. Supine to sit with [place]  2. Sit to stand transfer with Modified [place]  3. Bed to chair transfer with Modified [place] using Rolling Walker  4. Gait 100 ft w/ RW, SBA                         Time Tracking:     PT Received On: 06/10/25  PT Start Time: 1038     PT Stop Time: 1102  PT Total Time (min): 24 min     Billable Minutes: Gait Training 10 and Therapeutic Activity 14    Treatment Type: Treatment  PT/PTA: PT     Number of PTA visits since last PT visit: 1     06/10/2025

## 2025-06-10 NOTE — PT/OT/SLP PROGRESS
Occupational Therapy   Treatment    Name: Vi Sultana  MRN: [id number]    Recommendations:     Recommended therapy intensity at discharge: Low Intensity Therapy   Discharge Equipment Recommendations:  walker, rolling  Barriers to discharge:  Other (Comment) (ongoing medical needs)    Assessment:     Vi Sultana is a 69 y.o. female with a medical diagnosis of sepsis 2/2 UTI, MYLA on CKD, metabolic acidosis, AMS, fall at home. Performance deficits affecting function are weakness, impaired endurance, impaired self care skills, impaired functional mobility, gait instability, impaired balance, decreased safety awareness, decreased lower extremity function, decreased upper extremity function, impaired skin. Pt required min A for bed mobility and functional mobility using rolling walker. Recommend low intensity therapy upon d/c as pt will have 24/7 assist from her daughter upon d/c.     Rehab Prognosis:  Good; patient would benefit from acute skilled OT services to address these deficits and reach maximum level of function.       Plan:     Patient to be seen 5 x/week to address the above listed problems via self-care/home management, therapeutic activities, therapeutic exercises  Plan of Care Expires: 07/09/25  Plan of Care Reviewed with: patient    Subjective     Pain/Comfort:  Pain Rating 1: 0/10    Objective:     Communicated with: RN prior to session.  Patient found HOB elevated with telemetry, pulse ox (continuous), peripheral IV, hernandez catheter upon OT entry to room.    General Precautions: Standard, fall    Orthopedic Precautions:N/A  Braces: N/A  Respiratory Status: Room air     Occupational Performance:     Functional Mobility/Transfers:  Bed mobility:    Scooting: minimum assistance  Supine to Sit: minimum assistance  Transfers: Sit to Stand: minimum assistance with rolling walker  Bed to Chair: minimum assistance with rolling walker using Step Transfer    Activities of Daily Living:  Lower Body Dressing:  moderate assistance to don brief.     Balance:   Static sitting balance SBA    Therapeutic Positioning    OT interventions performed during the course of today's session in an effort to prevent and/or reduce acquired pressure injuries:   Therapeutic positioning was provided at the conclusion of session to offload all bony prominences for the prevention and/or reduction of pressure injuries    Skin assessment: all bony prominences were assessed    Findings: Visible skin intact.     [facility] 6 Click ADL: 14    Patient Education:  Patient provided with verbal education education regarding OT role/goals/POC, fall prevention, safety awareness, Discharge/DME recommendations, and pressure ulcer prevention.  Understanding was verbalized.      Patient left up in chair with all lines intact, call button in reach, geomat cushion, ifeanyi pad in place, and RN notified.    GOALS:   Multidisciplinary Problems       Occupational Therapy Goals          Problem: Occupational Therapy    Goal Priority Disciplines Outcome Interventions   Occupational Therapy Goal     OT, PT/OT Progressing    Description: LTG: Pt will perform basic ADLs and ADL transfers with Modified independence using LRAD by discharge.    STG: to be met by 7/9/25    Pt will complete grooming standing at sink with LRAD with SBA.  Pt will complete UB dressing with SBA.  Pt will complete LB dressing with SBA using LRAD.  Pt will complete toileting with SBA using LRAD.  Pt will complete functional mobility to/from toilet and toilet transfer with SBA using LRAD.                        Time Tracking:     OT Date of Treatment: 06/10/25  OT Start Time: 0944  OT Stop Time: 1007  OT Total Time (min): 23 min    Billable Minutes:Self Care/Home Management 1.   Therapeutic Activity 1.     OT/SUDEEP: OT     Number of SUDEEP visits since last OT visit: 1    6/10/2025

## 2025-06-11 DIAGNOSIS — Z12.31 OTHER SCREENING MAMMOGRAM: Primary | ICD-10-CM

## 2025-06-11 LAB
ALBUMIN SERPL-MCNC: 1.8 G/DL (ref 3.4–4.8)
ALBUMIN/GLOB SERPL: 0.5 RATIO (ref 1.1–2)
ALP SERPL-CCNC: 67 UNIT/L (ref 40–150)
ALT SERPL-CCNC: 16 UNIT/L (ref 0–55)
ANION GAP SERPL CALC-SCNC: 9 MEQ/L
AST SERPL-CCNC: 13 UNIT/L (ref 11–45)
BASOPHILS # BLD AUTO: 0.04 X10(3)/MCL
BASOPHILS NFR BLD AUTO: 0.5 %
BILIRUB SERPL-MCNC: 0.3 MG/DL
BUN SERPL-MCNC: 30.4 MG/DL (ref 9.8–20.1)
CALCIUM SERPL-MCNC: 8.5 MG/DL (ref 8.4–10.2)
CHLORIDE SERPL-SCNC: 112 MMOL/L (ref 98–107)
CO2 SERPL-SCNC: 20 MMOL/L (ref 23–31)
CREAT SERPL-MCNC: 1.2 MG/DL (ref 0.55–1.02)
CREAT/UREA NIT SERPL: 25
EOSINOPHIL # BLD AUTO: 0.1 X10(3)/MCL (ref 0–0.9)
EOSINOPHIL NFR BLD AUTO: 1.2 %
ERYTHROCYTE [DISTWIDTH] IN BLOOD BY AUTOMATED COUNT: 16.7 % (ref 11.5–17)
GFR SERPLBLD CREATININE-BSD FMLA CKD-EPI: 49 ML/MIN/1.73/M2
GLOBULIN SER-MCNC: 3.6 GM/DL (ref 2.4–3.5)
GLUCOSE SERPL-MCNC: 240 MG/DL (ref 82–115)
HCT VFR BLD AUTO: 30.9 % (ref 37–47)
HGB BLD-MCNC: 9.3 G/DL (ref 12–16)
IMM GRANULOCYTES # BLD AUTO: 0.04 X10(3)/MCL (ref 0–0.04)
IMM GRANULOCYTES NFR BLD AUTO: 0.5 %
LYMPHOCYTES # BLD AUTO: 0.92 X10(3)/MCL (ref 0.6–4.6)
LYMPHOCYTES NFR BLD AUTO: 10.8 %
MAGNESIUM SERPL-MCNC: 1.7 MG/DL (ref 1.6–2.6)
MCH RBC QN AUTO: 26 PG (ref 27–31)
MCHC RBC AUTO-ENTMCNC: 30.1 G/DL (ref 33–36)
MCV RBC AUTO: 86.3 FL (ref 80–94)
MONOCYTES # BLD AUTO: 0.48 X10(3)/MCL (ref 0.1–1.3)
MONOCYTES NFR BLD AUTO: 5.6 %
NEUTROPHILS # BLD AUTO: 6.92 X10(3)/MCL (ref 2.1–9.2)
NEUTROPHILS NFR BLD AUTO: 81.4 %
NRBC BLD AUTO-RTO: 0 %
PHOSPHATE SERPL-MCNC: 2.5 MG/DL (ref 2.3–4.7)
PLATELET # BLD AUTO: 185 X10(3)/MCL (ref 130–400)
PMV BLD AUTO: 11.9 FL (ref 7.4–10.4)
POCT GLUCOSE: 199 MG/DL (ref 70–110)
POCT GLUCOSE: 209 MG/DL (ref 70–110)
POCT GLUCOSE: 217 MG/DL (ref 70–110)
POCT GLUCOSE: 220 MG/DL (ref 70–110)
POCT GLUCOSE: 237 MG/DL (ref 70–110)
POTASSIUM SERPL-SCNC: 3.1 MMOL/L (ref 3.5–5.1)
PROT SERPL-MCNC: 5.4 GM/DL (ref 5.8–7.6)
RBC # BLD AUTO: 3.58 X10(6)/MCL (ref 4.2–5.4)
SODIUM SERPL-SCNC: 141 MMOL/L (ref 136–145)
WBC # BLD AUTO: 8.5 X10(3)/MCL (ref 4.5–11.5)

## 2025-06-11 PROCEDURE — 85025 COMPLETE CBC W/AUTO DIFF WBC: CPT

## 2025-06-11 PROCEDURE — 63600175 PHARM REV CODE 636 W HCPCS

## 2025-06-11 PROCEDURE — 25000003 PHARM REV CODE 250: Performed by: HOSPITALIST

## 2025-06-11 PROCEDURE — 97116 GAIT TRAINING THERAPY: CPT | Mod: CQ

## 2025-06-11 PROCEDURE — 63600175 PHARM REV CODE 636 W HCPCS: Performed by: HOSPITALIST

## 2025-06-11 PROCEDURE — 11000001 HC ACUTE MED/SURG PRIVATE ROOM

## 2025-06-11 PROCEDURE — 97530 THERAPEUTIC ACTIVITIES: CPT | Mod: CQ

## 2025-06-11 PROCEDURE — 36415 COLL VENOUS BLD VENIPUNCTURE: CPT

## 2025-06-11 PROCEDURE — 80053 COMPREHEN METABOLIC PANEL: CPT

## 2025-06-11 PROCEDURE — 84100 ASSAY OF PHOSPHORUS: CPT

## 2025-06-11 PROCEDURE — 83735 ASSAY OF MAGNESIUM: CPT

## 2025-06-11 RX ORDER — INSULIN GLARGINE 100 [IU]/ML
40 INJECTION, SOLUTION SUBCUTANEOUS DAILY
Status: DISCONTINUED | OUTPATIENT
Start: 2025-06-11 | End: 2025-06-12 | Stop reason: HOSPADM

## 2025-06-11 RX ORDER — POTASSIUM CHLORIDE 20 MEQ/1
40 TABLET, EXTENDED RELEASE ORAL DAILY
Status: DISCONTINUED | OUTPATIENT
Start: 2025-06-11 | End: 2025-06-12 | Stop reason: HOSPADM

## 2025-06-11 RX ORDER — CEFDINIR 300 MG/1
300 CAPSULE ORAL EVERY 12 HOURS
Status: DISCONTINUED | OUTPATIENT
Start: 2025-06-11 | End: 2025-06-12 | Stop reason: HOSPADM

## 2025-06-11 RX ADMIN — Medication 400 ML: at 05:06

## 2025-06-11 RX ADMIN — INSULIN GLARGINE 40 UNITS: 100 INJECTION, SOLUTION SUBCUTANEOUS at 09:06

## 2025-06-11 RX ADMIN — CEFDINIR 300 MG: 300 CAPSULE ORAL at 09:06

## 2025-06-11 RX ADMIN — POTASSIUM CHLORIDE 40 MEQ: 1500 TABLET, EXTENDED RELEASE ORAL at 09:06

## 2025-06-11 RX ADMIN — INSULIN ASPART 4 UNITS: 100 INJECTION, SOLUTION INTRAVENOUS; SUBCUTANEOUS at 04:06

## 2025-06-11 RX ADMIN — ENOXAPARIN SODIUM 40 MG: 100 INJECTION SUBCUTANEOUS at 09:06

## 2025-06-11 RX ADMIN — Medication 400 ML: at 09:06

## 2025-06-11 RX ADMIN — MICONAZOLE NITRATE: 20 POWDER TOPICAL at 09:06

## 2025-06-11 RX ADMIN — Medication 400 ML: at 12:06

## 2025-06-11 RX ADMIN — MUPIROCIN: 20 OINTMENT TOPICAL at 09:06

## 2025-06-11 RX ADMIN — SERTRALINE HYDROCHLORIDE 100 MG: 50 TABLET ORAL at 09:06

## 2025-06-11 RX ADMIN — ALLOPURINOL 100 MG: 100 TABLET ORAL at 09:06

## 2025-06-11 RX ADMIN — INSULIN ASPART 4 UNITS: 100 INJECTION, SOLUTION INTRAVENOUS; SUBCUTANEOUS at 12:06

## 2025-06-11 RX ADMIN — Medication 1 EACH: at 09:06

## 2025-06-11 RX ADMIN — ARIPIPRAZOLE 10 MG: 5 TABLET ORAL at 09:06

## 2025-06-11 RX ADMIN — Medication 400 ML: at 04:06

## 2025-06-11 RX ADMIN — INSULIN ASPART 3 UNITS: 100 INJECTION, SOLUTION INTRAVENOUS; SUBCUTANEOUS at 09:06

## 2025-06-11 RX ADMIN — Medication: at 09:06

## 2025-06-11 RX ADMIN — INSULIN ASPART 6 UNITS: 100 INJECTION, SOLUTION INTRAVENOUS; SUBCUTANEOUS at 06:06

## 2025-06-11 RX ADMIN — Medication 81 MG: at 09:06

## 2025-06-11 NOTE — PROGRESS NOTES
[facility] Hospital Medicine Progress Note        Chief Complaint: Inpatient Follow-up     HPI:   69-year-old female admitted on 06/07/2025 to the ICU secondary to acute DKA.  She has a past medical history of diabetes, hypertension and hyperlipidemia.  Also with noted chronic kidney disease.  She has urosepsis likely the source of her DKA.  Currently on Rocephin.  Urine cultures growing Klebsiella.  She was noted to have acute on chronic kidney disease.  Improving with IV fluids and treatment of her DKA.  She had an echocardiogram which showed an EF of 45-50% with a dilated RV.  Concern for possible pulmonary embolus.  V/Q scan was negative.  Possibly some afterload hypertension.  We her anion gap closed after treatment of DKA.  She had being downgraded to hospital medicine on 6/9.     Repeat blood cultures drawn on 6/9 to document clearance of bacteremia.  On 06/11 was transitioned from IV Rocephin to oral cefdinir.  Plan for expected end date of 06/18.     Interval Hx:  Sugars better but remain elevated.  2 50 this morning.  Going up on her Lantus again.  Discussed with nursing and the patient.  Afebrile.  Repeat blood cultures negative times 24 hours     Objective/physical exam:  General: In no acute distress, afebrile  Chest: Clear to auscultation bilaterally  Heart: RRR, +S1, S2, no appreciable murmur  Abdomen: Soft, nontender, BS +  MSK: Warm, no lower extremity edema, no clubbing or cyanosis  Neurologic: Alert and oriented x4, Cranial nerve II-XII intact, Strength 5/5 in all 4 extremities    VITAL SIGNS: 24 HRS MIN & MAX LAST   Temp  Min: 98.1 °F (36.7 °C)  Max: 98.8 °F (37.1 °C) 98.4 °F (36.9 °C)   BP  Min: 111/68  Max: 150/77 111/68   Pulse  Min: 84  Max: 95  88   Resp  Min: 18  Max: 18 18   SpO2  Min: 92 %  Max: 98 % 98 %       Recent Labs   Lab 06/09/25  0434 06/10/25  0329 06/11/25  0439   WBC 8.91 8.49 8.50   RBC 3.20* 3.17* 3.58*   HGB 8.4* 8.4* 9.3*   HCT 27.6* 27.2*  30.9*   MCV 86.3 85.8 86.3   MCH 26.3* 26.5* 26.0*   MCHC 30.4* 30.9* 30.1*   RDW 16.7 16.8 16.7    162 185   MPV 12.1* 12.6* 11.9*       Recent Labs   Lab 06/07/25  1758 06/07/25  2159 06/09/25  0434 06/09/25  0819 06/10/25  0329 06/11/25 0439   NA  --    < > 139 136 139 141   K  --    < > 3.3* 3.3* 3.5 3.1*   CL  --    < > 107 106 110* 112*   CO2  --    < > 19* 21* 19* 20*   BUN  --    < > 46.2* 49.4* 42.5* 30.4*   CREATININE  --    < > 1.74* 1.65* 1.41* 1.20*   GLU  --    < > 120* 215* 265* 240*   CALCIUM  --    < > 8.4 8.4 8.2* 8.5   PH 7.430  --   --   --   --   --    MG  --    < > 1.90 1.80 1.80 1.70   ALBUMIN  --    < > 1.9*  --  1.8* 1.8*   PROT  --    < > 5.5*  --  5.5* 5.4*   ALKPHOS  --    < > 65  --  69 67   ALT  --    < > 12  --  18 16   AST  --    < > 21  --  21 13   BILITOT  --    < > 0.6  --  0.4 0.3    < > = values in this interval not displayed.          Microbiology Results (last 7 days)       Procedure Component Value Units Date/Time    Blood Culture [[id number]]  (Normal) Collected: 06/09/25 1626    Order Status: Completed Specimen: Blood Updated: 06/10/25 1801     Blood Culture No Growth At 24 Hours    Blood Culture [[id number]]  (Normal) Collected: 06/09/25 1626    Order Status: Completed Specimen: Blood Updated: 06/10/25 1801     Blood Culture No Growth At 24 Hours    Blood culture x two cultures. Draw prior to antibiotics. [[id number]]  (Abnormal)  (Susceptibility) Collected: 06/07/25 1452    Order Status: Completed Specimen: Blood from Forearm, Left Updated: 06/10/25 0835     Blood Culture Klebsiella pneumoniae ssp pneumoniae     GRAM STAIN Gram Negative Rods      Seen in gram stain of broth only      1 of 2 Anaerobic bottles positive    Blood culture x two cultures. Draw prior to antibiotics. [[id number]]  (Abnormal)  (Susceptibility) Collected: 06/07/25 1446    Order Status: Completed Specimen: Blood, Venous Updated: 06/10/25 0741     Blood Culture Klebsiella pneumoniae ssp  pneumoniae     GRAM STAIN Gram Negative Rods      Seen in gram stain of broth only      2 of 2 bottles positive    Urine culture [[id number]]  (Abnormal)  (Susceptibility) Collected: 06/07/25 1717    Order Status: Completed Specimen: Urine Updated: 06/09/25 0646     Urine Culture >/= 100,000 colonies/ml Klebsiella pneumoniae ssp pneumoniae    BCID2 Panel [[id number]]  (Abnormal) Collected: 06/07/25 1446    Order Status: Completed Specimen: Blood, Venous Updated: 06/08/25 0912     CTX-M (ESBL ) Not Detected     IMP (Cabapenemase ) Not Detected     KPC resistance gene (Carbapenemase ) Not Detected     mcr-1 Not Detected     mecA ID N/A     Comment: Note: Antimicrobial resistance can occur via multiple mechanisms. A Not Detected result for antimicrobial resistance gene(s) does not indicate antimicrobial susceptibility. Subculturing is required for species identification and susceptibility testing of   isolates.        mecA/C and MREJ (MRSA) gene N/A     NDM (Carbapenemase ) Not Detected     OXA-48-like (Carbapenemase ) Not Detected     [place]/B (VRE gene) N/A     VIM (Carbapenemase ) Not Detected     Enterococcus faecalis Not Detected     Enterococcus faecium Not Detected     Listeria monocytogenes Not Detected     Staphylococcus spp. Not Detected     Staphylococcus aureus Not Detected     Staphylococcus epidermidis Not Detected     Staphylococcus lugdunensis Not Detected     Streptococcus spp. Not Detected     Streptococcus agalactiae (Group B) Not Detected     Streptococcus pneumoniae Not Detected     Streptococcus pyogenes (Group A) Not Detected     Acinetobacter calcoaceticus/baumannii complex Not Detected     Bacteroides fragilis Not Detected     Enterobacterales See Species for ID     Enterobacter cloacae complex Not Detected     Escherichia coli Not Detected     Klebsiella aerogenes Not Detected     Klebsiella oxytoca Not Detected     Klebsiella pneumoniae group  Detected     Proteus spp. Not Detected     Salmonella spp. Not Detected     Serratia marcescens Not Detected     Haemophilus influenzae Not Detected     Neisseria meningitidis Not Detected     Pseudomonas aeruginosa Not Detected     Stenotrophomonas maltophilia Not Detected     Candida albicans Not Detected     Candida auris Not Detected     Candida glabrata Not Detected     Candida krusei Not Detected     Candida parapsilosis Not Detected     Candida tropicalis Not Detected     Cryptococcus neoformans/gattii Not Detected    Narrative:      The [employer] BCID2 Panel is a multiplexed nucleic acid test intended for the use with [employer] or [employer] for the simultaneous qualitative detection and identification of multiple bacterial and yeast nucleic acids and select genetic determinants associated with antimicrobial resistance.  The [employer] BCID2 Panel test is performed directly on blood culture samples identified as positive by a continuous monitoring blood culture system.  Results are intended to be interpreted in conjunction with Gram stain results.             Radiology:  CV Ultrasound doppler venous legs bilat  Negative for deep and superficial vein thrombosis in bilateral lower   extremities.         Medications:  Scheduled Meds:   allopurinoL  100 mg Oral Daily    ARIPiprazole  10 mg Oral Daily    aspirin  81 mg Oral Daily    cefTRIAXone (Rocephin) IV (PEDS and ADULTS)  2 g Intravenous Q24H    electrolytes-dextrose  400 mL Oral Q4H    enoxparin  40 mg Subcutaneous Q12H    ferrous sulfate  1 tablet Oral Daily    insulin glargine U-100  40 Units Subcutaneous Daily    miconazole NITRATE 2 %   Topical (Top) BID    mupirocin   Nasal BID    sertraline  100 mg Oral Daily    zinc oxide-cod liver oil   Topical (Top) BID     Continuous Infusions:   D5 and 0.45% NaCl   Intravenous Continuous PRN   Stopped at 06/09/25 0114     PRN Meds:.  Current Facility-Administered Medications:      acetaminophen, 650 mg, Oral, Q6H PRN    D5 and 0.45% NaCl, , Intravenous, Continuous PRN    insulin aspart U-100, 0-10 Units, Subcutaneous, QID (AC + HS) PRN    naloxone, 0.02 mg, Intravenous, PRN    sodium chloride 0.9%, 10 mL, Intravenous, Q12H PRN    sodium chloride 0.9%, 10 mL, Intravenous, PRN    Nutrition:  Nutrition consulted. Most recent weight and BMI monitored-     Measurements:  Wt Readings from Last 1 Encounters:   06/08/25 131.5 kg (289 lb 14.5 oz)   Body mass index is 44.08 kg/m².    Patient has been screened and assessed by RD.    Malnutrition Type:  Context:    Level:      Malnutrition Characteristic Summary:       Interventions/Recommendations (treatment strategy):           Assessment/Plan:   DKA, resolved  Severe sepsis secondary to UTI, Klebsiella  Acute on CKD  Anemia, iron deficiency  Essential Hypertension  Hyperlipidemia  Depression      Replace potassium this morning   Increase Lantus to 40 units.  Continue to monitor her blood sugars closely and adjust as needed.  Also bumped her sliding scale to moderate yesterday.  DC Rocephin.  Cefdinir x7 days.  Expected end date 06/18   Continue home medications including iron and psych meds.  Renal function near baseline.  DC IV fluids.  If sugars better controlled potentially could go home tomorrow  PT/OT: Low intensity therapy    Afternoon update:  Blood sugars trending down.  Afebrile on oral cefdinir.  Plan for home tomorrow unless any acute changes overnight.       Ady Del Castillo MD   06/11/2025     All diagnosis and differential diagnosis have been reviewed; assessment and plan has been documented; I have personally reviewed the labs and test results that are presently available; I have reviewed the patients medication list; I have reviewed the consulting providers response and recommendations. I have reviewed or attempted to review medical records based upon their availability    All of the patient's questions have been  addressed and answered.  Patient's is agreeable to the above stated plan. I will continue to monitor closely and make adjustments to medical management as needed.  _____________________________________________________________________

## 2025-06-11 NOTE — PT/OT/SLP PROGRESS
Physical Therapy Treatment    Patient Name:  Vi Sultana   MRN:  [id number]    Recommendations:     Discharge therapy intensity: Low Intensity Therapy   Discharge Equipment Recommendations: walker, rolling  Barriers to discharge: Ongoing medical needs    Assessment:     Vi Sultana is a 69 y.o. female admitted with a medical diagnosis of sepsis 2/2 UTI, MYLA on CKD, metabolic acidosis, AMS, fall at home.  .  She presents with the following impairments/functional limitations: weakness, impaired endurance, impaired functional mobility, impaired self care skills, impaired skin .    Rehab Prognosis: Good; patient would benefit from acute skilled PT services to address these deficits and reach maximum level of function.    Recent Surgery: * No surgery found *      Plan:     During this hospitalization, patient would benefit from acute PT services 5 x/week to address the identified rehab impairments via gait training, therapeutic activities, therapeutic exercises, neuromuscular re-education and progress toward the following goals:    Plan of Care Expires:  07/09/25    Subjective     Chief Complaint: NONE  Patient/Family Comments/goals:   Pain/Comfort:         Objective:     Communicated with rn prior to session.  Patient found HOB elevated with telemetry, pulse ox (continuous), peripheral IV, hernandez catheter upon PT entry to room.     General Precautions: Standard, fall  Orthopedic Precautions: N/A  Braces: N/A  Respiratory Status: Room air  Skin Integrity: Visible skin intact      Functional Mobility:  Bed Mobility:     Supine to Sit: stand by assistance  Transfers:     Sit to Stand:  contact guard assistance and minimum assistance with rolling walker  Toilet Transfer: stand by assistance with  rolling walker  using  Step Transfer  Gait: Pt amb round room 2x sba with RW . Slow cadance with step through gait pattern     Therapeutic Activities/Exercises:  Performed standing standing in RW 2x for pericare sba, No lob  noted    Co-Treatment: No    Education:  Patient provided with verbal education education regarding PT role/goals/POC.  Understanding was verbalized.     Patient left up in chair with all lines intact, call button in reach, geomat cushion, ifeanyi pad in place, and RN notified    GOALS:   Multidisciplinary Problems       Physical Therapy Goals          Problem: Physical Therapy    Goal Priority Disciplines Outcome Interventions   Physical Therapy Goal     PT, PT/OT Progressing    Description: Goals to be met by: 25     Patient will increase functional independence with mobility by performin. Supine to sit with [place]  2. Sit to stand transfer with Modified [place]  3. Bed to chair transfer with Modified [place] using Rolling Walker  4. Gait 100 ft w/ RW, SBA                         Time Tracking:     PT Received On: 25  PT Start Time: 1029     PT Stop Time: 1058  PT Total Time (min): 29 min     Billable Minutes: Gait Training 10 and Therapeutic Activity 19    Treatment Type: Treatment  PT/PTA: PTA     Number of PTA visits since last PT visit: 2     2025

## 2025-06-11 NOTE — PHYSICIAN QUERY
Please clarify/provide the diagnosis associated with the clinical findings.  Metabolic encephalopathy

## 2025-06-12 VITALS
DIASTOLIC BLOOD PRESSURE: 56 MMHG | BODY MASS INDEX: 43.93 KG/M2 | SYSTOLIC BLOOD PRESSURE: 117 MMHG | WEIGHT: 289.88 LBS | TEMPERATURE: 98 F | HEIGHT: 68 IN | HEART RATE: 97 BPM | RESPIRATION RATE: 16 BRPM | OXYGEN SATURATION: 100 %

## 2025-06-12 PROBLEM — N17.9 AKI (ACUTE KIDNEY INJURY): Status: RESOLVED | Noted: 2025-06-12 | Resolved: 2025-06-12

## 2025-06-12 PROBLEM — E11.10 DIABETIC KETOACIDOSIS WITHOUT COMA ASSOCIATED WITH TYPE 2 DIABETES MELLITUS: Status: RESOLVED | Noted: 2025-06-12 | Resolved: 2025-06-12

## 2025-06-12 PROBLEM — E11.10 DIABETIC KETOACIDOSIS WITHOUT COMA ASSOCIATED WITH TYPE 2 DIABETES MELLITUS: Status: ACTIVE | Noted: 2025-06-12

## 2025-06-12 PROBLEM — N17.9 AKI (ACUTE KIDNEY INJURY): Status: ACTIVE | Noted: 2025-06-12

## 2025-06-12 PROBLEM — N39.0 URINARY TRACT INFECTION WITHOUT HEMATURIA: Status: ACTIVE | Noted: 2025-06-12

## 2025-06-12 PROBLEM — N39.0 URINARY TRACT INFECTION WITHOUT HEMATURIA: Status: RESOLVED | Noted: 2025-06-12 | Resolved: 2025-06-12

## 2025-06-12 LAB
ALBUMIN SERPL-MCNC: 1.8 G/DL (ref 3.4–4.8)
ALBUMIN/GLOB SERPL: 0.5 RATIO (ref 1.1–2)
ALP SERPL-CCNC: 65 UNIT/L (ref 40–150)
ALT SERPL-CCNC: 20 UNIT/L (ref 0–55)
ANION GAP SERPL CALC-SCNC: 5 MEQ/L
AST SERPL-CCNC: 15 UNIT/L (ref 11–45)
BASOPHILS # BLD AUTO: 0.08 X10(3)/MCL
BASOPHILS NFR BLD AUTO: 1.1 %
BILIRUB SERPL-MCNC: 0.4 MG/DL
BUN SERPL-MCNC: 21.5 MG/DL (ref 9.8–20.1)
CALCIUM SERPL-MCNC: 8.9 MG/DL (ref 8.4–10.2)
CHLORIDE SERPL-SCNC: 112 MMOL/L (ref 98–107)
CO2 SERPL-SCNC: 24 MMOL/L (ref 23–31)
CREAT SERPL-MCNC: 0.86 MG/DL (ref 0.55–1.02)
CREAT/UREA NIT SERPL: 25
EOSINOPHIL # BLD AUTO: 0.25 X10(3)/MCL (ref 0–0.9)
EOSINOPHIL NFR BLD AUTO: 3.5 %
ERYTHROCYTE [DISTWIDTH] IN BLOOD BY AUTOMATED COUNT: 16.7 % (ref 11.5–17)
GFR SERPLBLD CREATININE-BSD FMLA CKD-EPI: >60 ML/MIN/1.73/M2
GLOBULIN SER-MCNC: 3.7 GM/DL (ref 2.4–3.5)
GLUCOSE SERPL-MCNC: 105 MG/DL (ref 82–115)
HCT VFR BLD AUTO: 30.1 % (ref 37–47)
HGB BLD-MCNC: 9.1 G/DL (ref 12–16)
IMM GRANULOCYTES # BLD AUTO: 0.07 X10(3)/MCL (ref 0–0.04)
IMM GRANULOCYTES NFR BLD AUTO: 1 %
LYMPHOCYTES # BLD AUTO: 1.37 X10(3)/MCL (ref 0.6–4.6)
LYMPHOCYTES NFR BLD AUTO: 19.3 %
MAGNESIUM SERPL-MCNC: 1.8 MG/DL (ref 1.6–2.6)
MCH RBC QN AUTO: 26.1 PG (ref 27–31)
MCHC RBC AUTO-ENTMCNC: 30.2 G/DL (ref 33–36)
MCV RBC AUTO: 86.2 FL (ref 80–94)
MONOCYTES # BLD AUTO: 0.52 X10(3)/MCL (ref 0.1–1.3)
MONOCYTES NFR BLD AUTO: 7.3 %
NEUTROPHILS # BLD AUTO: 4.82 X10(3)/MCL (ref 2.1–9.2)
NEUTROPHILS NFR BLD AUTO: 67.8 %
NRBC BLD AUTO-RTO: 0 %
PHOSPHATE SERPL-MCNC: 2.9 MG/DL (ref 2.3–4.7)
PLATELET # BLD AUTO: 175 X10(3)/MCL (ref 130–400)
PMV BLD AUTO: 11.4 FL (ref 7.4–10.4)
POCT GLUCOSE: 105 MG/DL (ref 70–110)
POCT GLUCOSE: 198 MG/DL (ref 70–110)
POCT GLUCOSE: 208 MG/DL (ref 70–110)
POTASSIUM SERPL-SCNC: 3.5 MMOL/L (ref 3.5–5.1)
PROT SERPL-MCNC: 5.5 GM/DL (ref 5.8–7.6)
RBC # BLD AUTO: 3.49 X10(6)/MCL (ref 4.2–5.4)
SODIUM SERPL-SCNC: 141 MMOL/L (ref 136–145)
WBC # BLD AUTO: 7.11 X10(3)/MCL (ref 4.5–11.5)

## 2025-06-12 PROCEDURE — 85025 COMPLETE CBC W/AUTO DIFF WBC: CPT

## 2025-06-12 PROCEDURE — 97530 THERAPEUTIC ACTIVITIES: CPT

## 2025-06-12 PROCEDURE — 36415 COLL VENOUS BLD VENIPUNCTURE: CPT

## 2025-06-12 PROCEDURE — 25000003 PHARM REV CODE 250: Performed by: INTERNAL MEDICINE

## 2025-06-12 PROCEDURE — 25000003 PHARM REV CODE 250: Performed by: HOSPITALIST

## 2025-06-12 PROCEDURE — 84100 ASSAY OF PHOSPHORUS: CPT

## 2025-06-12 PROCEDURE — 83735 ASSAY OF MAGNESIUM: CPT

## 2025-06-12 PROCEDURE — 63600175 PHARM REV CODE 636 W HCPCS: Performed by: HOSPITALIST

## 2025-06-12 PROCEDURE — 63600175 PHARM REV CODE 636 W HCPCS

## 2025-06-12 PROCEDURE — 80053 COMPREHEN METABOLIC PANEL: CPT

## 2025-06-12 RX ORDER — FUROSEMIDE 20 MG/1
20 TABLET ORAL DAILY
Qty: 30 TABLET | Refills: 11 | Status: SHIPPED | OUTPATIENT
Start: 2025-06-12 | End: 2026-06-12

## 2025-06-12 RX ORDER — INSULIN GLARGINE 100 [IU]/ML
40 INJECTION, SOLUTION SUBCUTANEOUS DAILY
Qty: 12 ML | Refills: 11 | Status: SHIPPED | OUTPATIENT
Start: 2025-06-12 | End: 2026-06-12

## 2025-06-12 RX ORDER — FUROSEMIDE 20 MG/1
20 TABLET ORAL DAILY
Status: DISCONTINUED | OUTPATIENT
Start: 2025-06-12 | End: 2025-06-12 | Stop reason: HOSPADM

## 2025-06-12 RX ORDER — LISINOPRIL 2.5 MG/1
2.5 TABLET ORAL DAILY
Qty: 90 TABLET | Refills: 3 | Status: SHIPPED | OUTPATIENT
Start: 2025-06-12 | End: 2026-06-12

## 2025-06-12 RX ORDER — CEFDINIR 300 MG/1
300 CAPSULE ORAL EVERY 12 HOURS
Qty: 14 CAPSULE | Refills: 0 | Status: SHIPPED | OUTPATIENT
Start: 2025-06-12 | End: 2025-06-19

## 2025-06-12 RX ORDER — LANCETS
EACH MISCELLANEOUS
Qty: 100 EACH | Refills: 0 | Status: SHIPPED | OUTPATIENT
Start: 2025-06-12

## 2025-06-12 RX ORDER — METOPROLOL TARTRATE 25 MG/1
12.5 TABLET, FILM COATED ORAL 2 TIMES DAILY
Qty: 90 TABLET | Refills: 3 | Status: SHIPPED | OUTPATIENT
Start: 2025-06-12 | End: 2026-06-12

## 2025-06-12 RX ORDER — INSULIN PUMP SYRINGE, 3 ML
EACH MISCELLANEOUS
Qty: 1 EACH | Refills: 0 | Status: SHIPPED | OUTPATIENT
Start: 2025-06-12

## 2025-06-12 RX ADMIN — INSULIN ASPART 4 UNITS: 100 INJECTION, SOLUTION INTRAVENOUS; SUBCUTANEOUS at 11:06

## 2025-06-12 RX ADMIN — Medication: at 10:06

## 2025-06-12 RX ADMIN — FUROSEMIDE 20 MG: 20 TABLET ORAL at 10:06

## 2025-06-12 RX ADMIN — ALLOPURINOL 100 MG: 100 TABLET ORAL at 10:06

## 2025-06-12 RX ADMIN — Medication 81 MG: at 10:06

## 2025-06-12 RX ADMIN — INSULIN GLARGINE 40 UNITS: 100 INJECTION, SOLUTION SUBCUTANEOUS at 10:06

## 2025-06-12 RX ADMIN — SERTRALINE HYDROCHLORIDE 100 MG: 50 TABLET ORAL at 10:06

## 2025-06-12 RX ADMIN — Medication 400 ML: at 10:06

## 2025-06-12 RX ADMIN — ENOXAPARIN SODIUM 40 MG: 100 INJECTION SUBCUTANEOUS at 10:06

## 2025-06-12 RX ADMIN — Medication 400 ML: at 04:06

## 2025-06-12 RX ADMIN — Medication 1 EACH: at 10:06

## 2025-06-12 RX ADMIN — CEFDINIR 300 MG: 300 CAPSULE ORAL at 10:06

## 2025-06-12 RX ADMIN — MICONAZOLE NITRATE: 20 POWDER TOPICAL at 10:06

## 2025-06-12 RX ADMIN — POTASSIUM CHLORIDE 40 MEQ: 1500 TABLET, EXTENDED RELEASE ORAL at 10:06

## 2025-06-12 RX ADMIN — MUPIROCIN: 20 OINTMENT TOPICAL at 10:06

## 2025-06-12 RX ADMIN — ARIPIPRAZOLE 10 MG: 5 TABLET ORAL at 10:06

## 2025-06-12 NOTE — PT/OT/SLP PROGRESS
Physical Therapy Treatment    Patient Name:  Vi Sultana   MRN:  [id number]    Recommendations:     Discharge therapy intensity: Low Intensity Therapy   Discharge Equipment Recommendations: walker, rolling  Barriers to discharge: None    Assessment:     Vi Sultana is a 69 y.o. female admitted with a medical diagnosis of UTI, MYLA, metabolic acidosis, AMS, fall.  She presents with the following impairments/functional limitations: weakness, impaired endurance, impaired functional mobility, impaired self care skills, impaired skin.     Performed transfer, gait, and stair training today in anticipation of discharge home. Educated pt on rec for RW and HH. Discussed fall prevention and pacing techniques.     Rehab Prognosis: Good; patient would benefit from acute skilled PT services to address these deficits and reach maximum level of function.    Recent Surgery: * No surgery found *      Plan:     During this hospitalization, patient would benefit from acute PT services 5 x/week to address the identified rehab impairments via gait training, therapeutic activities, therapeutic exercises, neuromuscular re-education and progress toward the following goals:    Plan of Care Expires:  07/09/25    Subjective     Chief Complaint: needing to have BM. Assisted pt to bathroom.   Patient/Family Comments/goals: to go home.   Pain/Comfort:  Pain Rating 1: 0/10      Objective:     Communicated with nurse prior to session.  Patient found supine with telemetry, pulse ox (continuous), peripheral IV, hernandez catheter upon PT entry to room.     General Precautions: Standard, fall  Orthopedic Precautions: N/A  Braces: N/A  Respiratory Status: Room air  Skin Integrity: Visible skin intact  HR 120s, SpO2 97% after stair training.     Functional Mobility:  Bed Mobility:     Scooting: stand by assistance  Supine to Sit: minimum assistance  Transfers:     Sit to Stand:  contact guard assistance with rolling walker  Toilet Transfer: moderate  assistance with  rolling walker  using  from low toilet.   Gait: 40ft with RW and CGA. Slow pace.   Stairs:  Pt ascended/descended 2 stair(s) with No Assistive Device with bilateral handrails with Contact Guard Assistance.     Co-Treatment: No    Education:  Patient provided with verbal education education regarding PT role/goals/POC, fall prevention, and safety awareness.  Understanding was verbalized.     Patient left up in chair with all lines intact, call button in reach, and CM and RN notified    GOALS:   Multidisciplinary Problems       Physical Therapy Goals          Problem: Physical Therapy    Goal Priority Disciplines Outcome Interventions   Physical Therapy Goal     PT, PT/OT Progressing    Description: Goals to be met by: 25     Patient will increase functional independence with mobility by performin. Supine to sit with [place]  2. Sit to stand transfer with Modified [place]  3. Bed to chair transfer with Modified [place] using Rolling Walker  4. Gait 100 ft w/ RW, SBA                         Time Tracking:     PT Received On: 25  PT Start Time: 0934     PT Stop Time: 1002  PT Total Time (min): 28 min     Billable Minutes: Therapeutic Activity 28    Treatment Type: Treatment  PT/PTA: PT     Number of PTA visits since last PT visit: 3     2025

## 2025-06-12 NOTE — DISCHARGE INSTRUCTIONS
1.-please follow with your primary care physician in 1-2 weeks   2.-you has been referred to Cardiology/[facility]/Dr. Russell for new onset systolic/diastolic heart failure  3.-Lantus/metoprolol/lisinopril/cefdinir/diabetic supplies sent to your pharmacy of choice  4. -rechecked blood sugars at home, keep log for follow visit with your primary care physician

## 2025-06-12 NOTE — PT/OT/SLP PROGRESS
Attempted OT session this AM. Pt sitting in chair and states she already mobilized with PT. Also awaiting d/c home this afternoon. Declined to participate at this time and reported no needs or concerns. Will f/u pending d/c.

## 2025-06-12 NOTE — PLAN OF CARE
06/12/25 0940   Final Note   Assessment Type Final Discharge Note   Anticipated Discharge Disposition Home-Health   Hospital Resources/Appts/Education Provided Post-Acute resouces added to AVS   Post-Acute Status   Post-Acute Authorization Home Health   Home Health Status Set-up Complete/Auth obtained  (Nursing Specialties)   Discharge Delays None known at this time     Patient does not have qualifying diagnosis for walker, patient reports she will be able to get one at discharge. Cardiology consult placed.

## 2025-06-13 ENCOUNTER — PATIENT MESSAGE (OUTPATIENT)
Dept: ADMINISTRATIVE | Facility: CLINIC | Age: 70
End: 2025-06-13
Payer: MEDICARE

## 2025-06-13 ENCOUNTER — PATIENT OUTREACH (OUTPATIENT)
Dept: ADMINISTRATIVE | Facility: CLINIC | Age: 70
End: 2025-06-13
Payer: MEDICARE

## 2025-06-13 NOTE — PROGRESS NOTES
C3 nurse attempted to contact [address] for a TCC post hospital discharge follow up call. Voicemail not available. The patient has a scheduled HOSFU appointment with Teresa Santana MD on 6/16 @ 4164. Patient also as a f/u appt with Shayan Russell MD (Cardiology) on 7/16 @ 6017.

## 2025-06-14 LAB
BACTERIA BLD CULT: NORMAL
BACTERIA BLD CULT: NORMAL

## 2025-06-24 ENCOUNTER — LAB VISIT (OUTPATIENT)
Dept: LAB | Facility: HOSPITAL | Age: 70
End: 2025-06-24
Attending: FAMILY MEDICINE
Payer: MEDICARE

## 2025-06-24 DIAGNOSIS — E11.10 DIABETIC ACIDOSIS WITHOUT COMA: Primary | ICD-10-CM

## 2025-06-24 LAB
ALBUMIN SERPL-MCNC: 3 G/DL (ref 3.4–4.8)
ALBUMIN/GLOB SERPL: 0.7 RATIO (ref 1.1–2)
ALP SERPL-CCNC: 69 UNIT/L (ref 40–150)
ALT SERPL-CCNC: 15 UNIT/L (ref 0–55)
ANION GAP SERPL CALC-SCNC: 7 MEQ/L
AST SERPL-CCNC: 17 UNIT/L (ref 11–45)
BASOPHILS # BLD AUTO: 0.11 X10(3)/MCL
BASOPHILS NFR BLD AUTO: 1.3 %
BILIRUB SERPL-MCNC: 0.5 MG/DL
BUN SERPL-MCNC: 17 MG/DL (ref 9.8–20.1)
CALCIUM SERPL-MCNC: 10.1 MG/DL (ref 8.4–10.2)
CHLORIDE SERPL-SCNC: 101 MMOL/L (ref 98–107)
CO2 SERPL-SCNC: 32 MMOL/L (ref 23–31)
CREAT SERPL-MCNC: 1.16 MG/DL (ref 0.55–1.02)
CREAT/UREA NIT SERPL: 15
EOSINOPHIL # BLD AUTO: 0.16 X10(3)/MCL (ref 0–0.9)
EOSINOPHIL NFR BLD AUTO: 2 %
ERYTHROCYTE [DISTWIDTH] IN BLOOD BY AUTOMATED COUNT: 16.9 % (ref 11.5–17)
GFR SERPLBLD CREATININE-BSD FMLA CKD-EPI: 51 ML/MIN/1.73/M2
GLOBULIN SER-MCNC: 4.2 GM/DL (ref 2.4–3.5)
GLUCOSE SERPL-MCNC: 117 MG/DL (ref 82–115)
HCT VFR BLD AUTO: 34.7 % (ref 37–47)
HGB BLD-MCNC: 10.4 G/DL (ref 12–16)
IMM GRANULOCYTES # BLD AUTO: 0.04 X10(3)/MCL (ref 0–0.04)
IMM GRANULOCYTES NFR BLD AUTO: 0.5 %
LYMPHOCYTES # BLD AUTO: 1.55 X10(3)/MCL (ref 0.6–4.6)
LYMPHOCYTES NFR BLD AUTO: 19 %
MCH RBC QN AUTO: 26.1 PG (ref 27–31)
MCHC RBC AUTO-ENTMCNC: 30 G/DL (ref 33–36)
MCV RBC AUTO: 87 FL (ref 80–94)
MONOCYTES # BLD AUTO: 0.34 X10(3)/MCL (ref 0.1–1.3)
MONOCYTES NFR BLD AUTO: 4.2 %
NEUTROPHILS # BLD AUTO: 5.96 X10(3)/MCL (ref 2.1–9.2)
NEUTROPHILS NFR BLD AUTO: 73 %
NRBC BLD AUTO-RTO: 0 %
PLATELET # BLD AUTO: 268 X10(3)/MCL (ref 130–400)
PMV BLD AUTO: 11.5 FL (ref 7.4–10.4)
POTASSIUM SERPL-SCNC: 3.7 MMOL/L (ref 3.5–5.1)
PROT SERPL-MCNC: 7.2 GM/DL (ref 5.8–7.6)
RBC # BLD AUTO: 3.99 X10(6)/MCL (ref 4.2–5.4)
SODIUM SERPL-SCNC: 140 MMOL/L (ref 136–145)
WBC # BLD AUTO: 8.16 X10(3)/MCL (ref 4.5–11.5)

## 2025-06-24 PROCEDURE — 80053 COMPREHEN METABOLIC PANEL: CPT

## 2025-06-24 PROCEDURE — 36415 COLL VENOUS BLD VENIPUNCTURE: CPT

## 2025-06-24 PROCEDURE — 85025 COMPLETE CBC W/AUTO DIFF WBC: CPT

## 2025-06-25 NOTE — DISCHARGE SUMMARY
[facility] Hospital Medicine Discharge Summary    Admit Date: 6/7/2025  Discharge Date and Time: 6/12/25  Admitting Physician:  Team  Discharging Physician: Shane Verma MD.  Primary Care Physician: Teresa Santana MD  Consults: Pulmonary/Intensive care    Discharge Diagnoses:  DKA, resolved  Severe sepsis secondary to UTI, Klebsiella pna  Bacteremia secondary to klebsiella pna   Acute renal failure   Anemia, iron deficiency  Essential Hypertension  Hyperlipidemia  Depression   New onset systolic / diastolic heart failure - compensated on admission   Metabolic encephalopathy   Fall at home     Hospital Course:   69-year-old female admitted on 06/07/2025 to the ICU secondary to acute DKA.  She has a past medical history of diabetes, hypertension and hyperlipidemia.  Also with noted chronic kidney disease.  She has urosepsis likely the source of her DKA.  Currently on Rocephin.  Urine cultures growing Klebsiella.  She was noted to have acute renal failure.  Improving with IV fluids and treatment of her DKA.  She had an echocardiogram which showed an EF of 45-50% with a dilated RV.  Concern for possible pulmonary embolus.  V/Q scan was negative.  Possibly some afterload hypertension.  We her anion gap closed after treatment of DKA.  She had being downgraded to hospital medicine on 6/9.      Repeat blood cultures drawn on 6/9 to document clearance of bacteremia negative .  On 06/11 was transitioned from IV Rocephin to oral cefdinir.  Plan for expected end date of 06/18.        Lantus titrated to normoglycemia again. Pt will follow up with her julee CHAN and referred to cardiology for systolic dysfunction .   Low intensity by PT-  consulted for   Pt was seen and examined on the day of discharge  Vitals:  VITAL SIGNS: 24 HRS MIN & MAX LAST   No data recorded 98 °F (36.7 °C)   No data recorded (!) 117/56   No data recorded  97   No data recorded 16   No data recorded 100 %        Physical Exam:  General: In no acute distress, afebrile  Chest: Clear to auscultation bilaterally  Heart: RRR, +S1, S2, no appreciable murmur  Abdomen: Soft, nontender, BS +  MSK: Warm, no lower extremity edema, no clubbing or cyanosis  Neurologic: Alert and oriented x4, Cranial nerve II-XII intact, Strength 5/5 in all 4 extremities        Recent Labs   Lab 06/24/25  0959   WBC 8.16   RBC 3.99*   HGB 10.4*   HCT 34.7*   MCV 87.0   MCH 26.1*   MCHC 30.0*   RDW 16.9      MPV 11.5*       Recent Labs   Lab 06/24/25  0959      K 3.7      CO2 32*   BUN 17.0   CREATININE 1.16*   *   CALCIUM 10.1   ALBUMIN 3.0*   PROT 7.2   ALKPHOS 69   ALT 15   AST 17   BILITOT 0.5        Microbiology Results (last 7 days)       Procedure Component Value Units Date/Time    Blood culture x two cultures. Draw prior to antibiotics. [[id number]]  (Abnormal)  (Susceptibility) Collected: 06/07/25 1452    Order Status: Completed Specimen: Blood from Forearm, Left Updated: 06/10/25 0835     Blood Culture Klebsiella pneumoniae ssp pneumoniae     GRAM STAIN Gram Negative Rods      Seen in gram stain of broth only      1 of 2 Anaerobic bottles positive    Blood culture x two cultures. Draw prior to antibiotics. [[id number]]  (Abnormal)  (Susceptibility) Collected: 06/07/25 1446    Order Status: Completed Specimen: Blood, Venous Updated: 06/10/25 0741     Blood Culture Klebsiella pneumoniae ssp pneumoniae     GRAM STAIN Gram Negative Rods      Seen in gram stain of broth only      2 of 2 bottles positive    Urine culture [[id number]]  (Abnormal)  (Susceptibility) Collected: 06/07/25 1717    Order Status: Completed Specimen: Urine Updated: 06/09/25 0646     Urine Culture >/= 100,000 colonies/ml Klebsiella pneumoniae ssp pneumoniae    BCID2 Panel [[id number]]  (Abnormal) Collected: 06/07/25 1446    Order Status: Completed Specimen: Blood, Venous Updated: 06/08/25 0912     CTX-M (ESBL ) Not Detected     IMP  (Cabapenemase ) Not Detected     KPC resistance gene (Carbapenemase ) Not Detected     mcr-1 Not Detected     mecA ID N/A     Comment: Note: Antimicrobial resistance can occur via multiple mechanisms. A Not Detected result for antimicrobial resistance gene(s) does not indicate antimicrobial susceptibility. Subculturing is required for species identification and susceptibility testing of   isolates.        mecA/C and MREJ (MRSA) gene N/A     NDM (Carbapenemase ) Not Detected     OXA-48-like (Carbapenemase ) Not Detected     [place]/B (VRE gene) N/A     VIM (Carbapenemase ) Not Detected     Enterococcus faecalis Not Detected     Enterococcus faecium Not Detected     Listeria monocytogenes Not Detected     Staphylococcus spp. Not Detected     Staphylococcus aureus Not Detected     Staphylococcus epidermidis Not Detected     Staphylococcus lugdunensis Not Detected     Streptococcus spp. Not Detected     Streptococcus agalactiae (Group B) Not Detected     Streptococcus pneumoniae Not Detected     Streptococcus pyogenes (Group A) Not Detected     Acinetobacter calcoaceticus/baumannii complex Not Detected     Bacteroides fragilis Not Detected     Enterobacterales See Species for ID     Enterobacter cloacae complex Not Detected     Escherichia coli Not Detected     Klebsiella aerogenes Not Detected     Klebsiella oxytoca Not Detected     Klebsiella pneumoniae group Detected     Proteus spp. Not Detected     Salmonella spp. Not Detected     Serratia marcescens Not Detected     Haemophilus influenzae Not Detected     Neisseria meningitidis Not Detected     Pseudomonas aeruginosa Not Detected     Stenotrophomonas maltophilia Not Detected     Candida albicans Not Detected     Candida auris Not Detected     Candida glabrata Not Detected     Candida krusei Not Detected     Candida parapsilosis Not Detected     Candida tropicalis Not Detected     Cryptococcus neoformans/gattii Not Detected     Narrative:      The [employer] - Smartphone RFID BCID2 Panel is a multiplexed nucleic acid test intended for the use with BioFire® FilmArray® 2.0 or [employer] - Smartphone RFID® FilmArray® [employer] for the simultaneous qualitative detection and identification of multiple bacterial and yeast nucleic acids and select genetic determinants associated with antimicrobial resistance.  The BioFire BCID2 Panel test is performed directly on blood culture samples identified as positive by a continuous monitoring blood culture system.  Results are intended to be interpreted in conjunction with Gram stain results.             CV Ultrasound doppler venous legs bilat  Negative for deep and superficial vein thrombosis in bilateral lower   extremities.          Medication List        PAUSE taking these medications      semaglutide 0.25 mg or 0.5 mg (2 mg/3 mL) pen injector  Wait to take this until your doctor or other care provider tells you to start again.  Commonly known as: OZEMPIC            START taking these medications      blood sugar diagnostic Strp  To check BG 3 times daily, to use with insurance preferred meter     blood-glucose meter kit  To check BG 3 times daily, to use with insurance preferred meter     insulin glargine U-100 (Lantus) 100 unit/mL injection  Inject 40 Units into the skin once daily.     lancets Misc  To check BG 3 times daily, to use with insurance preferred meter     lisinopriL 2.5 MG tablet  Commonly known as: PRINIVIL,ZESTRIL  Take 1 tablet (2.5 mg total) by mouth once daily.     metoprolol tartrate 25 MG tablet  Commonly known as: LOPRESSOR  Take 0.5 tablets (12.5 mg total) by mouth 2 (two) times daily.            CHANGE how you take these medications      furosemide 20 MG tablet  Commonly known as: LASIX  Take 1 tablet (20 mg total) by mouth once daily.  What changed:   medication strength  how much to take  when to take this  additional instructions            CONTINUE taking these medications      allopurinoL 100 MG  tablet  Commonly known as: ZYLOPRIM     ARIPiprazole 10 MG Tab  Commonly known as: ABILIFY     aspirin 81 MG EC tablet  Commonly known as: ECOTRIN     CALTRATE 600 + D ORAL     ferrous gluconate 324 MG tablet  Commonly known as: FERGON     fish oil-omega-3 fatty acids 300-1,000 mg capsule     rosuvastatin 40 MG Tab  Commonly known as: CRESTOR     sertraline 100 MG tablet  Commonly known as: ZOLOFT            ASK your doctor about these medications      cefdinir 300 MG capsule  Commonly known as: OMNICEF  Take 1 capsule (300 mg total) by mouth every 12 (twelve) hours. for 7 days  Ask about: Should I take this medication?               Where to Get Your Medications        These medications were sent to [id number] [place] [place]  [address]  [address] [address]      Phone: [phone]   blood sugar diagnostic Strp  blood-glucose meter kit  cefdinir 300 MG capsule  furosemide 20 MG tablet  insulin glargine U-100 (Lantus) 100 unit/mL injection  lancets Misc  lisinopriL 2.5 MG tablet  metoprolol tartrate 25 MG tablet          Explained in detail to the patient about the discharge plan, medications, and follow-up visits. Pt understands and agrees with the treatment plan  Discharge Disposition: home w home health  Discharged Condition: stable  Diet-  ADA/ casrdiac   Medications Per DC med rec  Activities as tolerated   Contact information for follow-up providers       Teresa Santana MD. Go on 6/16/2025.    Specialty: Family Medicine  Why: Monday June 16 @9:45  Contact information:  [address]  [address]  [place] [address]  [phone]               May, Shayan B, MD. Go on 7/16/2025.    Specialty: Cardiology  Why: July 16 @ 10  Contact information:  [address]  [address]  [place] [address]  [phone]                       Contact information for after-discharge care       [facility]       [facility] .    Service: Home Health Services  Contact  information:  [address] [facility] [address]  [phone]                                 For further questions contact hospitalist office    Discharge time 33 minutes    For worsening symptoms, chest pain, shortness of breath, increased abdominal pain, high grade fever, stroke or stroke like symptoms, immediately go to the nearest Emergency Room or call 911 as soon as possible.      Shane Daley M.D

## 2025-07-28 ENCOUNTER — HOSPITAL ENCOUNTER (OUTPATIENT)
Dept: RADIOLOGY | Facility: HOSPITAL | Age: 70
Discharge: HOME | End: 2025-07-28
Attending: FAMILY MEDICINE
Payer: MEDICARE

## 2025-07-28 DIAGNOSIS — Z12.31 OTHER SCREENING MAMMOGRAM: ICD-10-CM

## 2025-07-28 PROCEDURE — 77067 SCR MAMMO BI INCL CAD: CPT | Mod: 26,,, | Performed by: STUDENT IN AN ORGANIZED HEALTH CARE EDUCATION/TRAINING PROGRAM

## 2025-07-28 PROCEDURE — 77063 BREAST TOMOSYNTHESIS BI: CPT | Mod: 26,,, | Performed by: STUDENT IN AN ORGANIZED HEALTH CARE EDUCATION/TRAINING PROGRAM

## 2025-07-28 PROCEDURE — 77063 BREAST TOMOSYNTHESIS BI: CPT | Mod: TC

## 2025-08-18 ENCOUNTER — LAB VISIT (OUTPATIENT)
Dept: LAB | Facility: HOSPITAL | Age: 70
End: 2025-08-18
Attending: FAMILY MEDICINE
Payer: MEDICARE

## 2025-08-18 DIAGNOSIS — E11.65 INADEQUATELY CONTROLLED DIABETES MELLITUS: ICD-10-CM

## 2025-08-18 DIAGNOSIS — E78.2 MIXED HYPERLIPIDEMIA: ICD-10-CM

## 2025-08-18 DIAGNOSIS — I11.9 MALIGNANT HYPERTENSIVE HEART DISEASE WITHOUT HEART FAILURE: Primary | ICD-10-CM

## 2025-08-18 LAB
ALBUMIN SERPL-MCNC: 3.3 G/DL (ref 3.4–4.8)
ALBUMIN/GLOB SERPL: 0.8 RATIO (ref 1.1–2)
ALP SERPL-CCNC: 70 UNIT/L (ref 40–150)
ALT SERPL-CCNC: 15 UNIT/L (ref 0–55)
ANION GAP SERPL CALC-SCNC: 9 MEQ/L
AST SERPL-CCNC: 14 UNIT/L (ref 11–45)
BASOPHILS # BLD AUTO: 0.09 X10(3)/MCL
BASOPHILS NFR BLD AUTO: 0.8 %
BILIRUB SERPL-MCNC: 0.6 MG/DL
BUN SERPL-MCNC: 41 MG/DL (ref 9.8–20.1)
CALCIUM SERPL-MCNC: 10.5 MG/DL (ref 8.4–10.2)
CHLORIDE SERPL-SCNC: 105 MMOL/L (ref 98–107)
CHOLEST SERPL-MCNC: 142 MG/DL
CHOLEST/HDLC SERPL: 4 {RATIO} (ref 0–5)
CO2 SERPL-SCNC: 29 MMOL/L (ref 23–31)
CREAT SERPL-MCNC: 1.14 MG/DL (ref 0.55–1.02)
CREAT/UREA NIT SERPL: 36
EOSINOPHIL # BLD AUTO: 0.21 X10(3)/MCL (ref 0–0.9)
EOSINOPHIL NFR BLD AUTO: 1.8 %
ERYTHROCYTE [DISTWIDTH] IN BLOOD BY AUTOMATED COUNT: 16.6 % (ref 11.5–17)
EST. AVERAGE GLUCOSE BLD GHB EST-MCNC: 165.7 MG/DL
GFR SERPLBLD CREATININE-BSD FMLA CKD-EPI: 52 ML/MIN/1.73/M2
GLOBULIN SER-MCNC: 4.2 GM/DL (ref 2.4–3.5)
GLUCOSE SERPL-MCNC: 162 MG/DL (ref 82–115)
HBA1C MFR BLD: 7.4 %
HCT VFR BLD AUTO: 35.2 % (ref 37–47)
HDLC SERPL-MCNC: 36 MG/DL (ref 35–60)
HGB BLD-MCNC: 10.7 G/DL (ref 12–16)
IMM GRANULOCYTES # BLD AUTO: 0.04 X10(3)/MCL (ref 0–0.04)
IMM GRANULOCYTES NFR BLD AUTO: 0.3 %
LDLC SERPL CALC-MCNC: 71 MG/DL (ref 50–140)
LYMPHOCYTES # BLD AUTO: 2.03 X10(3)/MCL (ref 0.6–4.6)
LYMPHOCYTES NFR BLD AUTO: 17.5 %
MCH RBC QN AUTO: 26 PG (ref 27–31)
MCHC RBC AUTO-ENTMCNC: 30.4 G/DL (ref 33–36)
MCV RBC AUTO: 85.6 FL (ref 80–94)
MONOCYTES # BLD AUTO: 0.58 X10(3)/MCL (ref 0.1–1.3)
MONOCYTES NFR BLD AUTO: 5 %
NEUTROPHILS # BLD AUTO: 8.62 X10(3)/MCL (ref 2.1–9.2)
NEUTROPHILS NFR BLD AUTO: 74.6 %
NRBC BLD AUTO-RTO: 0 %
PLATELET # BLD AUTO: 202 X10(3)/MCL (ref 130–400)
PMV BLD AUTO: 11.7 FL (ref 7.4–10.4)
POTASSIUM SERPL-SCNC: 4.1 MMOL/L (ref 3.5–5.1)
PROT SERPL-MCNC: 7.5 GM/DL (ref 5.8–7.6)
RBC # BLD AUTO: 4.11 X10(6)/MCL (ref 4.2–5.4)
SODIUM SERPL-SCNC: 143 MMOL/L (ref 136–145)
TRIGL SERPL-MCNC: 175 MG/DL (ref 37–140)
VLDLC SERPL CALC-MCNC: 35 MG/DL
WBC # BLD AUTO: 11.57 X10(3)/MCL (ref 4.5–11.5)

## 2025-08-18 PROCEDURE — 80061 LIPID PANEL: CPT

## 2025-08-18 PROCEDURE — 85025 COMPLETE CBC W/AUTO DIFF WBC: CPT

## 2025-08-18 PROCEDURE — 83036 HEMOGLOBIN GLYCOSYLATED A1C: CPT

## 2025-08-18 PROCEDURE — 36415 COLL VENOUS BLD VENIPUNCTURE: CPT

## 2025-08-18 PROCEDURE — 80053 COMPREHEN METABOLIC PANEL: CPT

## (undated) DEVICE — SNARE CAPTIFLEX 2.4X27MM 240CM

## (undated) DEVICE — FORCEP BX LG CAP 2.8MMX240CM

## (undated) DEVICE — ADAPTER DUAL NSL LUER M-M 7FT

## (undated) DEVICE — CONTAINER SPEC STRL PATH 4OZ

## (undated) DEVICE — UNDERPAD DISPOSABLE 30X30IN

## (undated) DEVICE — BLOCK BLOX BITE DENT RIM 54FR

## (undated) DEVICE — PAD GROUNDING DISPOABLE

## (undated) DEVICE — TRAP SUCTION POLYP

## (undated) DEVICE — BAG LABGUARD BIOHAZARD 6X9IN

## (undated) DEVICE — KIT CANIST SUCTION 1200CC

## (undated) DEVICE — SOL IRRI STRL WATER 1000ML

## (undated) DEVICE — KIT SURGICAL COLON .25 1.1OZ

## (undated) DEVICE — Device

## (undated) DEVICE — TIP SUCTION YANKAUER

## (undated) DEVICE — COLLECTION SPECIMEN NEPTUNE

## (undated) DEVICE — ELECTRODE REM PLYHSV RETURN 9

## (undated) DEVICE — DEVICE RESOLUTION 360 CLIP

## (undated) DEVICE — SNARE CAPTIFLEX MOF 27X2.4X28